# Patient Record
Sex: FEMALE | Race: WHITE | NOT HISPANIC OR LATINO | Employment: PART TIME | URBAN - METROPOLITAN AREA
[De-identification: names, ages, dates, MRNs, and addresses within clinical notes are randomized per-mention and may not be internally consistent; named-entity substitution may affect disease eponyms.]

---

## 2017-05-12 ENCOUNTER — APPOINTMENT (OUTPATIENT)
Dept: GENERAL RADIOLOGY | Facility: HOSPITAL | Age: 42
End: 2017-05-12

## 2017-05-12 ENCOUNTER — HOSPITAL ENCOUNTER (INPATIENT)
Facility: HOSPITAL | Age: 42
LOS: 2 days | Discharge: HOME | End: 2017-05-14
Attending: EMERGENCY MEDICINE | Admitting: ORTHOPAEDIC SURGERY

## 2017-05-12 ENCOUNTER — APPOINTMENT (OUTPATIENT)
Dept: CT IMAGING | Facility: HOSPITAL | Age: 42
End: 2017-05-12

## 2017-05-12 ENCOUNTER — PREP FOR SURGERY (OUTPATIENT)
Dept: ORTHOPEDIC SURGERY | Facility: CLINIC | Age: 42
End: 2017-05-12

## 2017-05-12 DIAGNOSIS — L82.0 INFLAMED SEBORRHEIC KERATOSIS: ICD-10-CM

## 2017-05-12 DIAGNOSIS — S82.841A BIMALLEOLAR ANKLE FRACTURE, RIGHT, CLOSED, INITIAL ENCOUNTER: ICD-10-CM

## 2017-05-12 DIAGNOSIS — J01.00 ACUTE NON-RECURRENT MAXILLARY SINUSITIS: ICD-10-CM

## 2017-05-12 DIAGNOSIS — R63.5 ABNORMAL WEIGHT GAIN: ICD-10-CM

## 2017-05-12 DIAGNOSIS — F41.1 ANXIETY, GENERALIZED: ICD-10-CM

## 2017-05-12 DIAGNOSIS — S82.891A ANKLE FRACTURE, RIGHT, CLOSED, INITIAL ENCOUNTER: Primary | ICD-10-CM

## 2017-05-12 DIAGNOSIS — S82.841A BIMALLEOLAR ANKLE FRACTURE, RIGHT, CLOSED, INITIAL ENCOUNTER: Primary | ICD-10-CM

## 2017-05-12 PROBLEM — S82.899A ANKLE FRACTURE: Status: ACTIVE | Noted: 2017-05-12

## 2017-05-12 LAB
ALBUMIN SERPL-MCNC: 4.3 G/DL (ref 3.5–5.2)
ALBUMIN/GLOB SERPL: 1.6 G/DL
ALP SERPL-CCNC: 103 U/L (ref 40–129)
ALT SERPL W P-5'-P-CCNC: 13 U/L (ref 5–33)
ANION GAP SERPL CALCULATED.3IONS-SCNC: 14.7 MMOL/L
AST SERPL-CCNC: 18 U/L (ref 5–32)
BASOPHILS # BLD AUTO: 0.03 10*3/MM3 (ref 0–0.2)
BASOPHILS NFR BLD AUTO: 0.4 % (ref 0–2)
BILIRUB SERPL-MCNC: 0.2 MG/DL (ref 0.2–1.2)
BUN BLD-MCNC: 9 MG/DL (ref 6–20)
BUN/CREAT SERPL: 13.2 (ref 7–25)
CALCIUM SPEC-SCNC: 8.5 MG/DL (ref 8.6–10.5)
CHLORIDE SERPL-SCNC: 102 MMOL/L (ref 98–107)
CO2 SERPL-SCNC: 26.3 MMOL/L (ref 22–29)
CREAT BLD-MCNC: 0.68 MG/DL (ref 0.57–1)
DEPRECATED RDW RBC AUTO: 40.4 FL (ref 37–54)
EOSINOPHIL # BLD AUTO: 0.08 10*3/MM3 (ref 0.1–0.3)
EOSINOPHIL NFR BLD AUTO: 1.1 % (ref 0–4)
ERYTHROCYTE [DISTWIDTH] IN BLOOD BY AUTOMATED COUNT: 13.1 % (ref 11.5–14.5)
GFR SERPL CREATININE-BSD FRML MDRD: 94 ML/MIN/1.73
GLOBULIN UR ELPH-MCNC: 2.7 GM/DL
GLUCOSE BLD-MCNC: 107 MG/DL (ref 65–99)
HCG SERPL QL: NEGATIVE
HCT VFR BLD AUTO: 39.2 % (ref 37–47)
HGB BLD-MCNC: 13.4 G/DL (ref 12–16)
IMM GRANULOCYTES # BLD: 0.02 10*3/MM3 (ref 0–0.03)
IMM GRANULOCYTES NFR BLD: 0.3 % (ref 0–0.5)
LYMPHOCYTES # BLD AUTO: 1.45 10*3/MM3 (ref 0.6–4.8)
LYMPHOCYTES NFR BLD AUTO: 19.7 % (ref 20–45)
MCH RBC QN AUTO: 29 PG (ref 27–31)
MCHC RBC AUTO-ENTMCNC: 34.2 G/DL (ref 31–37)
MCV RBC AUTO: 84.8 FL (ref 81–99)
MONOCYTES # BLD AUTO: 0.35 10*3/MM3 (ref 0–1)
MONOCYTES NFR BLD AUTO: 4.7 % (ref 3–8)
NEUTROPHILS # BLD AUTO: 5.44 10*3/MM3 (ref 1.5–8.3)
NEUTROPHILS NFR BLD AUTO: 73.8 % (ref 45–70)
NRBC BLD MANUAL-RTO: 0 /100 WBC (ref 0–0)
PLATELET # BLD AUTO: 296 10*3/MM3 (ref 140–500)
PMV BLD AUTO: 9.4 FL (ref 7.4–10.4)
POTASSIUM BLD-SCNC: 3.9 MMOL/L (ref 3.5–5.2)
PROT SERPL-MCNC: 7 G/DL (ref 6–8.5)
RBC # BLD AUTO: 4.62 10*6/MM3 (ref 4.2–5.4)
SODIUM BLD-SCNC: 143 MMOL/L (ref 136–145)
WBC NRBC COR # BLD: 7.37 10*3/MM3 (ref 4.8–10.8)

## 2017-05-12 PROCEDURE — 93010 ELECTROCARDIOGRAM REPORT: CPT | Performed by: INTERNAL MEDICINE

## 2017-05-12 PROCEDURE — 71010 HC CHEST PA OR AP: CPT

## 2017-05-12 PROCEDURE — 85025 COMPLETE CBC W/AUTO DIFF WBC: CPT | Performed by: EMERGENCY MEDICINE

## 2017-05-12 PROCEDURE — S0260 H&P FOR SURGERY: HCPCS | Performed by: ORTHOPAEDIC SURGERY

## 2017-05-12 PROCEDURE — 25010000002 HYDROMORPHONE PER 4 MG: Performed by: EMERGENCY MEDICINE

## 2017-05-12 PROCEDURE — 99284 EMERGENCY DEPT VISIT MOD MDM: CPT

## 2017-05-12 PROCEDURE — 84703 CHORIONIC GONADOTROPIN ASSAY: CPT | Performed by: EMERGENCY MEDICINE

## 2017-05-12 PROCEDURE — 93005 ELECTROCARDIOGRAM TRACING: CPT | Performed by: ORTHOPAEDIC SURGERY

## 2017-05-12 PROCEDURE — 73610 X-RAY EXAM OF ANKLE: CPT

## 2017-05-12 PROCEDURE — 73700 CT LOWER EXTREMITY W/O DYE: CPT

## 2017-05-12 PROCEDURE — 25010000002 ONDANSETRON PER 1 MG: Performed by: EMERGENCY MEDICINE

## 2017-05-12 PROCEDURE — 99284 EMERGENCY DEPT VISIT MOD MDM: CPT | Performed by: EMERGENCY MEDICINE

## 2017-05-12 PROCEDURE — 80053 COMPREHEN METABOLIC PANEL: CPT | Performed by: EMERGENCY MEDICINE

## 2017-05-12 RX ORDER — MELOXICAM 7.5 MG/1
15 TABLET ORAL ONCE
Status: CANCELLED | OUTPATIENT
Start: 2017-05-13

## 2017-05-12 RX ORDER — SODIUM CHLORIDE 0.9 % (FLUSH) 0.9 %
1-10 SYRINGE (ML) INJECTION AS NEEDED
Status: DISCONTINUED | OUTPATIENT
Start: 2017-05-12 | End: 2017-05-13

## 2017-05-12 RX ORDER — ACETAMINOPHEN 10 MG/ML
1000 INJECTION, SOLUTION INTRAVENOUS ONCE
Status: CANCELLED | OUTPATIENT
Start: 2017-05-13

## 2017-05-12 RX ORDER — ONDANSETRON 2 MG/ML
4 INJECTION INTRAMUSCULAR; INTRAVENOUS ONCE
Status: COMPLETED | OUTPATIENT
Start: 2017-05-12 | End: 2017-05-12

## 2017-05-12 RX ORDER — OXYCODONE HCL 10 MG/1
10 TABLET, FILM COATED, EXTENDED RELEASE ORAL ONCE
Status: CANCELLED | OUTPATIENT
Start: 2017-05-12 | End: 2017-05-12

## 2017-05-12 RX ORDER — SODIUM CHLORIDE 9 MG/ML
100 INJECTION, SOLUTION INTRAVENOUS CONTINUOUS
Status: DISCONTINUED | OUTPATIENT
Start: 2017-05-12 | End: 2017-05-13

## 2017-05-12 RX ORDER — HYDROCODONE BITARTRATE AND ACETAMINOPHEN 5; 325 MG/1; MG/1
1 TABLET ORAL ONCE
Status: COMPLETED | OUTPATIENT
Start: 2017-05-12 | End: 2017-05-12

## 2017-05-12 RX ORDER — NALOXONE HCL 0.4 MG/ML
0.4 VIAL (ML) INJECTION
Status: DISCONTINUED | OUTPATIENT
Start: 2017-05-12 | End: 2017-05-14 | Stop reason: HOSPADM

## 2017-05-12 RX ORDER — PREGABALIN 150 MG/1
150 CAPSULE ORAL ONCE
Status: CANCELLED | OUTPATIENT
Start: 2017-05-13

## 2017-05-12 RX ORDER — ONDANSETRON 2 MG/ML
4 INJECTION INTRAMUSCULAR; INTRAVENOUS EVERY 6 HOURS PRN
Status: DISCONTINUED | OUTPATIENT
Start: 2017-05-12 | End: 2017-05-14 | Stop reason: HOSPADM

## 2017-05-12 RX ADMIN — HYDROCODONE BITARTRATE AND ACETAMINOPHEN 1 TABLET: 5; 325 TABLET ORAL at 21:53

## 2017-05-12 RX ADMIN — HYDROMORPHONE HYDROCHLORIDE 1 MG: 1 INJECTION, SOLUTION INTRAMUSCULAR; INTRAVENOUS; SUBCUTANEOUS at 22:30

## 2017-05-12 RX ADMIN — ONDANSETRON 4 MG: 2 INJECTION, SOLUTION INTRAMUSCULAR; INTRAVENOUS at 22:28

## 2017-05-12 RX ADMIN — SODIUM CHLORIDE 1000 ML: 9 INJECTION, SOLUTION INTRAVENOUS at 22:39

## 2017-05-13 ENCOUNTER — ANESTHESIA EVENT (OUTPATIENT)
Dept: PERIOP | Facility: HOSPITAL | Age: 42
End: 2017-05-13

## 2017-05-13 ENCOUNTER — ANESTHESIA (OUTPATIENT)
Dept: PERIOP | Facility: HOSPITAL | Age: 42
End: 2017-05-13

## 2017-05-13 ENCOUNTER — APPOINTMENT (OUTPATIENT)
Dept: GENERAL RADIOLOGY | Facility: HOSPITAL | Age: 42
End: 2017-05-13

## 2017-05-13 PROBLEM — S82.843A BIMALLEOLAR ANKLE FRACTURE: Status: ACTIVE | Noted: 2017-05-13

## 2017-05-13 PROCEDURE — C1713 ANCHOR/SCREW BN/BN,TIS/BN: HCPCS | Performed by: ORTHOPAEDIC SURGERY

## 2017-05-13 PROCEDURE — 25010000002 PROMETHAZINE PER 50 MG: Performed by: ORTHOPAEDIC SURGERY

## 2017-05-13 PROCEDURE — 25010000003 CEFAZOLIN PER 500 MG: Performed by: ORTHOPAEDIC SURGERY

## 2017-05-13 PROCEDURE — 25010000002 MIDAZOLAM PER 1 MG

## 2017-05-13 PROCEDURE — 25010000002 MIDAZOLAM PER 1 MG: Performed by: NURSE ANESTHETIST, CERTIFIED REGISTERED

## 2017-05-13 PROCEDURE — 73600 X-RAY EXAM OF ANKLE: CPT

## 2017-05-13 PROCEDURE — 25010000002 HYDROMORPHONE PER 4 MG: Performed by: ORTHOPAEDIC SURGERY

## 2017-05-13 PROCEDURE — 27814 TREATMENT OF ANKLE FRACTURE: CPT | Performed by: ORTHOPAEDIC SURGERY

## 2017-05-13 PROCEDURE — 25010000002 DEXAMETHASONE PER 1 MG: Performed by: NURSE ANESTHETIST, CERTIFIED REGISTERED

## 2017-05-13 PROCEDURE — 25010000002 ONDANSETRON PER 1 MG: Performed by: NURSE ANESTHETIST, CERTIFIED REGISTERED

## 2017-05-13 PROCEDURE — 25010000002 ROPIVACAINE PER 1 MG: Performed by: NURSE ANESTHETIST, CERTIFIED REGISTERED

## 2017-05-13 PROCEDURE — 25010000002 PROMETHAZINE PER 50 MG

## 2017-05-13 PROCEDURE — 27814 TREATMENT OF ANKLE FRACTURE: CPT | Performed by: SPECIALIST/TECHNOLOGIST, OTHER

## 2017-05-13 PROCEDURE — 25010000002 ONDANSETRON PER 1 MG: Performed by: ORTHOPAEDIC SURGERY

## 2017-05-13 PROCEDURE — 94799 UNLISTED PULMONARY SVC/PX: CPT

## 2017-05-13 PROCEDURE — 0QSG04Z REPOSITION RIGHT TIBIA WITH INTERNAL FIXATION DEVICE, OPEN APPROACH: ICD-10-PCS | Performed by: ORTHOPAEDIC SURGERY

## 2017-05-13 PROCEDURE — 0QSJ04Z REPOSITION RIGHT FIBULA WITH INTERNAL FIXATION DEVICE, OPEN APPROACH: ICD-10-PCS | Performed by: ORTHOPAEDIC SURGERY

## 2017-05-13 PROCEDURE — 25010000002 PROPOFOL 10 MG/ML EMULSION: Performed by: NURSE ANESTHETIST, CERTIFIED REGISTERED

## 2017-05-13 PROCEDURE — 76000 FLUOROSCOPY <1 HR PHYS/QHP: CPT

## 2017-05-13 DEVICE — BONE SCREW, T10
Type: IMPLANTABLE DEVICE | Site: ANKLE | Status: FUNCTIONAL
Brand: VARIAX

## 2017-05-13 DEVICE — BONE SCREW, T7
Type: IMPLANTABLE DEVICE | Site: ANKLE | Status: FUNCTIONAL
Brand: VARIAX

## 2017-05-13 DEVICE — CANNULATED SCREW
Type: IMPLANTABLE DEVICE | Site: ANKLE | Status: FUNCTIONAL
Brand: ASNIS

## 2017-05-13 DEVICE — LOCKING SCREW, T10
Type: IMPLANTABLE DEVICE | Site: ANKLE | Status: FUNCTIONAL
Brand: VARIAX

## 2017-05-13 DEVICE — DISTAL LATERAL FIBULA PLATE, 4 HOLE
Type: IMPLANTABLE DEVICE | Site: ANKLE | Status: FUNCTIONAL
Brand: VARIAX

## 2017-05-13 RX ORDER — DEXAMETHASONE SODIUM PHOSPHATE 4 MG/ML
INJECTION, SOLUTION INTRA-ARTICULAR; INTRALESIONAL; INTRAMUSCULAR; INTRAVENOUS; SOFT TISSUE AS NEEDED
Status: DISCONTINUED | OUTPATIENT
Start: 2017-05-13 | End: 2017-05-13 | Stop reason: SURG

## 2017-05-13 RX ORDER — MELOXICAM 7.5 MG/1
15 TABLET ORAL ONCE
Status: COMPLETED | OUTPATIENT
Start: 2017-05-13 | End: 2017-05-13

## 2017-05-13 RX ORDER — PREGABALIN 75 MG/1
75 CAPSULE ORAL EVERY 12 HOURS SCHEDULED
Status: DISCONTINUED | OUTPATIENT
Start: 2017-05-13 | End: 2017-05-14 | Stop reason: HOSPADM

## 2017-05-13 RX ORDER — BUPIVACAINE HYDROCHLORIDE AND EPINEPHRINE 5; 5 MG/ML; UG/ML
INJECTION, SOLUTION EPIDURAL; INTRACAUDAL; PERINEURAL AS NEEDED
Status: DISCONTINUED | OUTPATIENT
Start: 2017-05-13 | End: 2017-05-13 | Stop reason: SURG

## 2017-05-13 RX ORDER — OXYCODONE HCL 10 MG/1
10 TABLET, FILM COATED, EXTENDED RELEASE ORAL ONCE
Status: COMPLETED | OUTPATIENT
Start: 2017-05-13 | End: 2017-05-13

## 2017-05-13 RX ORDER — CETIRIZINE HYDROCHLORIDE 10 MG/1
10 TABLET ORAL DAILY
Status: DISCONTINUED | OUTPATIENT
Start: 2017-05-13 | End: 2017-05-14 | Stop reason: HOSPADM

## 2017-05-13 RX ORDER — CEFAZOLIN SODIUM 1 G/3ML
INJECTION, POWDER, FOR SOLUTION INTRAMUSCULAR; INTRAVENOUS
Status: DISPENSED
Start: 2017-05-13 | End: 2017-05-13

## 2017-05-13 RX ORDER — MELOXICAM 7.5 MG/1
7.5 TABLET ORAL DAILY
Status: DISCONTINUED | OUTPATIENT
Start: 2017-05-13 | End: 2017-05-14 | Stop reason: HOSPADM

## 2017-05-13 RX ORDER — SODIUM CHLORIDE 9 MG/ML
75 INJECTION, SOLUTION INTRAVENOUS CONTINUOUS
Status: DISCONTINUED | OUTPATIENT
Start: 2017-05-13 | End: 2017-05-14 | Stop reason: HOSPADM

## 2017-05-13 RX ORDER — SODIUM CHLORIDE 0.9 % (FLUSH) 0.9 %
1-10 SYRINGE (ML) INJECTION AS NEEDED
Status: DISCONTINUED | OUTPATIENT
Start: 2017-05-13 | End: 2017-05-13 | Stop reason: HOSPADM

## 2017-05-13 RX ORDER — MIDAZOLAM HYDROCHLORIDE 1 MG/ML
2 INJECTION INTRAMUSCULAR; INTRAVENOUS
Status: DISCONTINUED | OUTPATIENT
Start: 2017-05-13 | End: 2017-05-13 | Stop reason: HOSPADM

## 2017-05-13 RX ORDER — PROMETHAZINE HYDROCHLORIDE 25 MG/ML
INJECTION, SOLUTION INTRAMUSCULAR; INTRAVENOUS
Status: COMPLETED
Start: 2017-05-13 | End: 2017-05-13

## 2017-05-13 RX ORDER — OXYCODONE HYDROCHLORIDE 5 MG/1
10 TABLET ORAL EVERY 4 HOURS PRN
Status: DISCONTINUED | OUTPATIENT
Start: 2017-05-13 | End: 2017-05-14 | Stop reason: HOSPADM

## 2017-05-13 RX ORDER — FAMOTIDINE 10 MG/ML
20 INJECTION, SOLUTION INTRAVENOUS
Status: DISCONTINUED | OUTPATIENT
Start: 2017-05-13 | End: 2017-05-13 | Stop reason: HOSPADM

## 2017-05-13 RX ORDER — MIDAZOLAM HYDROCHLORIDE 1 MG/ML
1 INJECTION INTRAMUSCULAR; INTRAVENOUS
Status: DISCONTINUED | OUTPATIENT
Start: 2017-05-13 | End: 2017-05-13 | Stop reason: HOSPADM

## 2017-05-13 RX ORDER — MIDAZOLAM HYDROCHLORIDE 1 MG/ML
INJECTION INTRAMUSCULAR; INTRAVENOUS
Status: COMPLETED
Start: 2017-05-13 | End: 2017-05-13

## 2017-05-13 RX ORDER — PREGABALIN 75 MG/1
150 CAPSULE ORAL ONCE
Status: COMPLETED | OUTPATIENT
Start: 2017-05-13 | End: 2017-05-13

## 2017-05-13 RX ORDER — BUPIVACAINE HYDROCHLORIDE 7.5 MG/ML
INJECTION, SOLUTION INTRASPINAL AS NEEDED
Status: DISCONTINUED | OUTPATIENT
Start: 2017-05-13 | End: 2017-05-13 | Stop reason: SURG

## 2017-05-13 RX ORDER — MEPERIDINE HYDROCHLORIDE 25 MG/ML
12.5 INJECTION INTRAMUSCULAR; INTRAVENOUS; SUBCUTANEOUS
Status: DISCONTINUED | OUTPATIENT
Start: 2017-05-13 | End: 2017-05-13 | Stop reason: HOSPADM

## 2017-05-13 RX ORDER — ROPIVACAINE HYDROCHLORIDE 5 MG/ML
INJECTION, SOLUTION EPIDURAL; INFILTRATION; PERINEURAL AS NEEDED
Status: DISCONTINUED | OUTPATIENT
Start: 2017-05-13 | End: 2017-05-13 | Stop reason: SURG

## 2017-05-13 RX ORDER — MAGNESIUM HYDROXIDE 1200 MG/15ML
LIQUID ORAL AS NEEDED
Status: DISCONTINUED | OUTPATIENT
Start: 2017-05-13 | End: 2017-05-13 | Stop reason: HOSPADM

## 2017-05-13 RX ORDER — FAMOTIDINE 20 MG/1
20 TABLET, FILM COATED ORAL
Status: DISCONTINUED | OUTPATIENT
Start: 2017-05-13 | End: 2017-05-13 | Stop reason: HOSPADM

## 2017-05-13 RX ORDER — PROPOFOL 10 MG/ML
VIAL (ML) INTRAVENOUS CONTINUOUS PRN
Status: DISCONTINUED | OUTPATIENT
Start: 2017-05-13 | End: 2017-05-13 | Stop reason: SURG

## 2017-05-13 RX ORDER — ONDANSETRON 2 MG/ML
4 INJECTION INTRAMUSCULAR; INTRAVENOUS ONCE AS NEEDED
Status: DISCONTINUED | OUTPATIENT
Start: 2017-05-13 | End: 2017-05-13 | Stop reason: HOSPADM

## 2017-05-13 RX ORDER — VENLAFAXINE HYDROCHLORIDE 37.5 MG/1
75 CAPSULE, EXTENDED RELEASE ORAL DAILY
Status: DISCONTINUED | OUTPATIENT
Start: 2017-05-13 | End: 2017-05-14 | Stop reason: HOSPADM

## 2017-05-13 RX ORDER — ASPIRIN 325 MG
325 TABLET ORAL EVERY 12 HOURS
Status: DISCONTINUED | OUTPATIENT
Start: 2017-05-13 | End: 2017-05-14 | Stop reason: HOSPADM

## 2017-05-13 RX ORDER — FAMOTIDINE 10 MG/ML
INJECTION, SOLUTION INTRAVENOUS
Status: COMPLETED
Start: 2017-05-13 | End: 2017-05-13

## 2017-05-13 RX ORDER — ONDANSETRON 2 MG/ML
4 INJECTION INTRAMUSCULAR; INTRAVENOUS ONCE AS NEEDED
Status: COMPLETED | OUTPATIENT
Start: 2017-05-13 | End: 2017-05-13

## 2017-05-13 RX ORDER — SODIUM CHLORIDE, SODIUM LACTATE, POTASSIUM CHLORIDE, CALCIUM CHLORIDE 600; 310; 30; 20 MG/100ML; MG/100ML; MG/100ML; MG/100ML
INJECTION, SOLUTION INTRAVENOUS CONTINUOUS PRN
Status: DISCONTINUED | OUTPATIENT
Start: 2017-05-13 | End: 2017-05-13 | Stop reason: SURG

## 2017-05-13 RX ORDER — ACETAMINOPHEN 10 MG/ML
1000 INJECTION, SOLUTION INTRAVENOUS ONCE
Status: COMPLETED | OUTPATIENT
Start: 2017-05-13 | End: 2017-05-13

## 2017-05-13 RX ORDER — SODIUM CHLORIDE 9 MG/ML
INJECTION, SOLUTION INTRAVENOUS AS NEEDED
Status: DISCONTINUED | OUTPATIENT
Start: 2017-05-13 | End: 2017-05-13 | Stop reason: HOSPADM

## 2017-05-13 RX ADMIN — ONDANSETRON 4 MG: 2 INJECTION, SOLUTION INTRAMUSCULAR; INTRAVENOUS at 09:33

## 2017-05-13 RX ADMIN — OXYCODONE HYDROCHLORIDE 10 MG: 10 TABLET, FILM COATED, EXTENDED RELEASE ORAL at 09:57

## 2017-05-13 RX ADMIN — FAMOTIDINE 20 MG: 10 INJECTION, SOLUTION INTRAVENOUS at 09:33

## 2017-05-13 RX ADMIN — BUPIVACAINE HYDROCHLORIDE AND EPINEPHRINE 30 ML: 5; 5 INJECTION, SOLUTION EPIDURAL; INTRACAUDAL; PERINEURAL at 09:30

## 2017-05-13 RX ADMIN — OXYCODONE HYDROCHLORIDE 10 MG: 5 TABLET ORAL at 21:31

## 2017-05-13 RX ADMIN — HYDROMORPHONE HYDROCHLORIDE 1 MG: 1 INJECTION, SOLUTION INTRAMUSCULAR; INTRAVENOUS; SUBCUTANEOUS at 01:30

## 2017-05-13 RX ADMIN — HYDROMORPHONE HYDROCHLORIDE 1 MG: 1 INJECTION, SOLUTION INTRAMUSCULAR; INTRAVENOUS; SUBCUTANEOUS at 08:18

## 2017-05-13 RX ADMIN — SODIUM CHLORIDE, PRESERVATIVE FREE 12.5 MG: 5 INJECTION INTRAVENOUS at 04:12

## 2017-05-13 RX ADMIN — DEXAMETHASONE SODIUM PHOSPHATE 8 MG: 4 INJECTION, SOLUTION INTRAMUSCULAR; INTRAVENOUS at 09:40

## 2017-05-13 RX ADMIN — ASPIRIN 325 MG: 325 TABLET ORAL at 13:33

## 2017-05-13 RX ADMIN — ONDANSETRON 4 MG: 2 INJECTION, SOLUTION INTRAMUSCULAR; INTRAVENOUS at 21:31

## 2017-05-13 RX ADMIN — ACETAMINOPHEN 1000 MG: 10 INJECTION, SOLUTION INTRAVENOUS at 09:59

## 2017-05-13 RX ADMIN — SODIUM CHLORIDE 75 ML/HR: 9 INJECTION, SOLUTION INTRAVENOUS at 13:15

## 2017-05-13 RX ADMIN — MELOXICAM 15 MG: 7.5 TABLET ORAL at 09:57

## 2017-05-13 RX ADMIN — PREGABALIN 150 MG: 75 CAPSULE ORAL at 09:57

## 2017-05-13 RX ADMIN — PREGABALIN 75 MG: 75 CAPSULE ORAL at 21:31

## 2017-05-13 RX ADMIN — CEFAZOLIN 2 G: 1 INJECTION, POWDER, FOR SOLUTION INTRAMUSCULAR; INTRAVENOUS at 18:16

## 2017-05-13 RX ADMIN — ONDANSETRON 4 MG: 2 INJECTION, SOLUTION INTRAMUSCULAR; INTRAVENOUS at 03:58

## 2017-05-13 RX ADMIN — MELOXICAM 7.5 MG: 7.5 TABLET ORAL at 13:33

## 2017-05-13 RX ADMIN — PROMETHAZINE HYDROCHLORIDE 12.5 MG: 25 INJECTION INTRAMUSCULAR; INTRAVENOUS at 04:15

## 2017-05-13 RX ADMIN — SODIUM CHLORIDE, POTASSIUM CHLORIDE, SODIUM LACTATE AND CALCIUM CHLORIDE: 600; 310; 30; 20 INJECTION, SOLUTION INTRAVENOUS at 09:21

## 2017-05-13 RX ADMIN — SODIUM CHLORIDE, POTASSIUM CHLORIDE, SODIUM LACTATE AND CALCIUM CHLORIDE: 600; 310; 30; 20 INJECTION, SOLUTION INTRAVENOUS at 11:36

## 2017-05-13 RX ADMIN — PROPOFOL 50 MCG/KG/MIN: 10 INJECTION, EMULSION INTRAVENOUS at 10:20

## 2017-05-13 RX ADMIN — BUPIVACAINE HYDROCHLORIDE IN DEXTROSE 12 MG: 7.5 INJECTION, SOLUTION SUBARACHNOID at 10:04

## 2017-05-13 RX ADMIN — CEFAZOLIN SODIUM 2 G: 2 SOLUTION INTRAVENOUS at 10:15

## 2017-05-13 RX ADMIN — MIDAZOLAM HYDROCHLORIDE 1 MG: 1 INJECTION, SOLUTION INTRAMUSCULAR; INTRAVENOUS at 09:34

## 2017-05-13 RX ADMIN — MIDAZOLAM HYDROCHLORIDE 1 MG: 1 INJECTION, SOLUTION INTRAMUSCULAR; INTRAVENOUS at 09:38

## 2017-05-13 RX ADMIN — MIDAZOLAM HYDROCHLORIDE 2 MG: 1 INJECTION, SOLUTION INTRAMUSCULAR; INTRAVENOUS at 10:21

## 2017-05-13 RX ADMIN — SODIUM CHLORIDE 50 ML/HR: 9 INJECTION, SOLUTION INTRAVENOUS at 00:22

## 2017-05-13 RX ADMIN — SODIUM CHLORIDE 250 ML: 9 INJECTION, SOLUTION INTRAVENOUS at 04:12

## 2017-05-13 RX ADMIN — ROPIVACAINE HYDROCHLORIDE 30 ML: 5 INJECTION, SOLUTION EPIDURAL; INFILTRATION; PERINEURAL at 09:40

## 2017-05-14 VITALS
RESPIRATION RATE: 16 BRPM | SYSTOLIC BLOOD PRESSURE: 94 MMHG | DIASTOLIC BLOOD PRESSURE: 55 MMHG | OXYGEN SATURATION: 96 % | HEIGHT: 65 IN | WEIGHT: 156.2 LBS | HEART RATE: 68 BPM | TEMPERATURE: 98.4 F | BODY MASS INDEX: 26.02 KG/M2

## 2017-05-14 PROCEDURE — 99024 POSTOP FOLLOW-UP VISIT: CPT | Performed by: ORTHOPAEDIC SURGERY

## 2017-05-14 PROCEDURE — 25010000002 ONDANSETRON PER 1 MG: Performed by: ORTHOPAEDIC SURGERY

## 2017-05-14 PROCEDURE — 97161 PT EVAL LOW COMPLEX 20 MIN: CPT

## 2017-05-14 PROCEDURE — 25010000003 CEFAZOLIN PER 500 MG: Performed by: ORTHOPAEDIC SURGERY

## 2017-05-14 RX ORDER — ONDANSETRON 4 MG/1
4 TABLET, ORALLY DISINTEGRATING ORAL EVERY 8 HOURS PRN
Qty: 50 TABLET | Refills: 1 | Status: SHIPPED | OUTPATIENT
Start: 2017-05-14 | End: 2017-06-28

## 2017-05-14 RX ORDER — ASPIRIN 325 MG
325 TABLET ORAL EVERY 12 HOURS
Qty: 60 TABLET | Refills: 0 | Status: SHIPPED | OUTPATIENT
Start: 2017-05-14 | End: 2017-07-20

## 2017-05-14 RX ORDER — GABAPENTIN 300 MG/1
300 CAPSULE ORAL 3 TIMES DAILY
Qty: 60 CAPSULE | Refills: 1 | Status: SHIPPED | OUTPATIENT
Start: 2017-05-14 | End: 2017-07-20

## 2017-05-14 RX ORDER — OXYCODONE AND ACETAMINOPHEN 7.5; 325 MG/1; MG/1
1 TABLET ORAL EVERY 6 HOURS PRN
Qty: 50 TABLET | Refills: 0 | Status: SHIPPED | OUTPATIENT
Start: 2017-05-14 | End: 2017-05-22 | Stop reason: SDUPTHER

## 2017-05-14 RX ADMIN — OXYCODONE HYDROCHLORIDE 10 MG: 5 TABLET ORAL at 08:56

## 2017-05-14 RX ADMIN — PREGABALIN 75 MG: 75 CAPSULE ORAL at 08:56

## 2017-05-14 RX ADMIN — ONDANSETRON 4 MG: 2 INJECTION, SOLUTION INTRAMUSCULAR; INTRAVENOUS at 08:56

## 2017-05-14 RX ADMIN — CEFAZOLIN 2 G: 1 INJECTION, POWDER, FOR SOLUTION INTRAMUSCULAR; INTRAVENOUS at 02:39

## 2017-05-14 RX ADMIN — CEFAZOLIN 2 G: 1 INJECTION, POWDER, FOR SOLUTION INTRAMUSCULAR; INTRAVENOUS at 10:31

## 2017-05-14 RX ADMIN — ASPIRIN 325 MG: 325 TABLET ORAL at 02:02

## 2017-05-14 RX ADMIN — OXYCODONE HYDROCHLORIDE 10 MG: 5 TABLET ORAL at 13:04

## 2017-05-14 RX ADMIN — MELOXICAM 7.5 MG: 7.5 TABLET ORAL at 09:13

## 2017-05-22 ENCOUNTER — OFFICE VISIT (OUTPATIENT)
Dept: ORTHOPEDIC SURGERY | Facility: CLINIC | Age: 42
End: 2017-05-22

## 2017-05-22 VITALS
SYSTOLIC BLOOD PRESSURE: 126 MMHG | DIASTOLIC BLOOD PRESSURE: 71 MMHG | HEIGHT: 65 IN | BODY MASS INDEX: 25.99 KG/M2 | HEART RATE: 80 BPM | WEIGHT: 156 LBS

## 2017-05-22 DIAGNOSIS — S82.841A BIMALLEOLAR ANKLE FRACTURE, RIGHT, CLOSED, INITIAL ENCOUNTER: Primary | ICD-10-CM

## 2017-05-22 DIAGNOSIS — Z09 STATUS POST ORTHOPEDIC SURGERY, FOLLOW-UP EXAM: ICD-10-CM

## 2017-05-22 PROCEDURE — 99024 POSTOP FOLLOW-UP VISIT: CPT | Performed by: ORTHOPAEDIC SURGERY

## 2017-05-22 RX ORDER — OXYCODONE AND ACETAMINOPHEN 7.5; 325 MG/1; MG/1
1 TABLET ORAL EVERY 6 HOURS PRN
Qty: 50 TABLET | Refills: 0 | Status: SHIPPED | OUTPATIENT
Start: 2017-05-22 | End: 2017-06-09 | Stop reason: SDUPTHER

## 2017-06-07 ENCOUNTER — OFFICE VISIT (OUTPATIENT)
Dept: ORTHOPEDIC SURGERY | Facility: CLINIC | Age: 42
End: 2017-06-07

## 2017-06-07 DIAGNOSIS — Z09 STATUS POST ORTHOPEDIC SURGERY, FOLLOW-UP EXAM: ICD-10-CM

## 2017-06-07 DIAGNOSIS — S82.841A BIMALLEOLAR ANKLE FRACTURE, RIGHT, CLOSED, INITIAL ENCOUNTER: Primary | ICD-10-CM

## 2017-06-07 PROCEDURE — 73610 X-RAY EXAM OF ANKLE: CPT | Performed by: ORTHOPAEDIC SURGERY

## 2017-06-07 PROCEDURE — 99024 POSTOP FOLLOW-UP VISIT: CPT | Performed by: ORTHOPAEDIC SURGERY

## 2017-06-07 NOTE — PROGRESS NOTES
Subjective: Right ankle fracture     Patient ID: Eila Payne is a 41 y.o. female.    Chief Complaint:    History of Present Illness patient is 3 Weeks status post ORIF of a right trimalleolar ankle fracture.  She is doing well.  Pain is slowly decreasing.  She still nonweightbearing as instructed.       Social History     Occupational History   • Not on file.     Social History Main Topics   • Smoking status: Never Smoker   • Smokeless tobacco: Not on file   • Alcohol use Yes      Comment: socially   • Drug use: No   • Sexual activity: Defer      Review of Systems   Constitutional: Negative for chills, diaphoresis, fever and unexpected weight change.   HENT: Negative for hearing loss, nosebleeds, sore throat and tinnitus.    Eyes: Negative for pain and visual disturbance.   Respiratory: Negative for cough, shortness of breath and wheezing.    Cardiovascular: Negative for chest pain and palpitations.   Gastrointestinal: Negative for abdominal pain, diarrhea, nausea and vomiting.   Endocrine: Negative for cold intolerance, heat intolerance and polydipsia.   Genitourinary: Negative for difficulty urinating, dysuria and hematuria.   Musculoskeletal: Negative for arthralgias, joint swelling and myalgias.   Skin: Negative for rash and wound.   Allergic/Immunologic: Negative for environmental allergies.   Neurological: Negative for dizziness, syncope and numbness.   Hematological: Does not bruise/bleed easily.   Psychiatric/Behavioral: Negative for dysphoric mood and sleep disturbance. The patient is not nervous/anxious.          Past Medical History:   Diagnosis Date   • Depression    • PONV (postoperative nausea and vomiting)      Past Surgical History:   Procedure Laterality Date   • ABDOMINAL SURGERY     • ANKLE OPEN REDUCTION INTERNAL FIXATION Right 5/13/2017    Procedure:  OPEN REDUCTION INTERNAL FIXATION right ankle fractures;  Surgeon: Mega Beck MD;  Location: [facility];  Service:    • ANKLE SURGERY       broken in 1994   • BREAST SURGERY     • CHOLECYSTECTOMY     • COSMETIC SURGERY     • DILATATION AND CURETTAGE     • FRACTURE SURGERY       No family history on file.      Objective:  There were no vitals filed for this visit.  There were no vitals filed for this visit.  There is no height or weight on file to calculate BMI.       Ortho Exam  AP lateral oblique view show anatomical reduction of the fracture.  Compared to previous x-rays no significant changes noted.  Exam shows healing fracture blisters about the ankle.  She can only dorsiflex about 5°.  This still some swelling to the ankle as expected.  His no erythema.  No motor deficit good distal pulses.  Calf is nontender.  Negative Homans sign.  No sensory loss.    Assessment:       1. Bimalleolar ankle fracture, right, closed, initial encounter    2. Status post orthopedic surgery, follow-up exam          Plan:      she is instructed she needs to wear the fracture boot although she is up and about otherwise she can leave it off for range of motion exercises.  Begin physical therapy.  Working on range of motion.  Return in 3 weeks with a repeat x-ray.  Was instructed when at home to elevate the ankle above the heart and ice      Work Status:    RIKKI query complete.    Orders:  Orders Placed This Encounter   Procedures   • XR Ankle 3+ View Right   • Ambulatory Referral to Physical Therapy Evaluate and treat       Medications:  No orders of the defined types were placed in this encounter.      Followup:  Return in about 3 weeks (around 6/28/2017).          Dragon transcription disclaimer     Much of this encounter note is an electronic transcription/translation of spoken language to printed text. The electronic translation of spoken language may permit erroneous, or at times, nonsensical words or phrases to be inadvertently transcribed. Although I have reviewed the note for such errors, some may still exist.

## 2017-06-09 ENCOUNTER — HOSPITAL ENCOUNTER (OUTPATIENT)
Dept: PHYSICAL THERAPY | Facility: HOSPITAL | Age: 42
Setting detail: THERAPIES SERIES
Discharge: HOME | End: 2017-06-09

## 2017-06-09 DIAGNOSIS — S82.841A BIMALLEOLAR ANKLE FRACTURE, RIGHT, CLOSED, INITIAL ENCOUNTER: Primary | ICD-10-CM

## 2017-06-09 PROCEDURE — 97161 PT EVAL LOW COMPLEX 20 MIN: CPT

## 2017-06-09 NOTE — THERAPY EVALUATION
Outpatient Physical Therapy Ortho Initial Evaluation   [facility] [place]     Patient Name: Elia Payne  : 1975  MRN: [id number]  Today's Date: 2017      Visit Date: 2017    Patient Active Problem List   Diagnosis   • Abnormal weight gain   • Anxiety, generalized   • Inflamed seborrheic keratosis   • Acute maxillary sinusitis   • Ankle fracture   • Bimalleolar ankle fracture        Past Medical History:   Diagnosis Date   • Depression    • PONV (postoperative nausea and vomiting)         Past Surgical History:   Procedure Laterality Date   • ABDOMINAL SURGERY     • ANKLE OPEN REDUCTION INTERNAL FIXATION Right 2017    Procedure:  OPEN REDUCTION INTERNAL FIXATION right ankle fractures;  Surgeon: Mega Beck MD;  Location: [facility];  Service:    • ANKLE SURGERY      broken in    • BREAST SURGERY     • CHOLECYSTECTOMY     • COSMETIC SURGERY     • DILATATION AND CURETTAGE     • FRACTURE SURGERY         Visit Dx:     ICD-10-CM ICD-9-CM   1. Bimalleolar ankle fracture, right, closed, initial encounter S82.841A 824.4             Patient History       17 1300          History    Chief Complaint Difficulty Walking;Difficulty with daily activities;Joint stiffness;Joint swelling;Muscle tenderness;Muscle weakness;Pain;Numbness;Tingling  -AS      Type of Pain Ankle pain;Foot pain   Right  -AS      Date Current Problem(s) Began 17  -AS      Brief Description of Current Complaint Patient states she was at a concert and tripped and fell. She states she fx her ankle and she had surgery the next day. Patient stayed in hospital 3 days and then was D/C home. Patient is wearing a walking boot and she is NWB and using bilateral axillary crutches at this time. Patient returns to her MD in 2 weeks.  -AS      Onset Date- PT 17  -AS      Patient/Caregiver Goals Relieve pain;Return to prior level of function;Improve mobility;Improve strength  -AS      Patient's Rating of General Health Very  good  -AS      Occupation/sports/leisure activities office work  -AS      How has patient tried to help current problem? rest, heat  -AS      Pain     Pain Location Ankle;Foot  -AS      Pain at Present 3  -AS      Pain at Best 2  -AS      Pain at Worst 10  -AS      Pain Frequency Constant/continuous  -AS      Pain Description Aching  -AS      What Performance Factors Make the Current Problem(s) WORSE? movement  -AS      What Performance Factors Make the Current Problem(s) BETTER? rest  -AS      Daily Activities    Primary Language English  -AS      Are you able to read Yes  -AS      Are you able to write Yes  -AS      How does patient learn best? Listening;Reading  -AS      Teaching needs identified Home Exercise Program;Management of Condition  -AS      Patient is concerned about/has problems with Climbing Stairs;Difficulty with self care (i.e. bathing, dressing, toileting:;Flexibility;Performing home management (household chores, shopping, care of dependents);Performing job responsibilities/community activities (work, school,;Performing sports, recreation, and play activities;Standing;Walking  -AS      Does patient have problems with the following? None  -AS      Barriers to learning None  -AS      Pt Participated in POC and Goals Yes  -AS      Safety    Are you being hurt, hit, or frightened by anyone at home or in your life? No  -AS      Are you being neglected by a caregiver No  -AS        User Key  (r) = Recorded By, (t) = Taken By, (c) = Cosigned By    Initials Name Provider Type    AS Alfredo Lopez, PT Physical Therapist                PT Ortho       06/09/17 1300    Precautions and Contraindications    Precautions/Limitations non-weight bearing status  -AS    Posture/Observations    Posture- WNL Posture is WNL  -AS    Observations Ecchymosis/bruising;Edema;Incision healing;Muscle atrophy  -AS    Sensation    Sensation WNL? WNL  -AS    Foot/Ankle Palpation    Distal Tib/Fib Joint Right:;Tender;Swollen   -AS    Lateral Malleolus Right:;Tender;Swollen  -AS    Medial Malleolus Right:;Tender;Swollen  -AS    Plantar Fascia Right:;Tender;Swollen  -AS    Soleus Right:;Tender;Swollen  -AS    Achilles' Tendon Right:;Tender;Swollen  -AS    Toes Right:;Tender;Swollen  -AS    Ankle Accessory Motions    Mid-tarsal dorsal/plantar glide Right:;Hypomobile  -AS    Toes Accessory Motion    MTP Hallux - flexion Right:;Hypomobile  -AS    MTP Hallux - extension Right:;Hypomobile  -AS    MTP 2nd  digit - extension Right:;Hypomobile  -AS    MTP 3rd digit - flexion Right:;Hypomobile  -AS    MTP 3rd digit - extension Right:;Hypomobile  -AS    MTP 4th digit - flexion Right:;Hypomobile  -AS    MTP 4th digit - extension Right:;Hypomobile  -AS    MTP 5th digit - flexion Right:;Hypomobile  -AS    MTP 5th digit - extension Right:;Hypomobile  -AS    IP great toe Right:;Hypomobile  -AS    PIP 2nd digit Right:;Hypomobile  -AS    PIP 3rd digit Right:;Hypomobile  -AS    PIP 4th digit Right:;Hypomobile  -AS    PIP 5th digit Right:;Hypomobile  -AS    Right Ankle    Dorsiflexion AROM Deficit -23  -AS    Plantarflexion AROM Deficit 7   starts at -30 degrees of PF  -AS    Inversion AROM Deficit 8  -AS    Eversion AROM Deficit 5  -AS    Right Hip    Hip Flexion Gross Movement (4+/5) good plus  -AS    Hip Extension Gross Movement (4/5) good  -AS    Hip ABduction Gross Movement (4/5) good  -AS    Left Knee    Knee Extension Gross Movement (4+/5) good plus  -AS    Knee Flexion Gross Movement (4+/5) good plus  -AS    Right Ankle/Foot    Ankle PF Gross Movement --   Not tested due to surgery and lack of ROM  -AS    Ankle Dorsiflexion Gross Movement --   Not tested due to surgery and lack of ROM  -AS    Subtalar Inversion Gross Movement --   Not tested due to surgery and lack of ROM  -AS    Subtalar Eversion Gross Movement --   Not tested due to surgery and lack of ROM  -AS    Lower Extremity Flexibility    Gastrocnemius Right:;Severely limited  -AS    Soleus  Right:;Severely limited  -AS    Ankle Girth    Figure 8- Right 20 inches  -AS    Gait Assessment/Treatment    Gait, Assistive Device axillary crutches  -AS    Gait, Gait Pattern Analysis swing-through gait  -AS    Gait, Maintain Weight Bearing Status able to maintain weight bearing status  -AS    Gait, Comment Patient ambulates with a walking boot on her right LE and she is NWB at this time. Patient ambulates with bilateral axillary crutches using a swing through gait pattern.  -AS      User Key  (r) = Recorded By, (t) = Taken By, (c) = Cosigned By    Initials Name Provider Type    AS Alfredo Lopez, PT Physical Therapist                            Therapy Education       06/09/17 1323          Therapy Education    Given HEP;Pain management;Edema management  -AS      Program New  -AS      How Provided Verbal;Demonstration;Written  -AS      Provided to Patient  -AS      Level of Understanding Teach back education performed;Verbalized;Demonstrated  -AS        User Key  (r) = Recorded By, (t) = Taken By, (c) = Cosigned By    Initials Name Provider Type    AS Alfredo Lopez, PT Physical Therapist                PT OP Goals       06/09/17 1300       PT Short Term Goals    STG Date to Achieve 06/30/17  -AS     STG 1 Patient to be compliant with her initial HEP for flexibility, ROM , and strengthening.  -AS     STG 2 Patient to improve her right ankle DF ROM to neutral.  -AS     STG 3 Patient to report pain on VAS of 4-5/10 when performing ADL's and work related activities.  -AS     STG 4 Patient to improve her right general ankle strength to 4-/5.  -AS     STG 5 Patient to improve her right ankle IV/EV to within 5 degrees of her contralateral ankle.  -AS     Long Term Goals    LTG Date to Achieve 07/21/17  -AS     LTG 1 Patient to be compliant with her advanced HEP for flexibility, ROM , and strengthening.  -AS     LTG 2 Patient to improve her right ankle DF ROM to 5-10 degrees.  -AS     LTG 3 Patient to  ambulate with normal heel to toe gait pattern community distances without walking boot and without crutches.  -AS     LTG 4 Patient to report pain on VAS of 1-2/10 when performing ADL's and work related activities.  -AS     LTG 5 Patient to improve her right general ankle strength to 4/5.  -AS     LTG 6 Patient to improve her right ankle IV/EV to WNL.  -AS     Time Calculation    PT Goal Re-Cert Due Date 07/07/17  -AS       User Key  (r) = Recorded By, (t) = Taken By, (c) = Cosigned By    Initials Name Provider Type    AS Alfredo Lopez, PT Physical Therapist                PT Assessment/Plan       06/09/17 1300       PT Assessment    Functional Limitations Impaired gait;Impaired locomotion;Limitation in home management;Limitations in community activities;Performance in leisure activities;Performance in self-care ADL;Performance in sport activities;Performance in work activities  -AS     Impairments Edema;Gait;Impaired flexibility;Joint mobility;Muscle strength;Pain;Range of motion  -AS     Assessment Comments Patient presents to outpatient PT s/p bimalleolar fx with ORIF x4 weeks. Patient is wearing a walking boot and is NWB at this time. Patient is ambulating with bilateral axillary crutches at this time. Patient has decreased right ankle/foot ROM, limited right ankle/foot strength, and increased right foot/ankle pain. Patient has decreased function due to the above.  -AS     Please refer to paper survey for additional self-reported information Yes  -AS     Rehab Potential Good  -AS     Patient/caregiver participated in establishment of treatment plan and goals Yes  -AS     Patient would benefit from skilled therapy intervention Yes  -AS     PT Plan    PT Frequency 2x/week  -AS     Predicted Duration of Therapy Intervention (days/wks) 8-12 weeks  -AS     Planned CPT's? PT RE-EVAL: 95386;PT THER PROC EA 15 MIN: 99002;PT THER ACT EA 15 MIN: 04668;PT MANUAL THERAPY EA 15 MIN: 62476;PT NEUROMUSC RE-EDUCATION EA  15 MIN: 92920;PT GAIT TRAINING EA 15 MIN: 76642;PT HOT OR COLD PACK TREAT MCARE;PT ELECTRICAL STIM UNATTEND: ;PT ULTRASOUND EA 15 MIN: 20605  -AS       User Key  (r) = Recorded By, (t) = Taken By, (c) = Cosigned By    Initials Name Provider Type    AS Alfredo Lopez PT Physical Therapist                Modalities       06/09/17 1300          Moist Heat    MH Applied Yes  -AS      Location right ankle/calf  -AS      Rx Minutes 10 mins  -AS      MH Prior to Rx Yes  -AS      Ice    Ice Applied Yes  -AS      Location right ankle  -AS      Rx Minutes 10 mins  -AS      Ice S/P Rx Yes  -AS        User Key  (r) = Recorded By, (t) = Taken By, (c) = Cosigned By    Initials Name Provider Type    AS Alfredo Lopez PT Physical Therapist              Exercises       06/09/17 1300          Subjective Pain    Able to rate subjective pain? yes  -AS      Pre-Treatment Pain Level 3  -AS      Post-Treatment Pain Level 2  -AS      Exercise 1    Exercise Name 1 NWB Gastroc/Soleus Stretch  -AS      Reps 1 10  -AS      Time (Seconds) 1 10 sec hold  -AS      Exercise 2    Exercise Name 2 ABC's  -AS      Reps 2 2   2x upper and 2x lower case  -AS      Exercise 3    Exercise Name 3 Pro Stretch  -AS      Time (Minutes) 3 3 min  -AS      Exercise 4    Exercise Name 4 Towel Curls  -AS      Time (Minutes) 4 3 min  -AS      Exercise 5    Exercise Name 5 Manual  -AS      Time (Minutes) 5 8 min  -AS        User Key  (r) = Recorded By, (t) = Taken By, (c) = Cosigned By    Initials Name Provider Type    AS Alfredo Lopez PT Physical Therapist                              Outcome Measures       06/09/17 1300          Functional Assessment    Outcome Measure Options Lower Extremity Functional Scale (LEFS)  -AS        User Key  (r) = Recorded By, (t) = Taken By, (c) = Cosigned By    Initials Name Provider Type    AS Alfredo Lopez PT Physical Therapist            Time Calculation:   Start Time: 1153  Stop Time: 1312  Time  Calculation (min): 79 min     Therapy Charges for Today     Code Description Service Date Service Provider Modifiers Qty    65286921318 HC PT EVAL LOW COMPLEXITY 4 6/9/2017 Alfredo Lopez, PT GP 1          PT G-Codes  Outcome Measure Options: Lower Extremity Functional Scale (LEFS)         Alfredo Lopez, PT  6/9/2017

## 2017-06-12 ENCOUNTER — HOSPITAL ENCOUNTER (OUTPATIENT)
Dept: PHYSICAL THERAPY | Facility: HOSPITAL | Age: 42
Setting detail: THERAPIES SERIES
Discharge: HOME | End: 2017-06-12

## 2017-06-12 DIAGNOSIS — S82.841A BIMALLEOLAR ANKLE FRACTURE, RIGHT, CLOSED, INITIAL ENCOUNTER: Primary | ICD-10-CM

## 2017-06-12 PROCEDURE — 97140 MANUAL THERAPY 1/> REGIONS: CPT

## 2017-06-12 PROCEDURE — 97110 THERAPEUTIC EXERCISES: CPT

## 2017-06-12 RX ORDER — OXYCODONE AND ACETAMINOPHEN 7.5; 325 MG/1; MG/1
1 TABLET ORAL EVERY 6 HOURS PRN
Qty: 50 TABLET | Refills: 0 | Status: SHIPPED | OUTPATIENT
Start: 2017-06-12 | End: 2017-06-12

## 2017-06-12 RX ORDER — OXYCODONE HYDROCHLORIDE AND ACETAMINOPHEN 5; 325 MG/1; MG/1
1 TABLET ORAL EVERY 6 HOURS PRN
Qty: 40 TABLET | Refills: 0 | Status: SHIPPED | OUTPATIENT
Start: 2017-06-12 | End: 2017-07-20

## 2017-06-12 NOTE — THERAPY TREATMENT NOTE
Outpatient Physical Therapy Ortho Treatment Note  [facility]     Patient Name: Elia Payne  : 1975  MRN: [id number]  Today's Date: 2017      Visit Date: 2017    Visit Dx:    ICD-10-CM ICD-9-CM   1. Bimalleolar ankle fracture, right, closed, initial encounter S82.841A 824.4       Patient Active Problem List   Diagnosis   • Abnormal weight gain   • Anxiety, generalized   • Inflamed seborrheic keratosis   • Acute maxillary sinusitis   • Ankle fracture   • Bimalleolar ankle fracture        Past Medical History:   Diagnosis Date   • Depression    • PONV (postoperative nausea and vomiting)         Past Surgical History:   Procedure Laterality Date   • ABDOMINAL SURGERY     • ANKLE OPEN REDUCTION INTERNAL FIXATION Right 2017    Procedure:  OPEN REDUCTION INTERNAL FIXATION right ankle fractures;  Surgeon: Mega Beck MD;  Location: [facility];  Service:    • ANKLE SURGERY      broken in    • BREAST SURGERY     • CHOLECYSTECTOMY     • COSMETIC SURGERY     • DILATATION AND CURETTAGE     • FRACTURE SURGERY               PT Ortho       17 1300    Precautions and Contraindications    Precautions/Limitations non-weight bearing status  -AS    Posture/Observations    Posture- WNL Posture is WNL  -AS    Observations Ecchymosis/bruising;Edema;Incision healing;Muscle atrophy  -AS    Sensation    Sensation WNL? WNL  -AS    Foot/Ankle Palpation    Distal Tib/Fib Joint Right:;Tender;Swollen  -AS    Lateral Malleolus Right:;Tender;Swollen  -AS    Medial Malleolus Right:;Tender;Swollen  -AS    Plantar Fascia Right:;Tender;Swollen  -AS    Soleus Right:;Tender;Swollen  -AS    Achilles' Tendon Right:;Tender;Swollen  -AS    Toes Right:;Tender;Swollen  -AS    Ankle Accessory Motions    Mid-tarsal dorsal/plantar glide Right:;Hypomobile  -AS    Toes Accessory Motion    MTP Hallux - flexion Right:;Hypomobile  -AS    MTP Hallux - extension Right:;Hypomobile  -AS    MTP 2nd  digit - extension  Right:;Hypomobile  -AS    MTP 3rd digit - flexion Right:;Hypomobile  -AS    MTP 3rd digit - extension Right:;Hypomobile  -AS    MTP 4th digit - flexion Right:;Hypomobile  -AS    MTP 4th digit - extension Right:;Hypomobile  -AS    MTP 5th digit - flexion Right:;Hypomobile  -AS    MTP 5th digit - extension Right:;Hypomobile  -AS    IP great toe Right:;Hypomobile  -AS    PIP 2nd digit Right:;Hypomobile  -AS    PIP 3rd digit Right:;Hypomobile  -AS    PIP 4th digit Right:;Hypomobile  -AS    PIP 5th digit Right:;Hypomobile  -AS    Right Ankle    Dorsiflexion AROM Deficit -23  -AS    Plantarflexion AROM Deficit 7   starts at -30 degrees of PF  -AS    Inversion AROM Deficit 8  -AS    Eversion AROM Deficit 5  -AS    Right Hip    Hip Flexion Gross Movement (4+/5) good plus  -AS    Hip Extension Gross Movement (4/5) good  -AS    Hip ABduction Gross Movement (4/5) good  -AS    Left Knee    Knee Extension Gross Movement (4+/5) good plus  -AS    Knee Flexion Gross Movement (4+/5) good plus  -AS    Right Ankle/Foot    Ankle PF Gross Movement --   Not tested due to surgery and lack of ROM  -AS    Ankle Dorsiflexion Gross Movement --   Not tested due to surgery and lack of ROM  -AS    Subtalar Inversion Gross Movement --   Not tested due to surgery and lack of ROM  -AS    Subtalar Eversion Gross Movement --   Not tested due to surgery and lack of ROM  -AS    Lower Extremity Flexibility    Gastrocnemius Right:;Severely limited  -AS    Soleus Right:;Severely limited  -AS    Ankle Girth    Figure 8- Right 20 inches  -AS    Gait Assessment/Treatment    Gait, Assistive Device axillary crutches  -AS    Gait, Gait Pattern Analysis swing-through gait  -AS    Gait, Maintain Weight Bearing Status able to maintain weight bearing status  -AS    Gait, Comment Patient ambulates with a walking boot on her right LE and she is NWB at this time. Patient ambulates with bilateral axillary crutches using a swing through gait pattern.  -AS      User Key   (r) = Recorded By, (t) = Taken By, (c) = Cosigned By    Initials Name Provider Type    AS Alfredo Lopez PT Physical Therapist                            PT Assessment/Plan       06/12/17 0700       PT Assessment    Assessment Comments Patient is doing well with PT with decreased reports of right foot/ankle pain and increased motion in her right ankle, foot, and toes.  -AS     PT Plan    PT Plan Comments Continue with current treatment plan.   -AS       User Key  (r) = Recorded By, (t) = Taken By, (c) = Cosigned By    Initials Name Provider Type    AS Alfredo Lopez PT Physical Therapist                Modalities       06/12/17 0700          Moist Heat    MH Applied Yes  -AS      Location right ankle/calf  -AS      Rx Minutes 10 mins  -AS      MH Prior to Rx Yes  -AS      Ice    Patient denies application of Ice Yes  -AS        User Key  (r) = Recorded By, (t) = Taken By, (c) = Cosigned By    Initials Name Provider Type    AS Alfredo Lopez PT Physical Therapist                Exercises       06/12/17 0700          Subjective Comments    Subjective Comments Patient states that her foot/ankle   -AS      Exercise 1    Exercise Name 1 NWB Gastroc/Soleus Stretch  -AS      Reps 1 15  -AS      Time (Seconds) 1 10 sec hold  -AS      Exercise 2    Exercise Name 2 ABC's  -AS      Reps 2 2   2x upper and 2x lower case  -AS      Exercise 3    Exercise Name 3 Pro Stretch  -AS      Time (Minutes) 3 4 min  -AS      Exercise 4    Exercise Name 4 Towel Curls  -AS      Time (Minutes) 4 3 min  -AS      Exercise 5    Exercise Name 5 Manual  -AS      Time (Minutes) 5 8 min  -AS        User Key  (r) = Recorded By, (t) = Taken By, (c) = Cosigned By    Initials Name Provider Type    AS Alfredo Lopez PT Physical Therapist                                       Outcome Measures       06/09/17 1300          Functional Assessment    Outcome Measure Options Lower Extremity Functional Scale (LEFS)  -AS        User  Key  (r) = Recorded By, (t) = Taken By, (c) = Cosigned By    Initials Name Provider Type    AS Alfredo Lopez, PT Physical Therapist            Time Calculation:   Start Time: 0656  Stop Time: 0741  Time Calculation (min): 45 min    Therapy Charges for Today     Code Description Service Date Service Provider Modifiers Qty    [id number]  PT THER PROC EA 15 MIN 6/12/2017 Alfredo Lopez, PT GP 1    [id number]  PT MANUAL THERAPY EA 15 MIN 6/12/2017 Alfredo Lopez, PT GP 1                    Alfredo Lopez, PT  6/12/2017

## 2017-06-14 ENCOUNTER — HOSPITAL ENCOUNTER (OUTPATIENT)
Dept: PHYSICAL THERAPY | Facility: HOSPITAL | Age: 42
Setting detail: THERAPIES SERIES
Discharge: HOME | End: 2017-06-14

## 2017-06-14 PROCEDURE — 97140 MANUAL THERAPY 1/> REGIONS: CPT

## 2017-06-14 NOTE — THERAPY TREATMENT NOTE
Outpatient Physical Therapy Ortho Treatment Note   [place]     Patient Name: Elia Payne  : 1975  MRN: [id number]  Today's Date: 2017      Visit Date: 2017    Visit Dx:  No diagnosis found.    Patient Active Problem List   Diagnosis   • Abnormal weight gain   • Anxiety, generalized   • Inflamed seborrheic keratosis   • Acute maxillary sinusitis   • Ankle fracture   • Bimalleolar ankle fracture        Past Medical History:   Diagnosis Date   • Depression    • PONV (postoperative nausea and vomiting)         Past Surgical History:   Procedure Laterality Date   • ABDOMINAL SURGERY     • ANKLE OPEN REDUCTION INTERNAL FIXATION Right 2017    Procedure:  OPEN REDUCTION INTERNAL FIXATION right ankle fractures;  Surgeon: Mega Beck MD;  Location: [facility];  Service:    • ANKLE SURGERY      broken in    • BREAST SURGERY     • CHOLECYSTECTOMY     • COSMETIC SURGERY     • DILATATION AND CURETTAGE     • FRACTURE SURGERY                               PT Assessment/Plan       17 07       PT Assessment    Assessment Comments Patient continues to do well with improved right foot/ankle mobility.  -AS     PT Plan    PT Plan Comments Continue with current treatment plan.   -AS       User Key  (r) = Recorded By, (t) = Taken By, (c) = Cosigned By    Initials Name Provider Type    AS Alfredo Lopez, PT Physical Therapist                Modalities       17 0700          Moist Heat    MH Applied Yes  -AS      Location right ankle/calf  -AS      Rx Minutes 10 mins  -AS      MH Prior to Rx Yes  -AS      Ice    Ice Applied Yes  -AS      Location right ankle  -AS      Rx Minutes 10 mins  -AS      Ice S/P Rx Yes  -AS        User Key  (r) = Recorded By, (t) = Taken By, (c) = Cosigned By    Initials Name Provider Type    AS Alfredo Lopez, PT Physical Therapist                Exercises       17 07          Subjective Comments    Subjective Comments Patient states  that her foot/ankle hurt really bad yesterday. She states that it does feel better today but states yesterday was one of the worst days she has had since surgery.  -AS      Exercise 1    Exercise Name 1 NWB Gastroc/Soleus Stretch  -AS      Reps 1 15  -AS      Time (Seconds) 1 10 sec hold  -AS      Exercise 2    Exercise Name 2 ABC's  -AS      Reps 2 2   2x upper and 2x lower case  -AS      Exercise 3    Exercise Name 3 Pro Stretch  -AS      Time (Minutes) 3 4 min  -AS      Exercise 4    Exercise Name 4 Towel Curls  -AS      Time (Minutes) 4 3 min  -AS      Exercise 5    Exercise Name 5 Manual  -AS      Time (Minutes) 5 8 min  -AS        User Key  (r) = Recorded By, (t) = Taken By, (c) = Cosigned By    Initials Name Provider Type    AS Alfredo Lopez, PT Physical Therapist                                       Time Calculation:   Start Time: 0702  Stop Time: 0743  Time Calculation (min): 41 min    Therapy Charges for Today     Code Description Service Date Service Provider Modifiers Qty    53951261542 HC PT MANUAL THERAPY EA 15 MIN 6/14/2017 Alfredo Lopez, PT GP 1                    Alfredo Lopez, PT  6/14/2017

## 2017-06-26 ENCOUNTER — HOSPITAL ENCOUNTER (OUTPATIENT)
Dept: PHYSICAL THERAPY | Facility: HOSPITAL | Age: 42
Setting detail: THERAPIES SERIES
Discharge: HOME | End: 2017-06-26

## 2017-06-26 DIAGNOSIS — S82.841A BIMALLEOLAR ANKLE FRACTURE, RIGHT, CLOSED, INITIAL ENCOUNTER: Primary | ICD-10-CM

## 2017-06-26 PROCEDURE — 97140 MANUAL THERAPY 1/> REGIONS: CPT

## 2017-06-26 NOTE — THERAPY TREATMENT NOTE
Outpatient Physical Therapy Ortho Treatment Note   [place]     Patient Name: Elia Payne  : 1975  MRN: [id number]  Today's Date: 2017      Visit Date: 2017    Visit Dx:    ICD-10-CM ICD-9-CM   1. Bimalleolar ankle fracture, right, closed, initial encounter S82.841A 824.4       Patient Active Problem List   Diagnosis   • Abnormal weight gain   • Anxiety, generalized   • Inflamed seborrheic keratosis   • Acute maxillary sinusitis   • Ankle fracture   • Bimalleolar ankle fracture        Past Medical History:   Diagnosis Date   • Depression    • PONV (postoperative nausea and vomiting)         Past Surgical History:   Procedure Laterality Date   • ABDOMINAL SURGERY     • ANKLE OPEN REDUCTION INTERNAL FIXATION Right 2017    Procedure:  OPEN REDUCTION INTERNAL FIXATION right ankle fractures;  Surgeon: Mega Beck MD;  Location: [facility];  Service:    • ANKLE SURGERY      broken in    • BREAST SURGERY     • CHOLECYSTECTOMY     • COSMETIC SURGERY     • DILATATION AND CURETTAGE     • FRACTURE SURGERY                               PT Assessment/Plan       17 0800       PT Assessment    Assessment Comments Patient is reporting she feels her foot/ankle and toes are moving more. Patient continues to do well with PT.  -AS     PT Plan    PT Plan Comments Continue with current treatment plan.   -AS       User Key  (r) = Recorded By, (t) = Taken By, (c) = Cosigned By    Initials Name Provider Type    AS Alfredo Lopez, PT Physical Therapist                Modalities       17 0800          Moist Heat    MH Applied --  -AS      Location --  -AS      Rx Minutes --  -AS      MH Prior to Rx --  -AS      Ice    Ice Applied --  -AS      Location --  -AS      Rx Minutes --  -AS      Ice S/P Rx --  -AS        User Key  (r) = Recorded By, (t) = Taken By, (c) = Cosigned By    Initials Name Provider Type    AS Alfredo Lopez, PT Physical Therapist                Exercises        06/26/17 0800          Subjective Comments    Subjective Comments Patient states that her ankle is feeling stiff this morning but states she was compliant with her HEP and her WB restrictions.  -AS      Exercise 1    Exercise Name 1 NWB Gastroc/Soleus Stretch  -AS      Reps 1 15  -AS      Time (Seconds) 1 10 sec hold  -AS      Exercise 2    Exercise Name 2 ABC's  -AS      Reps 2 2   2x upper and 2x lower case  -AS      Exercise 3    Exercise Name 3 Pro Stretch  -AS      Time (Minutes) 3 4 min  -AS      Exercise 4    Exercise Name 4 Towel Curls  -AS      Time (Minutes) 4 4 min  -AS      Exercise 5    Exercise Name 5 Manual  -AS      Time (Minutes) 5 8 min  -AS        User Key  (r) = Recorded By, (t) = Taken By, (c) = Cosigned By    Initials Name Provider Type    AS Alfredo Lopez, PT Physical Therapist                                       Time Calculation:   Start Time: 0830  Stop Time: 0912  Time Calculation (min): 42 min    Therapy Charges for Today     Code Description Service Date Service Provider Modifiers Qty    95038810783 HC PT MANUAL THERAPY EA 15 MIN 6/26/2017 Alfredo Lopez, PT GP 1                    Alfredo Lopez, PT  6/26/2017

## 2017-06-28 ENCOUNTER — HOSPITAL ENCOUNTER (OUTPATIENT)
Dept: PHYSICAL THERAPY | Facility: HOSPITAL | Age: 42
Setting detail: THERAPIES SERIES
Discharge: HOME | End: 2017-06-28

## 2017-06-28 ENCOUNTER — OFFICE VISIT (OUTPATIENT)
Dept: ORTHOPEDIC SURGERY | Facility: CLINIC | Age: 42
End: 2017-06-28

## 2017-06-28 DIAGNOSIS — S82.891A ANKLE FRACTURE, RIGHT, CLOSED, INITIAL ENCOUNTER: Primary | ICD-10-CM

## 2017-06-28 DIAGNOSIS — S82.841A BIMALLEOLAR ANKLE FRACTURE, RIGHT, CLOSED, INITIAL ENCOUNTER: ICD-10-CM

## 2017-06-28 DIAGNOSIS — S82.841A BIMALLEOLAR ANKLE FRACTURE, RIGHT, CLOSED, INITIAL ENCOUNTER: Primary | ICD-10-CM

## 2017-06-28 DIAGNOSIS — Z09 STATUS POST ORTHOPEDIC SURGERY, FOLLOW-UP EXAM: ICD-10-CM

## 2017-06-28 PROCEDURE — 97140 MANUAL THERAPY 1/> REGIONS: CPT

## 2017-06-28 PROCEDURE — 97110 THERAPEUTIC EXERCISES: CPT

## 2017-06-28 PROCEDURE — 99024 POSTOP FOLLOW-UP VISIT: CPT | Performed by: ORTHOPAEDIC SURGERY

## 2017-06-28 PROCEDURE — 73610 X-RAY EXAM OF ANKLE: CPT | Performed by: ORTHOPAEDIC SURGERY

## 2017-06-28 NOTE — PROGRESS NOTES
Subjective: Status post ORIF right bimalleolar ankle fracture     Patient ID: Elia Payne is a 41 y.o. female.    Chief Complaint:    History of Present Illness patient is 6 weeks out doing well with minimal to no pain.  She has been nonweightbearing in a fracture boot.       Social History     Occupational History   • Not on file.     Social History Main Topics   • Smoking status: Never Smoker   • Smokeless tobacco: Not on file   • Alcohol use Yes      Comment: socially   • Drug use: No   • Sexual activity: Defer      Review of Systems   Constitutional: Negative for chills, diaphoresis, fever and unexpected weight change.   HENT: Negative for hearing loss, nosebleeds, sore throat and tinnitus.    Eyes: Negative for pain and visual disturbance.   Respiratory: Negative for cough, shortness of breath and wheezing.    Cardiovascular: Negative for chest pain and palpitations.   Gastrointestinal: Negative for abdominal pain, diarrhea, nausea and vomiting.   Endocrine: Negative for cold intolerance, heat intolerance and polydipsia.   Genitourinary: Negative for difficulty urinating, dysuria and hematuria.   Musculoskeletal: Negative for arthralgias, joint swelling and myalgias.   Skin: Negative for rash and wound.   Allergic/Immunologic: Negative for environmental allergies.   Neurological: Negative for dizziness, syncope and numbness.   Hematological: Does not bruise/bleed easily.   Psychiatric/Behavioral: Negative for dysphoric mood and sleep disturbance. The patient is not nervous/anxious.          Past Medical History:   Diagnosis Date   • Depression    • PONV (postoperative nausea and vomiting)      Past Surgical History:   Procedure Laterality Date   • ABDOMINAL SURGERY     • ANKLE OPEN REDUCTION INTERNAL FIXATION Right 5/13/2017    Procedure:  OPEN REDUCTION INTERNAL FIXATION right ankle fractures;  Surgeon: Mega Beck MD;  Location: [facility];  Service:    • ANKLE SURGERY      broken in 1994   •  BREAST SURGERY     • CHOLECYSTECTOMY     • COSMETIC SURGERY     • DILATATION AND CURETTAGE     • FRACTURE SURGERY       No family history on file.      Objective:  There were no vitals filed for this visit.  There were no vitals filed for this visit.  There is no height or weight on file to calculate BMI.       Ortho Exam AP lateral oblique view of the ankle shows anatomical reduction of the fracture early callus formation.  Compared to previous x-rays of fracture lines or not is visible.  On exam she is alert and oriented ×3.  His no swelling effusion erythema or calf is nontender with a negative Homans.  All wounds are benign.  No sensory loss no motor loss good distal pulses.     Assessment:       1. Ankle fracture, right, closed, initial encounter    2. Bimalleolar ankle fracture, right, closed, initial encounter    3. Status post orthopedic surgery, follow-up exam          Plan:        Begin weightbearing as tolerated and that was emphasized as tolerated.  Return in 3 weeks with repeat x-ray the ankle.  When she is walking in the fracture boot with no pain we'll convert to an active ankle brace.  Discussed this with her and she is in agreement.    Work Status:    Pecabu query complete.    Orders:  Orders Placed This Encounter   Procedures   • XR Ankle 3+ View Right   • Ambulatory Referral to Physical Therapy Evaluate and treat       Medications:  No orders of the defined types were placed in this encounter.      Followup:  Return in about 3 weeks (around 7/19/2017).          Dragon transcription disclaimer     Much of this encounter note is an electronic transcription/translation of spoken language to printed text. The electronic translation of spoken language may permit erroneous, or at times, nonsensical words or phrases to be inadvertently transcribed. Although I have reviewed the note for such errors, some may still exist.

## 2017-06-28 NOTE — THERAPY TREATMENT NOTE
Outpatient Physical Therapy Ortho Treatment Note   [place]     Patient Name: Elia Payne  : 1975  MRN: [id number]  Today's Date: 2017      Visit Date: 2017    Visit Dx:    ICD-10-CM ICD-9-CM   1. Bimalleolar ankle fracture, right, closed, initial encounter S82.841A 824.4       Patient Active Problem List   Diagnosis   • Abnormal weight gain   • Anxiety, generalized   • Inflamed seborrheic keratosis   • Acute maxillary sinusitis   • Ankle fracture   • Bimalleolar ankle fracture        Past Medical History:   Diagnosis Date   • Depression    • PONV (postoperative nausea and vomiting)         Past Surgical History:   Procedure Laterality Date   • ABDOMINAL SURGERY     • ANKLE OPEN REDUCTION INTERNAL FIXATION Right 2017    Procedure:  OPEN REDUCTION INTERNAL FIXATION right ankle fractures;  Surgeon: Mega Beck MD;  Location: [facility];  Service:    • ANKLE SURGERY      broken in    • BREAST SURGERY     • CHOLECYSTECTOMY     • COSMETIC SURGERY     • DILATATION AND CURETTAGE     • FRACTURE SURGERY                               PT Assessment/Plan       17 1300       PT Assessment    Assessment Comments Initiated gait training today as patient is now WBAT. Patient continues to use crutches with ambulation and states she is placing about 25% of her body weight on right LE while ambulating. Progressed patient with ROM exercises today without complaints.  -AS     PT Plan    PT Plan Comments Continue with current treatment plan.   -AS       User Key  (r) = Recorded By, (t) = Taken By, (c) = Cosigned By    Initials Name Provider Type    AS Alfredo Lopez, PT Physical Therapist                    Exercises       17 1300          Subjective Comments    Subjective Comments Patient states that she just came from her MD appointment and she is now WBAT.  -AS      Exercise 1    Exercise Name 1 NWB Gastroc/Soleus Stretch  -AS      Reps 1 15  -AS      Time (Seconds) 1 10  sec hold  -AS      Exercise 2    Exercise Name 2 ABC's  -AS      Reps 2 3   2x upper and 2x lower case  -AS      Exercise 3    Exercise Name 3 Pro Stretch  -AS      Time (Minutes) 3 5 min  -AS      Exercise 4    Exercise Name 4 Towel Curls  -AS      Time (Minutes) 4 5 min  -AS      Exercise 5    Exercise Name 5 Manual  -AS      Time (Minutes) 5 10 min  -AS      Exercise 6    Exercise Name 6 4-Way Rocker Board  -AS      Reps 6 20  -AS      Exercise 7    Exercise Name 7 Big Toe Lifts  -AS      Reps 7 Other   40  -AS        User Key  (r) = Recorded By, (t) = Taken By, (c) = Cosigned By    Initials Name Provider Type    AS Alfredo Lopez, PT Physical Therapist                                       Time Calculation:   Start Time: 1122  Stop Time: 1230  Time Calculation (min): 68 min    Therapy Charges for Today     Code Description Service Date Service Provider Modifiers Qty    20597140044  PT THER PROC EA 15 MIN 6/28/2017 Alfredo Lopez, PT GP 1    [id number]  PT MANUAL THERAPY EA 15 MIN 6/28/2017 Alfredo Lopez, PT GP 1                    Alfredo Lopez, PT  6/28/2017

## 2017-07-03 ENCOUNTER — HOSPITAL ENCOUNTER (OUTPATIENT)
Dept: PHYSICAL THERAPY | Facility: HOSPITAL | Age: 42
Setting detail: THERAPIES SERIES
Discharge: HOME | End: 2017-07-03

## 2017-07-03 DIAGNOSIS — S82.841A BIMALLEOLAR ANKLE FRACTURE, RIGHT, CLOSED, INITIAL ENCOUNTER: Primary | ICD-10-CM

## 2017-07-03 PROCEDURE — 97140 MANUAL THERAPY 1/> REGIONS: CPT

## 2017-07-03 PROCEDURE — 97110 THERAPEUTIC EXERCISES: CPT

## 2017-07-03 NOTE — THERAPY TREATMENT NOTE
Outpatient Physical Therapy Ortho Treatment Note   [place]     Patient Name: Elia Payne  : 1975  MRN: [id number]  Today's Date: 7/3/2017      Visit Date: 2017    Visit Dx:    ICD-10-CM ICD-9-CM   1. Bimalleolar ankle fracture, right, closed, initial encounter S82.841A 824.4       Patient Active Problem List   Diagnosis   • Abnormal weight gain   • Anxiety, generalized   • Inflamed seborrheic keratosis   • Acute maxillary sinusitis   • Ankle fracture   • Bimalleolar ankle fracture        Past Medical History:   Diagnosis Date   • Depression    • PONV (postoperative nausea and vomiting)         Past Surgical History:   Procedure Laterality Date   • ABDOMINAL SURGERY     • ANKLE OPEN REDUCTION INTERNAL FIXATION Right 2017    Procedure:  OPEN REDUCTION INTERNAL FIXATION right ankle fractures;  Surgeon: Mega Beck MD;  Location: [facility];  Service:    • ANKLE SURGERY      broken in    • BREAST SURGERY     • CHOLECYSTECTOMY     • COSMETIC SURGERY     • DILATATION AND CURETTAGE     • FRACTURE SURGERY                               PT Assessment/Plan       17 1000       PT Assessment    Assessment Comments Patient ambulated into the clinic today with partial WB through her right LE. Patient ambulates with a very forward flexed posture. Explained to patient that this is most likely causing her right hip and low back pain that just started over the weekend. Educated patient again on proper gait techniques with her crutches and gradually increasing her weight she places through her right LE during ambulation.  -AS     PT Plan    PT Plan Comments Continue with current treatment plan.   -AS       User Key  (r) = Recorded By, (t) = Taken By, (c) = Cosigned By    Initials Name Provider Type    AS Alfredo Lopez, PT Physical Therapist                    Exercises       17 1000          Subjective Comments    Subjective Comments Patient states her right hip and low back  are starting to hurt.  -AS      Exercise 1    Exercise Name 1 NWB Gastroc/Soleus Stretch  -AS      Reps 1 15  -AS      Time (Seconds) 1 10 sec hold  -AS      Exercise 2    Exercise Name 2 ABC's  -AS      Reps 2 3   2x upper and 2x lower case  -AS      Exercise 3    Exercise Name 3 Pro Stretch  -AS      Time (Minutes) 3 5 min  -AS      Exercise 4    Exercise Name 4 Towel Curls  -AS      Time (Minutes) 4 5 min  -AS      Exercise 5    Exercise Name 5 Manual  -AS      Time (Minutes) 5 10 min  -AS      Exercise 6    Exercise Name 6 4-Way Rocker Board  -AS      Reps 6 25  -AS      Exercise 7    Exercise Name 7 Big Toe Lifts  -AS      Reps 7 Other   40  -AS      Exercise 8    Exercise Name 8 DF/PF vs Band  -AS      Equipment 8 Theraband  -AS      Resistance 8 Blue  -AS      Reps 8 15  -AS      Exercise 9    Exercise Name 9 IV/EV vs band  -AS      Equipment 9 Theraband  -AS      Resistance 9 Yellow  -AS      Reps 9 15  -AS        User Key  (r) = Recorded By, (t) = Taken By, (c) = Cosigned By    Initials Name Provider Type    AS Alfredo Lopez, PT Physical Therapist                                       Time Calculation:   Start Time: 0959  Stop Time: 1104  Time Calculation (min): 65 min    Therapy Charges for Today     Code Description Service Date Service Provider Modifiers Qty    22695045353  PT THER PROC EA 15 MIN 7/3/2017 Alfredo Lopez, PT GP 1    [id number]  PT MANUAL THERAPY EA 15 MIN 7/3/2017 Alfredo Lopez, PT GP 1                    Alfredo Lopez, PT  7/3/2017

## 2017-07-05 ENCOUNTER — HOSPITAL ENCOUNTER (OUTPATIENT)
Dept: PHYSICAL THERAPY | Facility: HOSPITAL | Age: 42
Setting detail: THERAPIES SERIES
Discharge: HOME | End: 2017-07-05

## 2017-07-05 DIAGNOSIS — S82.841A BIMALLEOLAR ANKLE FRACTURE, RIGHT, CLOSED, INITIAL ENCOUNTER: Primary | ICD-10-CM

## 2017-07-05 PROCEDURE — 97110 THERAPEUTIC EXERCISES: CPT

## 2017-07-05 PROCEDURE — 97140 MANUAL THERAPY 1/> REGIONS: CPT

## 2017-07-05 NOTE — THERAPY TREATMENT NOTE
Outpatient Physical Therapy Ortho Treatment Note   [place]     Patient Name: Elia Payne  : 1975  MRN: [id number]  Today's Date: 2017      Visit Date: 2017    Visit Dx:    ICD-10-CM ICD-9-CM   1. Bimalleolar ankle fracture, right, closed, initial encounter S82.841A 824.4       Patient Active Problem List   Diagnosis   • Abnormal weight gain   • Anxiety, generalized   • Inflamed seborrheic keratosis   • Acute maxillary sinusitis   • Ankle fracture   • Bimalleolar ankle fracture        Past Medical History:   Diagnosis Date   • Depression    • PONV (postoperative nausea and vomiting)         Past Surgical History:   Procedure Laterality Date   • ABDOMINAL SURGERY     • ANKLE OPEN REDUCTION INTERNAL FIXATION Right 2017    Procedure:  OPEN REDUCTION INTERNAL FIXATION right ankle fractures;  Surgeon: Mega Beck MD;  Location: [facility];  Service:    • ANKLE SURGERY      broken in    • BREAST SURGERY     • CHOLECYSTECTOMY     • COSMETIC SURGERY     • DILATATION AND CURETTAGE     • FRACTURE SURGERY                               PT Assessment/Plan       17 1100       PT Assessment    Assessment Comments Patient continues to do well with PT with improved right foot/ankle and toe mobility.  -AS     PT Plan    PT Plan Comments Continue with current treatment plan.   -AS       User Key  (r) = Recorded By, (t) = Taken By, (c) = Cosigned By    Initials Name Provider Type    AS Alfredo Lopez, PT Physical Therapist                    Exercises       17 1100          Subjective Comments    Subjective Comments Patient reports her foot is improving. She states she is noticing improved right foot/ ankle and toe movement. Patient states she also is increasing her WB while ambulating.  -AS      Exercise 1    Exercise Name 1 NWB Gastroc/Soleus Stretch  -AS      Reps 1 15  -AS      Time (Seconds) 1 10 sec hold  -AS      Exercise 2    Exercise Name 2 ABC's  -AS      Reps 2 3    2x upper and 2x lower case  -AS      Exercise 3    Exercise Name 3 Pro Stretch  -AS      Time (Minutes) 3 5 min  -AS      Exercise 4    Exercise Name 4 Towel Curls  -AS      Time (Minutes) 4 5 min  -AS      Exercise 5    Exercise Name 5 Manual  -AS      Time (Minutes) 5 10 min  -AS      Exercise 6    Exercise Name 6 4-Way Rocker Board  -AS      Reps 6 30  -AS      Exercise 7    Exercise Name 7 Big Toe Lifts  -AS      Reps 7 Other   40  -AS      Exercise 8    Exercise Name 8 DF/PF vs Band  -AS      Equipment 8 Theraband  -AS      Resistance 8 Blue  -AS      Reps 8 20  -AS      Exercise 9    Exercise Name 9 IV/EV vs band  -AS      Equipment 9 Theraband  -AS      Resistance 9 Yellow  -AS      Reps 9 20  -AS        User Key  (r) = Recorded By, (t) = Taken By, (c) = Cosigned By    Initials Name Provider Type    AS Alfredo Lopez, PT Physical Therapist                                       Time Calculation:   Start Time: 1002  Stop Time: 1053  Time Calculation (min): 51 min    Therapy Charges for Today     Code Description Service Date Service Provider Modifiers Qty    25214906920 HC PT THER PROC EA 15 MIN 7/5/2017 Alfredo Lopez, PT GP 1    [id number] HC PT MANUAL THERAPY EA 15 MIN 7/5/2017 Alfredo Lopez, PT GP 1                    Alfredo Lopez, PT  7/5/2017

## 2017-07-10 ENCOUNTER — HOSPITAL ENCOUNTER (OUTPATIENT)
Dept: PHYSICAL THERAPY | Facility: HOSPITAL | Age: 42
Setting detail: THERAPIES SERIES
Discharge: HOME | End: 2017-07-10

## 2017-07-10 DIAGNOSIS — S82.841A BIMALLEOLAR ANKLE FRACTURE, RIGHT, CLOSED, INITIAL ENCOUNTER: Primary | ICD-10-CM

## 2017-07-10 PROCEDURE — 97110 THERAPEUTIC EXERCISES: CPT

## 2017-07-10 PROCEDURE — 97140 MANUAL THERAPY 1/> REGIONS: CPT

## 2017-07-10 NOTE — THERAPY RE-EVALUATION
Outpatient Physical Therapy Ortho Re-Evaluation   [place]     Patient Name: Elia Payne  : 1975  MRN: [id number]  Today's Date: 7/10/2017      Visit Date: 07/10/2017    Patient Active Problem List   Diagnosis   • Abnormal weight gain   • Anxiety, generalized   • Inflamed seborrheic keratosis   • Acute maxillary sinusitis   • Ankle fracture   • Bimalleolar ankle fracture        Past Medical History:   Diagnosis Date   • Depression    • PONV (postoperative nausea and vomiting)         Past Surgical History:   Procedure Laterality Date   • ABDOMINAL SURGERY     • ANKLE OPEN REDUCTION INTERNAL FIXATION Right 2017    Procedure:  OPEN REDUCTION INTERNAL FIXATION right ankle fractures;  Surgeon: Mega Beck MD;  Location: [facility];  Service:    • ANKLE SURGERY      broken in    • BREAST SURGERY     • CHOLECYSTECTOMY     • COSMETIC SURGERY     • DILATATION AND CURETTAGE     • FRACTURE SURGERY         Visit Dx:     ICD-10-CM ICD-9-CM   1. Bimalleolar ankle fracture, right, closed, initial encounter S82.841A 824.4                 PT Ortho       07/10/17 0900    Right Ankle    Dorsiflexion AROM Deficit -10  -AS    Plantarflexion AROM Deficit 25  -AS    Inversion AROM Deficit 8  -AS    Eversion AROM Deficit 6  -AS    Right Hip    Hip Flexion Gross Movement (5/5) normal  -AS    Hip Extension Gross Movement (5/5) normal  -AS    Hip ABduction Gross Movement (5/5) normal  -AS    Left Knee    Knee Extension Gross Movement (5/5) normal  -AS    Knee Flexion Gross Movement (5/5) normal  -AS      User Key  (r) = Recorded By, (t) = Taken By, (c) = Cosigned By    Initials Name Provider Type    AS Alfredo Lopez, PT Physical Therapist                                PT OP Goals       07/10/17 0900       PT Short Term Goals    STG Date to Achieve 17  -AS     STG 1 Patient to be compliant with her initial HEP for flexibility, ROM , and strengthening.  -AS     STG 1 Progress Met  -AS     STG 2  Patient to improve her right ankle DF ROM to neutral.  -AS     STG 2 Progress Ongoing;Progressing  -AS     STG 3 Patient to report pain on VAS of 4-5/10 when performing ADL's and work related activities.  -AS     STG 3 Progress Met  -AS     STG 4 Patient to improve her right general ankle strength to 4-/5.  -AS     STG 4 Progress Ongoing;Progressing  -AS     STG 5 Patient to improve her right ankle IV/EV to within 5 degrees of her contralateral ankle.  -AS     STG 5 Progress Ongoing;Progressing  -AS     Long Term Goals    LTG Date to Achieve 08/21/17  -AS     LTG 1 Patient to be compliant with her advanced HEP for flexibility, ROM , and strengthening.  -AS     LTG 1 Progress Ongoing;Progressing  -AS     LTG 2 Patient to improve her right ankle DF ROM to 5-10 degrees.  -AS     LTG 2 Progress Ongoing;Progressing  -AS     LTG 3 Patient to ambulate with normal heel to toe gait pattern community distances without walking boot and without crutches.  -AS     LTG 3 Progress Ongoing;Progressing  -AS     LTG 4 Patient to report pain on VAS of 1-2/10 when performing ADL's and work related activities.  -AS     LTG 4 Progress Met  -AS     LTG 5 Patient to improve her right general ankle strength to 4/5.  -AS     LTG 6 Patient to improve her right ankle IV/EV to WNL.  -AS     LTG 6 Progress Ongoing;Progressing  -AS     Time Calculation    PT Goal Re-Cert Due Date 08/07/17  -AS       User Key  (r) = Recorded By, (t) = Taken By, (c) = Cosigned By    Initials Name Provider Type    AS Alfredo Lopez, PT Physical Therapist                PT Assessment/Plan       07/10/17 0900       PT Assessment    Functional Limitations Impaired gait;Impaired locomotion;Limitation in home management;Limitations in community activities;Performance in leisure activities;Performance in self-care ADL;Performance in sport activities;Performance in work activities  -AS     Impairments Edema;Gait;Impaired flexibility;Joint integrity;Muscle  strength;Pain;Range of motion  -AS     Assessment Comments Patient continues to do well with PT with improved right foot/ankle and toe mobility. Patient continues to report improved pain with improved function. Patient continues to wean herself from her crutches at this time with increased right LE weight bearing during ambulation..  -AS     Please refer to paper survey for additional self-reported information Yes  -AS     Rehab Potential Good  -AS     Patient/caregiver participated in establishment of treatment plan and goals Yes  -AS     Patient would benefit from skilled therapy intervention Yes  -AS     PT Plan    PT Frequency 2x/week  -AS     Predicted Duration of Therapy Intervention (days/wks) 6-8 weeks  -AS     Planned CPT's? PT RE-EVAL: 92951;PT THER PROC EA 15 MIN: 36111;PT THER ACT EA 15 MIN: 55563;PT MANUAL THERAPY EA 15 MIN: 01111;PT NEUROMUSC RE-EDUCATION EA 15 MIN: 96396;PT GAIT TRAINING EA 15 MIN: 96103;PT HOT OR COLD PACK TREAT MCARE;PT ELECTRICAL STIM UNATTEND: ;PT ULTRASOUND EA 15 MIN: 14214  -AS       User Key  (r) = Recorded By, (t) = Taken By, (c) = Cosigned By    Initials Name Provider Type    AS Alfredo Lopez, PT Physical Therapist                  Exercises       07/10/17 0900          Subjective Comments    Subjective Comments Patient states her pain has decreased greatly over past few days. Patient states she has been placing more weight through her right LE.  -AS      Exercise 1    Exercise Name 1 NWB Gastroc/Soleus Stretch  -AS      Reps 1 15  -AS      Time (Seconds) 1 10 sec hold  -AS      Exercise 2    Exercise Name 2 ABC's  -AS      Reps 2 3   2x upper and 2x lower case  -AS      Exercise 3    Exercise Name 3 Pro Stretch  -AS      Time (Minutes) 3 5 min  -AS      Exercise 4    Exercise Name 4 Towel Curls  -AS      Time (Minutes) 4 5 min  -AS      Exercise 5    Exercise Name 5 Manual  -AS      Time (Minutes) 5 10 min  -AS      Exercise 6    Exercise Name 6 4-Way Rocker  Board  -AS      Reps 6 30  -AS      Exercise 7    Exercise Name 7 Big Toe Lifts  -AS      Reps 7 Other   40  -AS      Exercise 8    Exercise Name 8 DF/PF vs Band  -AS      Equipment 8 Theraband  -AS      Resistance 8 Blue  -AS      Reps 8 20  -AS      Exercise 9    Exercise Name 9 IV/EV vs band  -AS      Equipment 9 Theraband  -AS      Resistance 9 Yellow  -AS      Reps 9 20  -AS        User Key  (r) = Recorded By, (t) = Taken By, (c) = Cosigned By    Initials Name Provider Type    AS Alfredo Lopez, PT Physical Therapist                              Time Calculation:   Start Time: 0931  Stop Time: 1029  Time Calculation (min): 58 min     Therapy Charges for Today     Code Description Service Date Service Provider Modifiers Qty    96364118948  PT THER PROC EA 15 MIN 7/10/2017 Alfredo Lopez, PT GP 1    [id number]  PT MANUAL THERAPY EA 15 MIN 7/10/2017 Alfredo Lopez, PT GP 1                    Alfredo Lopez, PT  7/10/2017

## 2017-07-12 ENCOUNTER — HOSPITAL ENCOUNTER (OUTPATIENT)
Dept: PHYSICAL THERAPY | Facility: HOSPITAL | Age: 42
Setting detail: THERAPIES SERIES
Discharge: HOME | End: 2017-07-12

## 2017-07-12 DIAGNOSIS — S82.841A BIMALLEOLAR ANKLE FRACTURE, RIGHT, CLOSED, INITIAL ENCOUNTER: Primary | ICD-10-CM

## 2017-07-12 PROCEDURE — 97110 THERAPEUTIC EXERCISES: CPT

## 2017-07-12 PROCEDURE — 97140 MANUAL THERAPY 1/> REGIONS: CPT

## 2017-07-12 NOTE — THERAPY TREATMENT NOTE
Outpatient Physical Therapy Ortho Treatment Note   [place]     Patient Name: Elia Payne  : 1975  MRN: [id number]  Today's Date: 2017      Visit Date: 2017    Visit Dx:    ICD-10-CM ICD-9-CM   1. Bimalleolar ankle fracture, right, closed, initial encounter S82.841A 824.4       Patient Active Problem List   Diagnosis   • Abnormal weight gain   • Anxiety, generalized   • Inflamed seborrheic keratosis   • Acute maxillary sinusitis   • Ankle fracture   • Bimalleolar ankle fracture        Past Medical History:   Diagnosis Date   • Depression    • PONV (postoperative nausea and vomiting)         Past Surgical History:   Procedure Laterality Date   • ABDOMINAL SURGERY     • ANKLE OPEN REDUCTION INTERNAL FIXATION Right 2017    Procedure:  OPEN REDUCTION INTERNAL FIXATION right ankle fractures;  Surgeon: Mega Beck MD;  Location: [facility];  Service:    • ANKLE SURGERY      broken in    • BREAST SURGERY     • CHOLECYSTECTOMY     • COSMETIC SURGERY     • DILATATION AND CURETTAGE     • FRACTURE SURGERY               PT Ortho       07/10/17 0900    Right Ankle    Dorsiflexion AROM Deficit -10  -AS    Plantarflexion AROM Deficit 25  -AS    Inversion AROM Deficit 8  -AS    Eversion AROM Deficit 6  -AS    Right Hip    Hip Flexion Gross Movement (5/5) normal  -AS    Hip Extension Gross Movement (5/5) normal  -AS    Hip ABduction Gross Movement (5/5) normal  -AS    Left Knee    Knee Extension Gross Movement (5/5) normal  -AS    Knee Flexion Gross Movement (5/5) normal  -AS      User Key  (r) = Recorded By, (t) = Taken By, (c) = Cosigned By    Initials Name Provider Type    AS Alfredo Lopez, PT Physical Therapist                            PT Assessment/Plan       17 1000       PT Assessment    Assessment Comments Progressed patient with WB activities today without complaints. Patient continues to do well with PT at this time.  -AS     PT Plan    PT Plan Comments Continue  with current treatment plan.  -AS       User Key  (r) = Recorded By, (t) = Taken By, (c) = Cosigned By    Initials Name Provider Type    AS Alfredo Lopez, PT Physical Therapist                    Exercises       07/12/17 1000          Subjective Comments    Subjective Comments Patient states that her foot and ankle is a little sore today.  -AS      Exercise 1    Exercise Name 1 NWB Gastroc/Soleus Stretch  -AS      Reps 1 15  -AS      Time (Seconds) 1 10 sec hold  -AS      Exercise 2    Exercise Name 2 ABC's  -AS      Reps 2 3   2x upper and 2x lower case  -AS      Exercise 3    Exercise Name 3 Pro Stretch  -AS      Time (Minutes) 3 5 min  -AS      Exercise 4    Exercise Name 4 Towel Curls  -AS      Time (Minutes) 4 5 min  -AS      Exercise 5    Exercise Name 5 Manual  -AS      Time (Minutes) 5 10 min  -AS      Exercise 6    Exercise Name 6 4-Way Rocker Board  -AS      Reps 6 30  -AS      Exercise 7    Exercise Name 7 Big Toe Lifts  -AS      Reps 7 Other   40  -AS      Exercise 8    Exercise Name 8 DF/PF vs Band  -AS      Equipment 8 Theraband  -AS      Resistance 8 Blue  -AS      Reps 8 20  -AS      Exercise 9    Exercise Name 9 IV/EV vs band  -AS      Equipment 9 Theraband  -AS      Resistance 9 Yellow  -AS      Reps 9 20  -AS        User Key  (r) = Recorded By, (t) = Taken By, (c) = Cosigned By    Initials Name Provider Type    AS Alfredo Lopez, PT Physical Therapist                                       Time Calculation:   Start Time: 0907  Stop Time: 1017  Time Calculation (min): 70 min    Therapy Charges for Today     Code Description Service Date Service Provider Modifiers Qty    16596254638  PT THER PROC EA 15 MIN 7/12/2017 Alfredo Lopez, PT GP 1    [id number]  PT MANUAL THERAPY EA 15 MIN 7/12/2017 Alfredo Lopez, PT GP 1                    Alfredo Lopez, PT  7/12/2017

## 2017-07-14 ENCOUNTER — OFFICE VISIT (OUTPATIENT)
Dept: ORTHOPEDIC SURGERY | Facility: CLINIC | Age: 42
End: 2017-07-14

## 2017-07-14 ENCOUNTER — TELEPHONE (OUTPATIENT)
Dept: ORTHOPEDIC SURGERY | Facility: CLINIC | Age: 42
End: 2017-07-14

## 2017-07-14 DIAGNOSIS — Z87.81 STATUS POST OPEN REDUCTION WITH INTERNAL FIXATION (ORIF) OF FRACTURE OF ANKLE: Primary | ICD-10-CM

## 2017-07-14 DIAGNOSIS — Z98.890 STATUS POST OPEN REDUCTION WITH INTERNAL FIXATION (ORIF) OF FRACTURE OF ANKLE: Primary | ICD-10-CM

## 2017-07-14 PROCEDURE — 99024 POSTOP FOLLOW-UP VISIT: CPT | Performed by: NURSE PRACTITIONER

## 2017-07-14 NOTE — TELEPHONE ENCOUNTER
Has developed a nodule in her arch due to wearing the boot without an arch in it.  Do you have any recommendations for her as to what will help with this?  She just doesn't want it to get any worse.  She has an appointment with Dr. Beck on Thursday.

## 2017-07-14 NOTE — PROGRESS NOTES
CC: Status post Open reduction internal fixation right ankle, DOS 05/13/2017    HPI: Ms. Bob presents today with concerns regarding increased pain at plantar aspect right foot as well as presence of a knot at mid foot and numbness and tingling at first and second toe. Has noticed the symptoms increasing within the last one week and is unable to comfortably ambulate while in walking boot due to symptoms. She has continued with use of cane and believes that she should not require assistive device such as cane at this time and wants to assure there is not something else going on. Denies all other concerns at this time.    Exam:  Right foot/ankle: Lateral and medial incision sites well healed, negative for erythema, edema and drainage.  + sensation all aspects with exception of great toe  + pain with light palpation at all aspects plantar surface but greatest amount of pain at first digit and mid foot aspect  4+/5 strength   Palpable knot present at arch of foot  Brisk cap refill all digits, 2+ dorsalis pulse right foot    Impression: Status post open reduction internal fixation right ankle    Plan:  1. Will continue with use of walking boot until follow-up with Dr. Beck which is scheduled on 07/20/2017. Heel lift placed in walking boot to decrease pressure at plantar aspect right foot. Encouraged to apply heat and massage knot. Will start on low dose prednisone pack today. Provided with topical Pennsaid to apply twice daily with massage to right foot. Prescription sent to specialty pharmacy. After heel lifts placed, patient was able to ambulate out of clinic without use of assistive device. Encouraged to call with any other concerns.

## 2017-07-17 ENCOUNTER — HOSPITAL ENCOUNTER (OUTPATIENT)
Dept: PHYSICAL THERAPY | Facility: HOSPITAL | Age: 42
Setting detail: THERAPIES SERIES
Discharge: HOME | End: 2017-07-17

## 2017-07-17 DIAGNOSIS — S82.841A BIMALLEOLAR ANKLE FRACTURE, RIGHT, CLOSED, INITIAL ENCOUNTER: Primary | ICD-10-CM

## 2017-07-17 PROCEDURE — 97140 MANUAL THERAPY 1/> REGIONS: CPT

## 2017-07-17 PROCEDURE — 97110 THERAPEUTIC EXERCISES: CPT

## 2017-07-17 NOTE — THERAPY TREATMENT NOTE
Outpatient Physical Therapy Ortho Treatment Note   [place]     Patient Name: Elia Payne  : 1975  MRN: [id number]  Today's Date: 2017      Visit Date: 2017    Visit Dx:    ICD-10-CM ICD-9-CM   1. Bimalleolar ankle fracture, right, closed, initial encounter S82.841A 824.4       Patient Active Problem List   Diagnosis   • Abnormal weight gain   • Anxiety, generalized   • Inflamed seborrheic keratosis   • Acute maxillary sinusitis   • Ankle fracture   • Bimalleolar ankle fracture   • Status post open reduction with internal fixation (ORIF) of fracture of ankle, right 2017        Past Medical History:   Diagnosis Date   • Depression    • PONV (postoperative nausea and vomiting)         Past Surgical History:   Procedure Laterality Date   • ABDOMINAL SURGERY     • ANKLE OPEN REDUCTION INTERNAL FIXATION Right 2017    Procedure:  OPEN REDUCTION INTERNAL FIXATION right ankle fractures;  Surgeon: Mega Beck MD;  Location: [facility];  Service:    • ANKLE SURGERY      broken in    • BREAST SURGERY     • CHOLECYSTECTOMY     • COSMETIC SURGERY     • DILATATION AND CURETTAGE     • FRACTURE SURGERY                               PT Assessment/Plan       17 1000       PT Assessment    Assessment Comments Patient is ambulating without the use of her crutches and is ambulating using a straight cane.   -AS     PT Plan    PT Plan Comments Continue with current treatment plan.  -AS       User Key  (r) = Recorded By, (t) = Taken By, (c) = Cosigned By    Initials Name Provider Type    AS Alfredo Lopez, PT Physical Therapist                    Exercises       17 1000          Subjective Comments    Subjective Comments Patient states she has been diagnosed with plantar fasciitis. She states she has D/C use of her crutches and now ambulates with a cane the past few days.  -AS      Exercise 1    Exercise Name 1 NWB Gastroc/Soleus Stretch  -AS      Reps 1 15  -AS       Time (Seconds) 1 10 sec hold  -AS      Exercise 2    Exercise Name 2 ABC's  -AS      Reps 2 3   2x upper and 2x lower case  -AS      Exercise 3    Exercise Name 3 Pro Stretch  -AS      Time (Minutes) 3 5 min  -AS      Exercise 4    Exercise Name 4 Towel Curls  -AS      Time (Minutes) 4 5 min  -AS      Exercise 5    Exercise Name 5 Manual  -AS      Time (Minutes) 5 10 min  -AS      Exercise 6    Exercise Name 6 4-Way Rocker Board  -AS      Reps 6 Other   40  -AS      Exercise 7    Exercise Name 7 Big Toe Lifts  -AS      Reps 7 Other   50  -AS      Exercise 8    Exercise Name 8 DF/PF vs Band  -AS      Equipment 8 Theraband  -AS      Resistance 8 Blue  -AS      Reps 8 25  -AS      Exercise 9    Exercise Name 9 IV/EV vs band  -AS      Equipment 9 Theraband  -AS      Resistance 9 Yellow  -AS      Reps 9 25  -AS      Exercise 10    Exercise Name 10 DL Heel Raises  -AS      Reps 10 30  -AS      Exercise 11    Exercise Name 11 SL Stance  -AS      Sets 11 3  -AS      Time (Seconds) 11 30 sec each  -AS        User Key  (r) = Recorded By, (t) = Taken By, (c) = Cosigned By    Initials Name Provider Type    AS Alfredo Lopez, PT Physical Therapist                                       Time Calculation:   Start Time: 1004  Stop Time: 1106  Time Calculation (min): 62 min    Therapy Charges for Today     Code Description Service Date Service Provider Modifiers Qty    94186068658  PT THER PROC EA 15 MIN 7/17/2017 Alfredo Lopez, PT GP 1    [id number] HC PT MANUAL THERAPY EA 15 MIN 7/17/2017 Alfredo Lopez, PT GP 1                    Alfredo Lopez, PT  7/17/2017

## 2017-07-19 ENCOUNTER — HOSPITAL ENCOUNTER (OUTPATIENT)
Dept: PHYSICAL THERAPY | Facility: HOSPITAL | Age: 42
Setting detail: THERAPIES SERIES
Discharge: HOME | End: 2017-07-19

## 2017-07-19 DIAGNOSIS — S82.841A BIMALLEOLAR ANKLE FRACTURE, RIGHT, CLOSED, INITIAL ENCOUNTER: Primary | ICD-10-CM

## 2017-07-19 PROCEDURE — 97110 THERAPEUTIC EXERCISES: CPT

## 2017-07-19 PROCEDURE — 97140 MANUAL THERAPY 1/> REGIONS: CPT

## 2017-07-19 NOTE — THERAPY TREATMENT NOTE
Outpatient Physical Therapy Ortho Treatment Note   [place]     Patient Name: Elia Payne  : 1975  MRN: [id number]  Today's Date: 2017      Visit Date: 2017    Visit Dx:    ICD-10-CM ICD-9-CM   1. Bimalleolar ankle fracture, right, closed, initial encounter S82.841A 824.4       Patient Active Problem List   Diagnosis   • Abnormal weight gain   • Anxiety, generalized   • Inflamed seborrheic keratosis   • Acute maxillary sinusitis   • Ankle fracture   • Bimalleolar ankle fracture   • Status post open reduction with internal fixation (ORIF) of fracture of ankle, right 2017        Past Medical History:   Diagnosis Date   • Depression    • PONV (postoperative nausea and vomiting)         Past Surgical History:   Procedure Laterality Date   • ABDOMINAL SURGERY     • ANKLE OPEN REDUCTION INTERNAL FIXATION Right 2017    Procedure:  OPEN REDUCTION INTERNAL FIXATION right ankle fractures;  Surgeon: Mega Beck MD;  Location: [facility];  Service:    • ANKLE SURGERY      broken in    • BREAST SURGERY     • CHOLECYSTECTOMY     • COSMETIC SURGERY     • DILATATION AND CURETTAGE     • FRACTURE SURGERY                               PT Assessment/Plan       17 1000       PT Assessment    Assessment Comments Patient continues to ambulate with an improved gait pattern and is able to ambulate longer distances without the use of her AD.  -AS     PT Plan    PT Plan Comments Patient to see her MD next week. Will continue with PT as advised.  -AS       User Key  (r) = Recorded By, (t) = Taken By, (c) = Cosigned By    Initials Name Provider Type    AS Alfredo Lopez, PT Physical Therapist                    Exercises       17 1000          Subjective Comments    Subjective Comments Patient reports her foot and ankle is feeling better.  -AS      Exercise 1    Exercise Name 1 NWB Gastroc/Soleus Stretch  -AS      Reps 1 15  -AS      Time (Seconds) 1 10 sec hold  -AS       Exercise 2    Exercise Name 2 ABC's  -AS      Reps 2 3   2x upper and 2x lower case  -AS      Exercise 3    Exercise Name 3 Pro Stretch  -AS      Time (Minutes) 3 5 min  -AS      Exercise 4    Exercise Name 4 Towel Curls  -AS      Time (Minutes) 4 5 min  -AS      Exercise 5    Exercise Name 5 Manual  -AS      Time (Minutes) 5 10 min  -AS      Exercise 6    Exercise Name 6 4-Way Rocker Board  -AS      Reps 6 Other   40  -AS      Exercise 7    Exercise Name 7 Big Toe Lifts  -AS      Reps 7 Other   50  -AS      Exercise 8    Exercise Name 8 DF/PF vs Band  -AS      Equipment 8 Theraband  -AS      Resistance 8 Blue  -AS      Reps 8 25  -AS      Exercise 9    Exercise Name 9 IV/EV vs band  -AS      Equipment 9 Theraband  -AS      Resistance 9 Yellow  -AS      Reps 9 25  -AS      Exercise 10    Exercise Name 10 DL Heel Raises  -AS      Reps 10 30  -AS      Exercise 11    Exercise Name 11 SL Stance  -AS      Sets 11 3  -AS      Time (Seconds) 11 30 sec each  -AS        User Key  (r) = Recorded By, (t) = Taken By, (c) = Cosigned By    Initials Name Provider Type    AS Alfredo Lopez, PT Physical Therapist                                       Time Calculation:   Start Time: 0959  Stop Time: 1100  Time Calculation (min): 61 min    Therapy Charges for Today     Code Description Service Date Service Provider Modifiers Qty    [id number]  PT THER PROC EA 15 MIN 7/19/2017 Alfredo Lopez, PT GP 1    [id number]  PT MANUAL THERAPY EA 15 MIN 7/19/2017 Alfredo Lopez, PT GP 1                    Alfredo Lopez, PT  7/19/2017

## 2017-07-20 ENCOUNTER — OFFICE VISIT (OUTPATIENT)
Dept: ORTHOPEDIC SURGERY | Facility: CLINIC | Age: 42
End: 2017-07-20

## 2017-07-20 VITALS — HEIGHT: 65 IN | BODY MASS INDEX: 24.16 KG/M2 | WEIGHT: 145 LBS

## 2017-07-20 DIAGNOSIS — M72.2 PLANTAR FASCIITIS OF RIGHT FOOT: ICD-10-CM

## 2017-07-20 DIAGNOSIS — Z98.890 STATUS POST OPEN REDUCTION WITH INTERNAL FIXATION (ORIF) OF FRACTURE OF ANKLE: ICD-10-CM

## 2017-07-20 DIAGNOSIS — R52 PAIN: Primary | ICD-10-CM

## 2017-07-20 DIAGNOSIS — Z87.81 STATUS POST OPEN REDUCTION WITH INTERNAL FIXATION (ORIF) OF FRACTURE OF ANKLE: ICD-10-CM

## 2017-07-20 DIAGNOSIS — S82.891A ANKLE FRACTURE, RIGHT, CLOSED, INITIAL ENCOUNTER: ICD-10-CM

## 2017-07-20 PROCEDURE — 73600 X-RAY EXAM OF ANKLE: CPT | Performed by: ORTHOPAEDIC SURGERY

## 2017-07-20 PROCEDURE — 99212 OFFICE O/P EST SF 10 MIN: CPT | Performed by: ORTHOPAEDIC SURGERY

## 2017-07-20 NOTE — PROGRESS NOTES
Subjective: Status post ORIF right bimalleolar ankle fracture, foot plantar fasciitis     Patient ID: Elia Payne is a 41 y.o. female.    Chief Complaint:    History of Present Illness patient is to the injury to the ankle the standpoint of the ankle she is doing quite well the plantar fasciitis is developed is causing her significant pain and discomfort.  When seen by Amie Peña last week was given an x-ray for her fracture boot which is helped still having moderate pain and discomfort.  The ankle itself is doing quite well.  She has been on naproxen 500 twice a day.       Social History     Occupational History   • Not on file.     Social History Main Topics   • Smoking status: Never Smoker   • Smokeless tobacco: Never Used   • Alcohol use Yes      Comment: socially   • Drug use: No   • Sexual activity: Defer      Review of Systems   Constitutional: Negative for chills, diaphoresis, fever and unexpected weight change.   HENT: Negative for hearing loss, nosebleeds, sore throat and tinnitus.    Eyes: Negative for pain and visual disturbance.   Respiratory: Negative for cough, shortness of breath and wheezing.    Cardiovascular: Negative for chest pain and palpitations.   Gastrointestinal: Negative for abdominal pain, diarrhea, nausea and vomiting.   Endocrine: Negative for cold intolerance, heat intolerance and polydipsia.   Genitourinary: Negative for difficulty urinating, dysuria and hematuria.   Musculoskeletal: Positive for myalgias. Negative for arthralgias and joint swelling.   Skin: Negative for rash and wound.   Allergic/Immunologic: Negative for environmental allergies.   Neurological: Negative for dizziness, syncope and numbness.   Hematological: Does not bruise/bleed easily.   Psychiatric/Behavioral: Negative for dysphoric mood and sleep disturbance. The patient is not nervous/anxious.          Past Medical History:   Diagnosis Date   • Depression    • PONV (postoperative nausea and vomiting)       Past Surgical History:   Procedure Laterality Date   • ABDOMINAL SURGERY     • ANKLE OPEN REDUCTION INTERNAL FIXATION Right 5/13/2017    Procedure:  OPEN REDUCTION INTERNAL FIXATION right ankle fractures;  Surgeon: Mega Beck MD;  Location: [facility];  Service:    • ANKLE SURGERY      broken in 1994   • BREAST SURGERY     • CHOLECYSTECTOMY     • COSMETIC SURGERY     • DILATATION AND CURETTAGE     • FRACTURE SURGERY       History reviewed. No pertinent family history.      Objective:  There were no vitals filed for this visit.  Last 3 weights    07/20/17  1101   Weight: 145 lb (65.8 kg)     Body mass index is 24.13 kg/(m^2).       Ortho Exam  AP lateral oblique view of the right ankle shows consolidation at the fracture site compared to previous x-rays with anatomical alignment.  On exam she is alert and oriented ×3.  Minimal swelling noted to the foot and ankle.  His no erythema the wound completely benign.  There is minimal tenderness over the fracture sites.  Her calf is nontender negative Homans.  The biggest problem is her Achilles tendinitis she may be developing a mild metatarsalgia she has pain with compression of her metatarsal heads.  His tenderness over the plantar fascial Achilles to.  The skin remains cool to touch.    Assessment:       1. Pain    2. Status post open reduction with internal fixation (ORIF) of fracture of ankle, right 05/13/2017    3. Ankle fracture, right, closed, initial encounter    4. Plantar fasciitis of right foot          Plan:      undertaker out of the fracture boot and try to put her in an active ankle brace with the protuberance pressure at the incision sites are totally get a soft ankle sleeve for a soft compression time sleeve over the ankle.  She get that a [employer] or at [facility] orthopedics apply store.  I'm also going to make important for her to see Dr. Yeung regarding the plantar fasciitis of metatarsalgia.  Continue the Naprosyn 500 twice a day and she can  supplement pain with Tylenol.  Return to see me in 3 weeks no x-rays necessary.      Work Status:    RIKKI query complete.    Orders:  Orders Placed This Encounter   Procedures   • XR Ankle 2 View Right       Medications:  No orders of the defined types were placed in this encounter.      Followup:  Return in about 3 weeks (around 8/10/2017).          Dragon transcription disclaimer     Much of this encounter note is an electronic transcription/translation of spoken language to printed text. The electronic translation of spoken language may permit erroneous, or at times, nonsensical words or phrases to be inadvertently transcribed. Although I have reviewed the note for such errors, some may still exist.

## 2017-07-27 ENCOUNTER — TELEPHONE (OUTPATIENT)
Dept: ORTHOPEDIC SURGERY | Facility: CLINIC | Age: 42
End: 2017-07-27

## 2017-07-27 NOTE — TELEPHONE ENCOUNTER
Having a lot of problem with her circulation with her foot.  Feels like she has rubber bands on all of her toes.  Her foot is swollen. The color of her foot is reddish and the foot is hotter than the other foot.   Is this normal?  What should she do for it?

## 2017-07-27 NOTE — TELEPHONE ENCOUNTER
She needs to keep the foot and ankle elevated above the level of her heart.  She's having pain when she puts weight on it she needs to back out the amount of weight she is putting on the ankle.  if no better by Monday she needs to be seen

## 2017-07-28 ENCOUNTER — APPOINTMENT (OUTPATIENT)
Dept: PHYSICAL THERAPY | Facility: HOSPITAL | Age: 42
End: 2017-07-28

## 2017-07-31 DIAGNOSIS — M77.41 METATARSALGIA OF RIGHT FOOT: Primary | ICD-10-CM

## 2017-08-07 ENCOUNTER — HOSPITAL ENCOUNTER (OUTPATIENT)
Dept: PHYSICAL THERAPY | Facility: HOSPITAL | Age: 42
Setting detail: THERAPIES SERIES
Discharge: HOME | End: 2017-08-07

## 2017-08-07 DIAGNOSIS — S82.841A BIMALLEOLAR ANKLE FRACTURE, RIGHT, CLOSED, INITIAL ENCOUNTER: Primary | ICD-10-CM

## 2017-08-07 PROCEDURE — 97110 THERAPEUTIC EXERCISES: CPT

## 2017-08-07 PROCEDURE — 97140 MANUAL THERAPY 1/> REGIONS: CPT

## 2017-08-07 NOTE — THERAPY RE-EVALUATION
Outpatient Physical Therapy Ortho Re-Evaluation   [place]     Patient Name: Elia Payne  : 1975  MRN: [id number]  Today's Date: 2017      Visit Date: 2017    Patient Active Problem List   Diagnosis   • Abnormal weight gain   • Anxiety, generalized   • Inflamed seborrheic keratosis   • Acute maxillary sinusitis   • Ankle fracture   • Bimalleolar ankle fracture   • Status post open reduction with internal fixation (ORIF) of fracture of ankle, right 2017        Past Medical History:   Diagnosis Date   • Depression    • PONV (postoperative nausea and vomiting)         Past Surgical History:   Procedure Laterality Date   • ABDOMINAL SURGERY     • ANKLE OPEN REDUCTION INTERNAL FIXATION Right 2017    Procedure:  OPEN REDUCTION INTERNAL FIXATION right ankle fractures;  Surgeon: Mega Beck MD;  Location: [facility];  Service:    • ANKLE SURGERY      broken in    • BREAST SURGERY     • CHOLECYSTECTOMY     • COSMETIC SURGERY     • DILATATION AND CURETTAGE     • FRACTURE SURGERY         Visit Dx:     ICD-10-CM ICD-9-CM   1. Bimalleolar ankle fracture, right, closed, initial encounter S82.841A 824.4                 PT Ortho       17 1100    Right Ankle    Dorsiflexion AROM Deficit 1  -AS    Plantarflexion AROM Deficit WNL  -AS    Inversion AROM Deficit 11  -AS    Eversion AROM Deficit 6  -AS    Right Hip    Hip Flexion Gross Movement (5/5) normal  -AS    Hip Extension Gross Movement (5/5) normal  -AS    Hip ABduction Gross Movement (5/5) normal  -AS    Left Knee    Knee Extension Gross Movement (5/5) normal  -AS    Knee Flexion Gross Movement (5/5) normal  -AS    Right Ankle/Foot    Ankle PF Gross Movement (4/5) good  -AS    Ankle Dorsiflexion Gross Movement (4-/5) good minus  -AS    Subtalar Inversion Gross Movement (4-/5) good minus  -AS    Subtalar Eversion Gross Movement (4-/5) good minus  -AS      User Key  (r) = Recorded By, (t) = Taken By, (c) = Cosigned By     Initials Name Provider Type    AS Alfredo Lopez, PT Physical Therapist                                    PT Assessment/Plan       08/07/17 1100       PT Assessment    Functional Limitations Impaired gait;Impaired locomotion;Limitation in home management;Limitations in community activities;Performance in leisure activities;Performance in self-care ADL;Performance in sport activities;Performance in work activities  -AS     Impairments Gait;Impaired flexibility;Joint mobility;Muscle strength;Pain;Range of motion  -AS     Assessment Comments Patient continues to do well with PT with improved right foot/ankle and toe mobility. Patient continues to report improved pain with improved function. Patient continues to wean herself from crutches at this time. She ambulates with an antalgic gait pattern with decreased heel strike and toe off on right side. Patient is wearing an ankle brace on right ankle with a regular tennis shoe.  -AS     Please refer to paper survey for additional self-reported information Yes  -AS     Rehab Potential Good  -AS     Patient/caregiver participated in establishment of treatment plan and goals Yes  -AS     Patient would benefit from skilled therapy intervention Yes  -AS     PT Plan    PT Frequency 2x/week  -AS     Predicted Duration of Therapy Intervention (days/wks) 6-8 weeks  -AS     Planned CPT's? PT RE-EVAL: 75893;PT THER PROC EA 15 MIN: 37714;PT THER ACT EA 15 MIN: 34493;PT MANUAL THERAPY EA 15 MIN: 67832;PT NEUROMUSC RE-EDUCATION EA 15 MIN: 27121;PT GAIT TRAINING EA 15 MIN: 73577;PT HOT OR COLD PACK TREAT MCARE;PT ELECTRICAL STIM UNATTEND: ;PT ULTRASOUND EA 15 MIN: 33558  -AS     PT Plan Comments Continue with current treatment plan.  -AS       User Key  (r) = Recorded By, (t) = Taken By, (c) = Cosigned By    Initials Name Provider Type    AS Alfredo Lopez, PT Physical Therapist                  Exercises       08/07/17 1100          Subjective Comments    Subjective  Comments Patient states she has been doing very well lately.  -AS      Exercise 1    Exercise Name 1 NWB Gastroc/Soleus Stretch  -AS      Reps 1 15  -AS      Time (Seconds) 1 10 sec hold  -AS      Exercise 2    Exercise Name 2 ABC's  -AS      Reps 2 3   2x upper and 2x lower case  -AS      Exercise 3    Exercise Name 3 Pro Stretch  -AS      Time (Minutes) 3 5 min  -AS      Exercise 4    Exercise Name 4 Towel Curls  -AS      Time (Minutes) 4 5 min  -AS      Exercise 5    Exercise Name 5 Manual  -AS      Time (Minutes) 5 10 min  -AS      Exercise 6    Exercise Name 6 4-Way Rocker Board  -AS      Reps 6 Other   50  -AS      Exercise 7    Exercise Name 7 Big Toe Lifts  -AS      Reps 7 Other   50  -AS      Exercise 8    Exercise Name 8 DF/PF vs Band  -AS      Reps 8 25  -AS      Equipment 8 Theraband  -AS      Resistance 8 Blue  -AS      Exercise 9    Exercise Name 9 IV/EV vs band  -AS      Reps 9 25  -AS      Equipment 9 Theraband  -AS      Resistance 9 Yellow  -AS      Exercise 10    Exercise Name 10 DL Heel Raises  -AS      Reps 10 Other   50  -AS      Exercise 11    Exercise Name 11 SL Stance  -AS      Sets 11 3  -AS      Time (Seconds) 11 30 sec each  -AS      Exercise 12    Exercise Name 12 SL Heel Raises  -AS      Reps 12 30  -AS        User Key  (r) = Recorded By, (t) = Taken By, (c) = Cosigned By    Initials Name Provider Type    AS Alfredo Lopez, PT Physical Therapist                              Time Calculation:   Start Time: 0906  Stop Time: 0955  Time Calculation (min): 49 min     Therapy Charges for Today     Code Description Service Date Service Provider Modifiers Qty    20548877272  PT THER PROC EA 15 MIN 8/7/2017 Alfredo Lopez, PT GP 1    [id number]  PT MANUAL THERAPY EA 15 MIN 8/7/2017 Alfredo Lopez, PT GP 1                    Alfredo Lopez, PT  8/7/2017

## 2017-08-08 ENCOUNTER — HOSPITAL ENCOUNTER (OUTPATIENT)
Dept: GENERAL RADIOLOGY | Facility: HOSPITAL | Age: 42
Discharge: HOME | End: 2017-08-08
Attending: PODIATRIST | Admitting: PODIATRIST

## 2017-08-08 PROCEDURE — 73630 X-RAY EXAM OF FOOT: CPT

## 2017-08-09 ENCOUNTER — HOSPITAL ENCOUNTER (OUTPATIENT)
Dept: PHYSICAL THERAPY | Facility: HOSPITAL | Age: 42
Setting detail: THERAPIES SERIES
Discharge: HOME | End: 2017-08-09

## 2017-08-09 ENCOUNTER — OFFICE VISIT (OUTPATIENT)
Dept: INTERNAL MEDICINE | Facility: CLINIC | Age: 42
End: 2017-08-09

## 2017-08-09 VITALS
HEART RATE: 90 BPM | OXYGEN SATURATION: 97 % | DIASTOLIC BLOOD PRESSURE: 60 MMHG | BODY MASS INDEX: 24.49 KG/M2 | WEIGHT: 147 LBS | SYSTOLIC BLOOD PRESSURE: 104 MMHG | HEIGHT: 65 IN

## 2017-08-09 DIAGNOSIS — F41.9 ANXIETY: ICD-10-CM

## 2017-08-09 DIAGNOSIS — Z30.011 ENCOUNTER FOR INITIAL PRESCRIPTION OF CONTRACEPTIVE PILLS: ICD-10-CM

## 2017-08-09 DIAGNOSIS — Z00.00 HEALTHCARE MAINTENANCE: Primary | ICD-10-CM

## 2017-08-09 DIAGNOSIS — S82.841A BIMALLEOLAR ANKLE FRACTURE, RIGHT, CLOSED, INITIAL ENCOUNTER: Primary | ICD-10-CM

## 2017-08-09 PROCEDURE — 99396 PREV VISIT EST AGE 40-64: CPT | Performed by: NURSE PRACTITIONER

## 2017-08-09 PROCEDURE — 97110 THERAPEUTIC EXERCISES: CPT

## 2017-08-09 PROCEDURE — 97140 MANUAL THERAPY 1/> REGIONS: CPT

## 2017-08-09 RX ORDER — ALPRAZOLAM 0.5 MG/1
0.5 TABLET ORAL 2 TIMES DAILY PRN
Qty: 4 TABLET | Refills: 0 | Status: SHIPPED | OUTPATIENT
Start: 2017-08-09 | End: 2017-10-23

## 2017-08-09 NOTE — PROGRESS NOTES
check up    Subjective     Elia Payne is a 41 y.o. female being seen for a follow up appointment today regarding right ankle pain and health maintenance exam. She broke her ankle 6 months ago stepping off the curve. She had surgery 5-2017 and she is in an ankle brace. She has her last pap 1.5 years ago. She has a Mammogram schedule and pap. She does have a Mirena. She is otherwise feeling well. She has never had a c-scope. Medical history reviewed.       History of Present Illness     No Known Allergies      Current Outpatient Prescriptions:   •  PredniSONE 5 MG tablet therapy pack dosepak, Take 1 tablet by mouth Take As Directed. Take as directed on package instructions., Disp: 21 tablet, Rfl: 0  •  Diclofenac Sodium (PENNSAID) 2 % solution, Place 1 application on the skin 2 (Two) Times a Day As Needed (pain)., Disp: 112 g, Rfl: 3    The following portions of the patient's history were reviewed and updated as appropriate: allergies, current medications, past family history, past medical history, past social history, past surgical history and problem list.    Review of Systems   Constitutional: Negative.    HENT: Negative.    Eyes: Negative.    Respiratory: Negative.    Cardiovascular: Positive for leg swelling. Negative for chest pain.   Gastrointestinal: Negative.  Negative for abdominal distention and abdominal pain.   Endocrine: Negative.    Musculoskeletal: Positive for arthralgias.   Allergic/Immunologic: Negative.    Neurological: Negative.    Hematological: Negative.    Psychiatric/Behavioral: Negative.        Assessment     Physical Exam   Constitutional: She is oriented to person, place, and time. She appears well-developed and well-nourished.   HENT:   Head: Normocephalic.   Right Ear: External ear normal.   Left Ear: External ear normal.   Mouth/Throat: Oropharynx is clear and moist.   Eyes: Pupils are equal, round, and reactive to light.   Neck: Neck supple. No thyromegaly present.    Cardiovascular: Normal rate, regular rhythm, normal heart sounds and intact distal pulses.    No murmur heard.  Pulmonary/Chest: Effort normal and breath sounds normal. No respiratory distress. She has no wheezes.   Abdominal: Soft. Bowel sounds are normal. She exhibits no distension. There is no tenderness.   Musculoskeletal: She exhibits edema (right ankle).   Neurological: She is alert and oriented to person, place, and time. No cranial nerve deficit.   Skin: Skin is warm and dry.   Psychiatric: She has a normal mood and affect. Her behavior is normal. Judgment and thought content normal.   Vitals reviewed.      Plan     Her fasting labs were reviewed with the patient from last week.     Diagnoses and all orders for this visit:    Healthcare maintenance  -     Comprehensive metabolic panel; Future  -     Conv Lipid Panel w/ Chol/HDL Ratio; Future  -     T4 & TSH ([facility]); Future  -     CBC & Differential; Future    Encounter for initial prescription of contraceptive pills  -     levonorgestrel (MIRENA, 52 MG,) 20 MCG/24HR IUD; 1 each by Intrauterine route 1 (One) Time for 1 dose.    Anxiety  -     ALPRAZolam (XANAX) 0.5 MG tablet; Take 1 tablet by mouth 2 (Two) Times a Day As Needed (flight anxiety).

## 2017-08-09 NOTE — THERAPY TREATMENT NOTE
Outpatient Physical Therapy Ortho Treatment Note  [facility]     Patient Name: Elia Payne  : 1975  MRN: [id number]  Today's Date: 2017      Visit Date: 2017    Visit Dx:    ICD-10-CM ICD-9-CM   1. Bimalleolar ankle fracture, right, closed, initial encounter S82.841A 824.4       Patient Active Problem List   Diagnosis   • Abnormal weight gain   • Anxiety, generalized   • Inflamed seborrheic keratosis   • Acute maxillary sinusitis   • Ankle fracture   • Bimalleolar ankle fracture   • Status post open reduction with internal fixation (ORIF) of fracture of ankle, right 2017        Past Medical History:   Diagnosis Date   • Depression    • PONV (postoperative nausea and vomiting)         Past Surgical History:   Procedure Laterality Date   • ABDOMINAL SURGERY     • ANKLE OPEN REDUCTION INTERNAL FIXATION Right 2017    Procedure:  OPEN REDUCTION INTERNAL FIXATION right ankle fractures;  Surgeon: Mega Beck MD;  Location: [facility];  Service:    • ANKLE SURGERY      broken in    • BREAST SURGERY     • CHOLECYSTECTOMY     • COSMETIC SURGERY     • DILATATION AND CURETTAGE     • FRACTURE SURGERY               PT Ortho       17 1100    Right Ankle    Dorsiflexion AROM Deficit 1  -AS    Plantarflexion AROM Deficit WNL  -AS    Inversion AROM Deficit 11  -AS    Eversion AROM Deficit 6  -AS    Right Hip    Hip Flexion Gross Movement (5/5) normal  -AS    Hip Extension Gross Movement (5/5) normal  -AS    Hip ABduction Gross Movement (5/5) normal  -AS    Left Knee    Knee Extension Gross Movement (5/5) normal  -AS    Knee Flexion Gross Movement (5/5) normal  -AS    Right Ankle/Foot    Ankle PF Gross Movement (4/5) good  -AS    Ankle Dorsiflexion Gross Movement (4-/5) good minus  -AS    Subtalar Inversion Gross Movement (4-/5) good minus  -AS    Subtalar Eversion Gross Movement (4-/5) good minus  -AS      User Key  (r) = Recorded By, (t) = Taken By, (c) = Cosigned By     Initials Name Provider Type    AS Alfredo Lopez, PT Physical Therapist                            PT Assessment/Plan       08/09/17 0800       PT Assessment    Assessment Comments Patient continues to do well with PT with improved right ankle ROM and mobility.  -AS     PT Plan    PT Plan Comments Continue with current treatment plan.  -AS       User Key  (r) = Recorded By, (t) = Taken By, (c) = Cosigned By    Initials Name Provider Type    AS Alfredo Lopez, PT Physical Therapist                    Exercises       08/09/17 0800          Subjective Comments    Subjective Comments Patient reports she was able to walk around [employer] and [employer] yesterday. She states she walks slow but she is able to do it.  -AS      Exercise 1    Exercise Name 1 NWB Gastroc/Soleus Stretch  -AS      Reps 1 15  -AS      Time (Seconds) 1 10 sec hold  -AS      Exercise 2    Exercise Name 2 ABC's  -AS      Reps 2 3   2x upper and 2x lower case  -AS      Exercise 3    Exercise Name 3 Pro Stretch  -AS      Time (Minutes) 3 5 min  -AS      Exercise 4    Exercise Name 4 Towel Curls  -AS      Time (Minutes) 4 5 min  -AS      Exercise 5    Exercise Name 5 Manual  -AS      Time (Minutes) 5 10 min  -AS      Exercise 6    Exercise Name 6 4-Way Rocker Board  -AS      Reps 6 Other   50  -AS      Exercise 7    Exercise Name 7 Big Toe Lifts  -AS      Reps 7 Other   50  -AS      Exercise 8    Exercise Name 8 DF/PF vs Band  -AS      Reps 8 25  -AS      Equipment 8 Theraband  -AS      Resistance 8 Blue  -AS      Exercise 9    Exercise Name 9 IV/EV vs band  -AS      Reps 9 25  -AS      Equipment 9 Theraband  -AS      Resistance 9 Yellow  -AS      Exercise 10    Exercise Name 10 DL Heel Raises  -AS      Reps 10 Other   50  -AS      Exercise 11    Exercise Name 11 SL Stance  -AS      Sets 11 3  -AS      Time (Minutes) 11 1 min each  -AS      Time (Seconds) 11 --  -AS      Exercise 12    Exercise Name 12 SL Heel Raises  -AS      Reps 12 30  -AS         User Key  (r) = Recorded By, (t) = Taken By, (c) = Cosigned By    Initials Name Provider Type    AS Alfredo Lopez, PT Physical Therapist                                       Time Calculation:   Start Time: 0755  Stop Time: 0844  Time Calculation (min): 49 min    Therapy Charges for Today     Code Description Service Date Service Provider Modifiers Qty    [id number]  PT THER PROC EA 15 MIN 8/9/2017 Alfredo Lopez, PT GP 1    [id number]  PT MANUAL THERAPY EA 15 MIN 8/9/2017 Alfredo Lopez, PT GP 1                    Alfredo Lopez, PT  8/9/2017

## 2017-08-10 ENCOUNTER — OFFICE VISIT (OUTPATIENT)
Dept: ORTHOPEDIC SURGERY | Facility: CLINIC | Age: 42
End: 2017-08-10

## 2017-08-10 DIAGNOSIS — Z87.81 STATUS POST OPEN REDUCTION WITH INTERNAL FIXATION (ORIF) OF FRACTURE OF ANKLE: ICD-10-CM

## 2017-08-10 DIAGNOSIS — R52 PAIN: Primary | ICD-10-CM

## 2017-08-10 DIAGNOSIS — Z98.890 STATUS POST OPEN REDUCTION WITH INTERNAL FIXATION (ORIF) OF FRACTURE OF ANKLE: ICD-10-CM

## 2017-08-10 PROCEDURE — 99024 POSTOP FOLLOW-UP VISIT: CPT | Performed by: ORTHOPAEDIC SURGERY

## 2017-08-10 NOTE — PROGRESS NOTES
Subjective: Status post ORIF right bimalleolar ankle fracture     Patient ID: Elia Payne is a 41 y.o. female.    Chief Complaint:    History of Present Illness patient is almost 1 was under extremely well ambulating independently with minimal pain and/or discomfort.  Has seen Dr. Yeung and is under treatment for the plantar fasciitis and that is improving.       Social History     Occupational History   • Not on file.     Social History Main Topics   • Smoking status: Never Smoker   • Smokeless tobacco: Never Used   • Alcohol use Yes      Comment: socially   • Drug use: No   • Sexual activity: Defer      Review of Systems   Constitutional: Negative for chills, diaphoresis, fever and unexpected weight change.   HENT: Negative for hearing loss, nosebleeds, sore throat and tinnitus.    Eyes: Negative for pain and visual disturbance.   Respiratory: Negative for cough, shortness of breath and wheezing.    Cardiovascular: Negative for chest pain and palpitations.   Gastrointestinal: Negative for abdominal pain, diarrhea, nausea and vomiting.   Endocrine: Negative for cold intolerance, heat intolerance and polydipsia.   Genitourinary: Negative for difficulty urinating, dysuria and hematuria.   Musculoskeletal: Positive for joint swelling and myalgias. Negative for arthralgias.   Skin: Negative for rash and wound.   Allergic/Immunologic: Negative for environmental allergies.   Neurological: Negative for dizziness, syncope and numbness.   Hematological: Does not bruise/bleed easily.   Psychiatric/Behavioral: Negative for dysphoric mood and sleep disturbance. The patient is not nervous/anxious.    All other systems reviewed and are negative.        Past Medical History:   Diagnosis Date   • Depression    • PONV (postoperative nausea and vomiting)      Past Surgical History:   Procedure Laterality Date   • ABDOMINAL SURGERY     • ANKLE OPEN REDUCTION INTERNAL FIXATION Right 5/13/2017    Procedure:  OPEN REDUCTION  INTERNAL FIXATION right ankle fractures;  Surgeon: Mega Beck MD;  Location: [facility];  Service:    • ANKLE SURGERY      broken in 1994   • BREAST SURGERY     • CHOLECYSTECTOMY     • COSMETIC SURGERY     • DILATATION AND CURETTAGE     • FRACTURE SURGERY  05/2017    right ankle     No family history on file.      Objective:  There were no vitals filed for this visit.  There were no vitals filed for this visit.  There is no height or weight on file to calculate BMI.       Ortho Exam  she is alert and oriented ×3.  All wounds are benign.  There is no swelling no erythema no motor deficit good distal pulses.  Skin is cool to touch.  She can dorsiflex to 90° plantarflexed about 120°.  It is slowly improving.    Assessment:       1. Pain    2. Status post open reduction with internal fixation (ORIF) of fracture of ankle, right 05/13/2017          Plan:      this point activities as tolerated.  She can discontinue the Aircast to the ankle.  Continue weightbearing as tolerated.  Return to see me on an as-needed basis      Work Status:    RIKKI query complete.    Orders:  No orders of the defined types were placed in this encounter.      Medications:  No orders of the defined types were placed in this encounter.      Followup:  Return if symptoms worsen or fail to improve.          Dragon transcription disclaimer     Much of this encounter note is an electronic transcription/translation of spoken language to printed text. The electronic translation of spoken language may permit erroneous, or at times, nonsensical words or phrases to be inadvertently transcribed. Although I have reviewed the note for such errors, some may still exist.

## 2017-08-11 ENCOUNTER — LAB (OUTPATIENT)
Dept: INTERNAL MEDICINE | Facility: CLINIC | Age: 42
End: 2017-08-11

## 2017-08-11 DIAGNOSIS — Z00.00 HEALTHCARE MAINTENANCE: ICD-10-CM

## 2017-08-11 LAB
ALBUMIN SERPL-MCNC: 4 G/DL (ref 3.5–5.2)
ALBUMIN/GLOB SERPL: 2.1 G/DL
ALP SERPL-CCNC: 79 U/L (ref 40–129)
ALT SERPL-CCNC: 35 U/L (ref 5–33)
AST SERPL-CCNC: 14 U/L (ref 5–32)
BASOPHILS # BLD AUTO: 0.02 10*3/MM3 (ref 0–0.2)
BASOPHILS NFR BLD AUTO: 0.3 % (ref 0–2)
BILIRUB SERPL-MCNC: 0.5 MG/DL (ref 0.2–1.2)
BUN SERPL-MCNC: 12 MG/DL (ref 6–20)
BUN/CREAT SERPL: 17.6 (ref 7–25)
CALCIUM SERPL-MCNC: 8.7 MG/DL (ref 8.6–10.5)
CHLORIDE SERPL-SCNC: 103 MMOL/L (ref 98–107)
CHOLEST SERPL-MCNC: 130 MG/DL (ref 0–200)
CHOLEST/HDLC SERPL: 1.81 {RATIO}
CO2 SERPL-SCNC: 29 MMOL/L (ref 22–29)
CREAT SERPL-MCNC: 0.68 MG/DL (ref 0.57–1)
EOSINOPHIL # BLD AUTO: 0.14 10*3/MM3 (ref 0.1–0.3)
EOSINOPHIL NFR BLD AUTO: 2.2 % (ref 0–4)
ERYTHROCYTE [DISTWIDTH] IN BLOOD BY AUTOMATED COUNT: 13.2 % (ref 11.5–14.5)
GLOBULIN SER CALC-MCNC: 1.9 GM/DL
GLUCOSE SERPL-MCNC: 84 MG/DL (ref 65–99)
HCT VFR BLD AUTO: 41.5 % (ref 37–47)
HDLC SERPL-MCNC: 72 MG/DL (ref 40–60)
HGB BLD-MCNC: 13.7 G/DL (ref 12–16)
IMM GRANULOCYTES # BLD: 0.02 10*3/MM3 (ref 0–0.03)
IMM GRANULOCYTES NFR BLD: 0.3 % (ref 0–0.5)
LDLC SERPL CALC-MCNC: 43 MG/DL (ref 0–100)
LYMPHOCYTES # BLD AUTO: 2.8 10*3/MM3 (ref 0.6–4.8)
LYMPHOCYTES NFR BLD AUTO: 43.1 % (ref 20–45)
MCH RBC QN AUTO: 29.1 PG (ref 27–31)
MCHC RBC AUTO-ENTMCNC: 33 G/DL (ref 31–37)
MCV RBC AUTO: 88.3 FL (ref 81–99)
MONOCYTES # BLD AUTO: 0.59 10*3/MM3 (ref 0–1)
MONOCYTES NFR BLD AUTO: 9.1 % (ref 3–8)
NEUTROPHILS # BLD AUTO: 2.92 10*3/MM3 (ref 1.5–8.3)
NEUTROPHILS NFR BLD AUTO: 45 % (ref 45–70)
NRBC BLD AUTO-RTO: 0 /100 WBC (ref 0–0)
PLATELET # BLD AUTO: 343 10*3/MM3 (ref 140–500)
POTASSIUM SERPL-SCNC: 4.2 MMOL/L (ref 3.5–5.2)
PROT SERPL-MCNC: 5.9 G/DL (ref 6–8.5)
RBC # BLD AUTO: 4.7 10*6/MM3 (ref 4.2–5.4)
SODIUM SERPL-SCNC: 140 MMOL/L (ref 136–145)
T4 SERPL-MCNC: 6.13 MCG/DL (ref 4.5–11.7)
TRIGL SERPL-MCNC: 74 MG/DL (ref 0–150)
TSH SERPL DL<=0.005 MIU/L-ACNC: 4.34 MIU/ML (ref 0.27–4.2)
VLDLC SERPL CALC-MCNC: 14.8 MG/DL (ref 7–27)
WBC # BLD AUTO: 6.49 10*3/MM3 (ref 4.8–10.8)

## 2017-08-14 ENCOUNTER — RESULTS ENCOUNTER (OUTPATIENT)
Dept: INTERNAL MEDICINE | Facility: CLINIC | Age: 42
End: 2017-08-14

## 2017-08-14 ENCOUNTER — TELEPHONE (OUTPATIENT)
Dept: INTERNAL MEDICINE | Facility: CLINIC | Age: 42
End: 2017-08-14

## 2017-08-14 DIAGNOSIS — Z00.00 HEALTHCARE MAINTENANCE: ICD-10-CM

## 2017-08-14 NOTE — TELEPHONE ENCOUNTER
Pt. Notified     ----- Message from TED Thompson sent at 8/14/2017  8:06 AM EDT -----  Her thyroid hormones are slightly low. She may be developing hypothyroidism. I want to repeat tsh and free t4 in 4 weeks.   ----- Message -----     From: Guy Reflab Results In     Sent: 8/11/2017   4:16 PM       To: TED Thompson

## 2017-08-15 ENCOUNTER — HOSPITAL ENCOUNTER (OUTPATIENT)
Dept: PHYSICAL THERAPY | Facility: HOSPITAL | Age: 42
Setting detail: THERAPIES SERIES
Discharge: HOME | End: 2017-08-15

## 2017-08-15 DIAGNOSIS — S82.841A BIMALLEOLAR ANKLE FRACTURE, RIGHT, CLOSED, INITIAL ENCOUNTER: Primary | ICD-10-CM

## 2017-08-15 PROCEDURE — 97110 THERAPEUTIC EXERCISES: CPT

## 2017-08-15 NOTE — THERAPY TREATMENT NOTE
Outpatient Physical Therapy Ortho Treatment Note   [place]     Patient Name: Elia Payne  : 1975  MRN: [id number]  Today's Date: 8/15/2017      Visit Date: 08/15/2017    Visit Dx:    ICD-10-CM ICD-9-CM   1. Bimalleolar ankle fracture, right, closed, initial encounter S82.841A 824.4       Patient Active Problem List   Diagnosis   • Abnormal weight gain   • Anxiety, generalized   • Inflamed seborrheic keratosis   • Acute maxillary sinusitis   • Ankle fracture   • Bimalleolar ankle fracture   • Status post open reduction with internal fixation (ORIF) of fracture of ankle, right 2017   • Healthcare maintenance        Past Medical History:   Diagnosis Date   • Depression    • PONV (postoperative nausea and vomiting)         Past Surgical History:   Procedure Laterality Date   • ABDOMINAL SURGERY     • ANKLE OPEN REDUCTION INTERNAL FIXATION Right 2017    Procedure:  OPEN REDUCTION INTERNAL FIXATION right ankle fractures;  Surgeon: Mega Beck MD;  Location: [facility];  Service:    • ANKLE SURGERY      broken in    • BREAST SURGERY     • CHOLECYSTECTOMY     • COSMETIC SURGERY     • DILATATION AND CURETTAGE     • FRACTURE SURGERY  2017    right ankle                             PT Assessment/Plan       08/15/17 0800       PT Assessment    Assessment Comments Patient continues to ambulate with an improved gait pattern as she has increased her heel strike on her left.  -AS     PT Plan    PT Plan Comments Continue with current treatment plan.  -AS       User Key  (r) = Recorded By, (t) = Taken By, (c) = Cosigned By    Initials Name Provider Type    AS Alfredo Lopez, PT Physical Therapist                    Exercises       08/15/17 0800          Subjective Comments    Subjective Comments Patient reports that her foot and ankle is feeling better. She states she feels she is able to move her ankle better when she is walking.  -AS      Exercise 1    Exercise Name 1 NWB  Gastroc/Soleus Stretch  -AS      Reps 1 15  -AS      Time (Seconds) 1 10 sec hold  -AS      Exercise 2    Exercise Name 2 ABC's  -AS      Reps 2 3   2x upper and 2x lower case  -AS      Exercise 3    Exercise Name 3 Pro Stretch  -AS      Time (Minutes) 3 3 min  -AS      Exercise 4    Exercise Name 4 Towel Curls  -AS      Time (Minutes) 4 3 min  -AS      Exercise 5    Exercise Name 5 Manual  -AS      Time (Minutes) 5 10 min  -AS      Exercise 6    Exercise Name 6 4-Way Rocker Board  -AS      Reps 6 Other   50  -AS      Exercise 7    Exercise Name 7 Big Toe Lifts  -AS      Reps 7 Other   50  -AS      Exercise 8    Exercise Name 8 DF/PF vs Band  -AS      Reps 8 25  -AS      Equipment 8 Theraband  -AS      Resistance 8 Black  -AS      Exercise 9    Exercise Name 9 IV/EV vs band  -AS      Reps 9 25  -AS      Equipment 9 Theraband  -AS      Resistance 9 Red  -AS      Exercise 10    Exercise Name 10 DL Heel Raises  -AS      Reps 10 Other   50  -AS      Exercise 11    Exercise Name 11 SL Stance  -AS      Sets 11 3  -AS      Time (Minutes) 11 1 min each  -AS      Exercise 12    Exercise Name 12 SL Heel Raises  -AS      Reps 12 30  -AS      Exercise 13    Exercise Name 13 FWD Step Ups  -AS      Reps 13 25  -AS      Additional Comments 6 inch step  -AS      Exercise 14    Exercise Name 14 Lateral Step Overs  -AS      Reps 14 25  -AS      Additional Comments 6 inch step  -AS        User Key  (r) = Recorded By, (t) = Taken By, (c) = Cosigned By    Initials Name Provider Type    AS Alfredo Lopez, PT Physical Therapist                                       Time Calculation:   Start Time: 0801  Stop Time: 0855  Time Calculation (min): 54 min    Therapy Charges for Today     Code Description Service Date Service Provider Modifiers Qty    86595643178  PT THER PROC EA 15 MIN 8/15/2017 Alfredo Lopez, PT GP 2                    Alfredo Lopez, PT  8/15/2017

## 2017-08-17 ENCOUNTER — HOSPITAL ENCOUNTER (OUTPATIENT)
Dept: PHYSICAL THERAPY | Facility: HOSPITAL | Age: 42
Setting detail: THERAPIES SERIES
Discharge: HOME | End: 2017-08-17

## 2017-08-17 DIAGNOSIS — S82.841A BIMALLEOLAR ANKLE FRACTURE, RIGHT, CLOSED, INITIAL ENCOUNTER: Primary | ICD-10-CM

## 2017-08-17 PROCEDURE — 97110 THERAPEUTIC EXERCISES: CPT

## 2017-08-17 NOTE — THERAPY RE-EVALUATION
Outpatient Physical Therapy Ortho Re-Evaluation   [place]     Patient Name: Elia Payne  : 1975  MRN: [id number]  Today's Date: 2017      Visit Date: 2017    Patient Active Problem List   Diagnosis   • Abnormal weight gain   • Anxiety, generalized   • Inflamed seborrheic keratosis   • Acute maxillary sinusitis   • Ankle fracture   • Bimalleolar ankle fracture   • Status post open reduction with internal fixation (ORIF) of fracture of ankle, right 2017   • Healthcare maintenance        Past Medical History:   Diagnosis Date   • Depression    • PONV (postoperative nausea and vomiting)         Past Surgical History:   Procedure Laterality Date   • ABDOMINAL SURGERY     • ANKLE OPEN REDUCTION INTERNAL FIXATION Right 2017    Procedure:  OPEN REDUCTION INTERNAL FIXATION right ankle fractures;  Surgeon: Mega Beck MD;  Location: [facility];  Service:    • ANKLE SURGERY      broken in    • BREAST SURGERY     • CHOLECYSTECTOMY     • COSMETIC SURGERY     • DILATATION AND CURETTAGE     • FRACTURE SURGERY  2017    right ankle       Visit Dx:     ICD-10-CM ICD-9-CM   1. Bimalleolar ankle fracture, right, closed, initial encounter S82.841A 824.4                 PT Ortho       17 1000    Right Ankle    Dorsiflexion AROM Deficit 2  -AS    Plantarflexion AROM Deficit WNL  -AS    Inversion AROM Deficit 11  -AS    Eversion AROM Deficit 6  -AS    Right Hip    Hip Flexion Gross Movement (5/5) normal  -AS    Hip Extension Gross Movement (5/5) normal  -AS    Hip ABduction Gross Movement (5/5) normal  -AS    Left Knee    Knee Extension Gross Movement (5/5) normal  -AS    Knee Flexion Gross Movement (5/5) normal  -AS    Right Ankle/Foot    Ankle PF Gross Movement (4/5) good  -AS    Ankle Dorsiflexion Gross Movement (4-/5) good minus  -AS    Subtalar Inversion Gross Movement (4-/5) good minus  -AS    Subtalar Eversion Gross Movement (4-/5) good minus  -AS      User Key  (r) =  Recorded By, (t) = Taken By, (c) = Cosigned By    Initials Name Provider Type    AS Alfredo Lopez, PT Physical Therapist                                PT OP Goals       08/17/17 1000       PT Short Term Goals    STG Date to Achieve 08/31/17  -AS     STG 1 Patient to be compliant with her initial HEP for flexibility, ROM , and strengthening.  -AS     STG 1 Progress Met  -AS     STG 2 Patient to improve her right ankle DF ROM to neutral.  -AS     STG 2 Progress Ongoing;Progressing  -AS     STG 3 Patient to report pain on VAS of 4-5/10 when performing ADL's and work related activities.  -AS     STG 3 Progress Met  -AS     STG 4 Patient to improve her right general ankle strength to 4-/5.  -AS     STG 4 Progress Ongoing;Progressing  -AS     STG 5 Patient to improve her right ankle IV/EV to within 5 degrees of her contralateral ankle.  -AS     STG 5 Progress Ongoing;Progressing  -AS     Long Term Goals    LTG Date to Achieve 09/14/17  -AS     LTG 1 Patient to be compliant with her advanced HEP for flexibility, ROM , and strengthening.  -AS     LTG 1 Progress Ongoing;Progressing  -AS     LTG 2 Patient to improve her right ankle DF ROM to 5-10 degrees.  -AS     LTG 2 Progress Ongoing;Progressing  -AS     LTG 3 Patient to ambulate with normal heel to toe gait pattern community distances without walking boot and without crutches.  -AS     LTG 3 Progress Ongoing;Progressing  -AS     LTG 4 Patient to report pain on VAS of 1-2/10 when performing ADL's and work related activities.  -AS     LTG 4 Progress Met  -AS     LTG 5 Patient to improve her right general ankle strength to 4/5.  -AS     LTG 6 Patient to improve her right ankle IV/EV to WNL.  -AS     LTG 6 Progress Ongoing;Progressing  -AS     Time Calculation    PT Goal Re-Cert Due Date 09/14/17  -AS       User Key  (r) = Recorded By, (t) = Taken By, (c) = Cosigned By    Initials Name Provider Type    AS Alfredo Lopez, PT Physical Therapist                PT  Assessment/Plan       08/17/17 1000       PT Assessment    Functional Limitations Impaired gait;Impaired locomotion;Limitation in home management;Limitations in community activities;Performance in self-care ADL;Performance in leisure activities;Performance in sport activities;Performance in work activities  -AS     Impairments Gait;Impaired flexibility;Joint mobility;Muscle strength;Pain;Range of motion  -AS     Assessment Comments Patient continues to ambulate with an improved gait pattern as she has increased her heel strike on her left.  -AS     Please refer to paper survey for additional self-reported information Yes  -AS     Rehab Potential Good  -AS     Patient/caregiver participated in establishment of treatment plan and goals Yes  -AS     Patient would benefit from skilled therapy intervention Yes  -AS     PT Plan    PT Frequency 2x/week  -AS     Predicted Duration of Therapy Intervention (days/wks) 4-6 weeks  -AS     Planned CPT's? PT RE-EVAL: 56645;PT THER PROC EA 15 MIN: 56908;PT THER ACT EA 15 MIN: 20912;PT MANUAL THERAPY EA 15 MIN: 56814;PT NEUROMUSC RE-EDUCATION EA 15 MIN: 21337;PT HOT OR COLD PACK TREAT MC[facility];PT ELECTRICAL STIM UNATTEND: ;PT ULTRASOUND EA 15 MIN: 81441  -AS       User Key  (r) = Recorded By, (t) = Taken By, (c) = Cosigned By    Initials Name Provider Type    AS Alfredo Lopez, PT Physical Therapist                  Exercises       08/17/17 1000          Subjective Comments    Subjective Comments Patient states she feels she is getting better a little more each day.  -AS      Exercise 1    Exercise Name 1 NWB Gastroc/Soleus Stretch  -AS      Reps 1 15  -AS      Time (Seconds) 1 10 sec hold  -AS      Exercise 2    Exercise Name 2 ABC's  -AS      Reps 2 3   2x upper and 2x lower case  -AS      Exercise 3    Exercise Name 3 Pro Stretch  -AS      Time (Minutes) 3 3 min  -AS      Exercise 4    Exercise Name 4 Towel Curls  -AS      Time (Minutes) 4 3 min  -AS      Exercise 5     Exercise Name 5 Manual  -AS      Time (Minutes) 5 10 min  -AS      Exercise 6    Exercise Name 6 4-Way Rocker Board  -AS      Reps 6 Other   50  -AS      Exercise 7    Exercise Name 7 Big Toe Lifts  -AS      Reps 7 Other   50  -AS      Exercise 8    Exercise Name 8 DF/PF vs Band  -AS      Reps 8 25  -AS      Equipment 8 Theraband  -AS      Resistance 8 Black  -AS      Exercise 9    Exercise Name 9 IV/EV vs band  -AS      Reps 9 25  -AS      Equipment 9 Theraband  -AS      Resistance 9 Red  -AS      Exercise 10    Exercise Name 10 DL Heel Raises  -AS      Reps 10 Other   50  -AS      Exercise 11    Exercise Name 11 SL Stance  -AS      Sets 11 3  -AS      Time (Minutes) 11 1 min each  -AS      Exercise 12    Exercise Name 12 SL Heel Raises  -AS      Reps 12 30  -AS      Exercise 13    Exercise Name 13 FWD Step Ups  -AS      Reps 13 25  -AS      Exercise 14    Exercise Name 14 Lateral Step Overs  -AS      Reps 14 25  -AS        User Key  (r) = Recorded By, (t) = Taken By, (c) = Cosigned By    Initials Name Provider Type    AS Alfredo Lopez, PT Physical Therapist                              Time Calculation:   Start Time: 0800  Stop Time: 0848  Time Calculation (min): 48 min     Therapy Charges for Today     Code Description Service Date Service Provider Modifiers Qty    82166352643 HC PT THER PROC EA 15 MIN 8/17/2017 Alfredo Lopez, PT GP 2                    Alfredo Lopez, PT  8/17/2017

## 2017-08-23 ENCOUNTER — HOSPITAL ENCOUNTER (OUTPATIENT)
Dept: PHYSICAL THERAPY | Facility: HOSPITAL | Age: 42
Setting detail: THERAPIES SERIES
Discharge: HOME | End: 2017-08-23

## 2017-08-23 DIAGNOSIS — S82.841A BIMALLEOLAR ANKLE FRACTURE, RIGHT, CLOSED, INITIAL ENCOUNTER: Primary | ICD-10-CM

## 2017-08-23 PROCEDURE — 97110 THERAPEUTIC EXERCISES: CPT

## 2017-08-23 PROCEDURE — 97140 MANUAL THERAPY 1/> REGIONS: CPT

## 2017-08-23 NOTE — THERAPY TREATMENT NOTE
Outpatient Physical Therapy Ortho Treatment Note   [place]     Patient Name: Elia Payne  : 1975  MRN: [id number]  Today's Date: 2017      Visit Date: 2017    Visit Dx:    ICD-10-CM ICD-9-CM   1. Bimalleolar ankle fracture, right, closed, initial encounter S82.841A 824.4       Patient Active Problem List   Diagnosis   • Abnormal weight gain   • Anxiety, generalized   • Inflamed seborrheic keratosis   • Acute maxillary sinusitis   • Ankle fracture   • Bimalleolar ankle fracture   • Status post open reduction with internal fixation (ORIF) of fracture of ankle, right 2017   • Healthcare maintenance        Past Medical History:   Diagnosis Date   • Depression    • PONV (postoperative nausea and vomiting)         Past Surgical History:   Procedure Laterality Date   • ABDOMINAL SURGERY     • ANKLE OPEN REDUCTION INTERNAL FIXATION Right 2017    Procedure:  OPEN REDUCTION INTERNAL FIXATION right ankle fractures;  Surgeon: Mega Beck MD;  Location: [facility];  Service:    • ANKLE SURGERY      broken in    • BREAST SURGERY     • CHOLECYSTECTOMY     • COSMETIC SURGERY     • DILATATION AND CURETTAGE     • FRACTURE SURGERY  2017    right ankle                             PT Assessment/Plan       17 1100       PT Assessment    Assessment Comments Patient continues to ambulate with an improved gait pattern as she has increased her heel strike on her left.  -AS     PT Plan    PT Plan Comments Continue with current treatment plan.  -AS       User Key  (r) = Recorded By, (t) = Taken By, (c) = Cosigned By    Initials Name Provider Type    AS Alfredo Lopez, PT Physical Therapist                    Exercises       17 1100          Subjective Comments    Subjective Comments Patient reports her ankle continues to feel better.  -AS      Exercise 1    Exercise Name 1 NWB Gastroc/Soleus Stretch  -AS      Reps 1 15  -AS      Time (Seconds) 1 10 sec hold  -AS       Exercise 2    Exercise Name 2 ABC's  -AS      Reps 2 3   2x upper and 2x lower case  -AS      Exercise 3    Exercise Name 3 Pro Stretch  -AS      Time (Minutes) 3 3 min  -AS      Exercise 4    Exercise Name 4 Towel Curls  -AS      Time (Minutes) 4 3 min  -AS      Exercise 5    Exercise Name 5 Manual  -AS      Time (Minutes) 5 10 min  -AS      Exercise 6    Exercise Name 6 4-Way Rocker Board  -AS      Reps 6 Other   50  -AS      Exercise 7    Exercise Name 7 Big Toe Lifts  -AS      Reps 7 Other   50  -AS      Exercise 8    Exercise Name 8 DF/PF vs Band  -AS      Reps 8 15  -AS      Equipment 8 Theraband  -AS      Resistance 8 Other (comment)   Silver  -AS      Exercise 9    Exercise Name 9 IV/EV vs band  -AS      Reps 9 15  -AS      Equipment 9 Theraband  -AS      Resistance 9 Green  -AS      Exercise 10    Exercise Name 10 DL Heel Raises  -AS      Reps 10 Other   50  -AS      Exercise 11    Exercise Name 11 SL Stance  -AS      Sets 11 3  -AS      Time (Minutes) 11 1 min each  -AS      Exercise 12    Exercise Name 12 SL Heel Raises  -AS      Reps 12 30  -AS      Exercise 13    Exercise Name 13 FWD Step Ups  -AS      Reps 13 Other   40  -AS      Exercise 14    Exercise Name 14 Lateral Step Overs  -AS      Reps 14 Other   40  -AS        User Key  (r) = Recorded By, (t) = Taken By, (c) = Cosigned By    Initials Name Provider Type    AS Alfredo Lopez, PT Physical Therapist                                       Time Calculation:   Start Time: 0859  Stop Time: 0949  Time Calculation (min): 50 min    Therapy Charges for Today     Code Description Service Date Service Provider Modifiers Qty    41289265048 HC PT THER PROC EA 15 MIN 8/23/2017 Alfredo Lopez, PT GP 2    30301958341 HC PT MANUAL THERAPY EA 15 MIN 8/23/2017 Alfredo Lopez, PT GP 1                    Alfredo Lopez, PT  8/23/2017

## 2017-08-25 ENCOUNTER — HOSPITAL ENCOUNTER (OUTPATIENT)
Dept: PHYSICAL THERAPY | Facility: HOSPITAL | Age: 42
Setting detail: THERAPIES SERIES
Discharge: HOME | End: 2017-08-25

## 2017-08-25 DIAGNOSIS — S82.841A BIMALLEOLAR ANKLE FRACTURE, RIGHT, CLOSED, INITIAL ENCOUNTER: Primary | ICD-10-CM

## 2017-08-25 PROCEDURE — 97140 MANUAL THERAPY 1/> REGIONS: CPT

## 2017-08-25 PROCEDURE — 97110 THERAPEUTIC EXERCISES: CPT

## 2017-08-25 NOTE — THERAPY TREATMENT NOTE
Outpatient Physical Therapy Ortho Treatment Note   [place]     Patient Name: Elia Payne  : 1975  MRN: [id number]  Today's Date: 2017      Visit Date: 2017    Visit Dx:    ICD-10-CM ICD-9-CM   1. Bimalleolar ankle fracture, right, closed, initial encounter S82.841A 824.4       Patient Active Problem List   Diagnosis   • Abnormal weight gain   • Anxiety, generalized   • Inflamed seborrheic keratosis   • Acute maxillary sinusitis   • Ankle fracture   • Bimalleolar ankle fracture   • Status post open reduction with internal fixation (ORIF) of fracture of ankle, right 2017   • Healthcare maintenance        Past Medical History:   Diagnosis Date   • Depression    • PONV (postoperative nausea and vomiting)         Past Surgical History:   Procedure Laterality Date   • ABDOMINAL SURGERY     • ANKLE OPEN REDUCTION INTERNAL FIXATION Right 2017    Procedure:  OPEN REDUCTION INTERNAL FIXATION right ankle fractures;  Surgeon: Mega Beck MD;  Location: [facility];  Service:    • ANKLE SURGERY      broken in    • BREAST SURGERY     • CHOLECYSTECTOMY     • COSMETIC SURGERY     • DILATATION AND CURETTAGE     • FRACTURE SURGERY  2017    right ankle                             PT Assessment/Plan       17 1000       PT Assessment    Assessment Comments Patient continues to have increased tolerance to exercises and improved foot/ankle mobility. Patient continues to report decreased pain and improved function.  -AS     PT Plan    PT Plan Comments Continue with current treatment plan.  -AS       User Key  (r) = Recorded By, (t) = Taken By, (c) = Cosigned By    Initials Name Provider Type    AS Alfredo Lopez, PT Physical Therapist                    Exercises       17 1000          Subjective Comments    Subjective Comments Patient states she continues to improve.  -AS      Exercise 1    Exercise Name 1 NWB Gastroc/Soleus Stretch  -AS      Reps 1 15  -AS      Time  (Seconds) 1 10 sec hold  -AS      Exercise 2    Exercise Name 2 ABC's  -AS      Reps 2 3   2x upper and 2x lower case  -AS      Exercise 3    Exercise Name 3 Pro Stretch  -AS      Time (Minutes) 3 3 min  -AS      Exercise 4    Exercise Name 4 Towel Curls  -AS      Time (Minutes) 4 3 min  -AS      Exercise 5    Exercise Name 5 Manual  -AS      Time (Minutes) 5 10 min  -AS      Exercise 6    Exercise Name 6 4-Way Rocker Board  -AS      Reps 6 Other   50  -AS      Exercise 7    Exercise Name 7 Big Toe Lifts  -AS      Reps 7 Other   50  -AS      Exercise 8    Exercise Name 8 DF/PF vs Band  -AS      Reps 8 15  -AS      Equipment 8 Theraband  -AS      Resistance 8 Other (comment)   Silver  -AS      Exercise 9    Exercise Name 9 IV/EV vs band  -AS      Reps 9 15  -AS      Equipment 9 Theraband  -AS      Resistance 9 Green  -AS      Exercise 10    Exercise Name 10 DL Heel Raises  -AS      Reps 10 Other   50  -AS      Exercise 11    Exercise Name 11 SL Stance  -AS      Sets 11 3  -AS      Time (Minutes) 11 1 min each  -AS      Exercise 12    Exercise Name 12 SL Heel Raises  -AS      Reps 12 30  -AS      Exercise 13    Exercise Name 13 FWD Step Ups  -AS      Reps 13 Other   40  -AS      Exercise 14    Exercise Name 14 Lateral Step Overs  -AS      Reps 14 Other   40  -AS        User Key  (r) = Recorded By, (t) = Taken By, (c) = Cosigned By    Initials Name Provider Type    AS Alfredo Lopez, PT Physical Therapist                                       Time Calculation:   Start Time: 0900  Stop Time: 0951  Time Calculation (min): 51 min    Therapy Charges for Today     Code Description Service Date Service Provider Modifiers Qty    01252731903 HC PT THER PROC EA 15 MIN 8/25/2017 Alfredo Lopez, PT GP 1    01580214457 HC PT MANUAL THERAPY EA 15 MIN 8/25/2017 Alfredo Lopez, PT GP 1                    Alfredo Lopez, PT  8/25/2017

## 2017-08-30 ENCOUNTER — HOSPITAL ENCOUNTER (OUTPATIENT)
Dept: PHYSICAL THERAPY | Facility: HOSPITAL | Age: 42
Setting detail: THERAPIES SERIES
Discharge: HOME | End: 2017-08-30

## 2017-08-30 DIAGNOSIS — S82.841A BIMALLEOLAR ANKLE FRACTURE, RIGHT, CLOSED, INITIAL ENCOUNTER: Primary | ICD-10-CM

## 2017-08-30 PROCEDURE — 97110 THERAPEUTIC EXERCISES: CPT

## 2017-08-30 PROCEDURE — 97140 MANUAL THERAPY 1/> REGIONS: CPT

## 2017-09-01 ENCOUNTER — HOSPITAL ENCOUNTER (OUTPATIENT)
Dept: PHYSICAL THERAPY | Facility: HOSPITAL | Age: 42
Setting detail: THERAPIES SERIES
Discharge: HOME | End: 2017-09-01

## 2017-09-01 DIAGNOSIS — S82.841A BIMALLEOLAR ANKLE FRACTURE, RIGHT, CLOSED, INITIAL ENCOUNTER: Primary | ICD-10-CM

## 2017-09-01 PROCEDURE — 97110 THERAPEUTIC EXERCISES: CPT

## 2017-09-01 PROCEDURE — 97140 MANUAL THERAPY 1/> REGIONS: CPT

## 2017-09-01 NOTE — THERAPY TREATMENT NOTE
Outpatient Physical Therapy Ortho Treatment Note   [place]     Patient Name: Elia Payne  : 1975  MRN: [id number]  Today's Date: 2017      Visit Date: 2017    Visit Dx:    ICD-10-CM ICD-9-CM   1. Bimalleolar ankle fracture, right, closed, initial encounter S82.841A 824.4       Patient Active Problem List   Diagnosis   • Abnormal weight gain   • Anxiety, generalized   • Inflamed seborrheic keratosis   • Acute maxillary sinusitis   • Ankle fracture   • Bimalleolar ankle fracture   • Status post open reduction with internal fixation (ORIF) of fracture of ankle, right 2017   • Healthcare maintenance        Past Medical History:   Diagnosis Date   • Depression    • PONV (postoperative nausea and vomiting)         Past Surgical History:   Procedure Laterality Date   • ABDOMINAL SURGERY     • ANKLE OPEN REDUCTION INTERNAL FIXATION Right 2017    Procedure:  OPEN REDUCTION INTERNAL FIXATION right ankle fractures;  Surgeon: Mega Beck MD;  Location: [facility];  Service:    • ANKLE SURGERY      broken in    • BREAST SURGERY     • CHOLECYSTECTOMY     • COSMETIC SURGERY     • DILATATION AND CURETTAGE     • FRACTURE SURGERY  2017    right ankle                             PT Assessment/Plan       17 1000       PT Assessment    Assessment Comments Patient's ankle ROM and strength continue to improve with PT.  -AS     PT Plan    PT Plan Comments Continue with current treatment plan.  -AS       User Key  (r) = Recorded By, (t) = Taken By, (c) = Cosigned By    Initials Name Provider Type    AS Alfredo Lopez, PT Physical Therapist                    Exercises       17 1000          Subjective Comments    Subjective Comments Patient states she continues to get better.  -AS      Exercise 1    Exercise Name 1 NWB Gastroc/Soleus Stretch  -AS      Reps 1 15  -AS      Time (Seconds) 1 10 sec hold  -AS      Exercise 2    Exercise Name 2 ABC's  -AS      Reps 2 3   2x  upper and 2x lower case  -AS      Exercise 3    Exercise Name 3 Pro Stretch  -AS      Time (Minutes) 3 3 min  -AS      Exercise 4    Exercise Name 4 Towel Curls  -AS      Time (Minutes) 4 3 min  -AS      Exercise 5    Exercise Name 5 Manual  -AS      Time (Minutes) 5 10 min  -AS      Exercise 6    Exercise Name 6 4-Way Rocker Board  -AS      Reps 6 Other   50  -AS      Exercise 7    Exercise Name 7 Big Toe Lifts  -AS      Reps 7 Other   50  -AS      Exercise 8    Exercise Name 8 DF/PF vs Band  -AS      Reps 8 15  -AS      Equipment 8 Theraband  -AS      Resistance 8 Other (comment)   Silver  -AS      Exercise 9    Exercise Name 9 IV/EV vs band  -AS      Reps 9 15  -AS      Equipment 9 Theraband  -AS      Resistance 9 Green  -AS      Exercise 10    Exercise Name 10 DL Heel Raises  -AS      Reps 10 Other   50  -AS      Exercise 11    Exercise Name 11 SL Stance  -AS      Sets 11 3  -AS      Time (Minutes) 11 1 min each  -AS      Exercise 12    Exercise Name 12 SL Heel Raises  -AS      Reps 12 30  -AS      Exercise 13    Exercise Name 13 FWD Step Ups  -AS      Reps 13 Other   40  -AS      Exercise 14    Exercise Name 14 Lateral Step Overs  -AS      Reps 14 Other   40  -AS        User Key  (r) = Recorded By, (t) = Taken By, (c) = Cosigned By    Initials Name Provider Type    AS Alfredo Lopez, PT Physical Therapist                                       Time Calculation:   Start Time: 0900  Stop Time: 0944  Time Calculation (min): 44 min    Therapy Charges for Today     Code Description Service Date Service Provider Modifiers Qty    07413074181 HC PT THER PROC EA 15 MIN 9/1/2017 Alfredo Lopez, PT GP 1    80824271570 HC PT MANUAL THERAPY EA 15 MIN 9/1/2017 Alfredo Lopez, PT GP 1                    Alfredo Lopez, PT  9/1/2017

## 2017-09-06 ENCOUNTER — HOSPITAL ENCOUNTER (OUTPATIENT)
Dept: PHYSICAL THERAPY | Facility: HOSPITAL | Age: 42
Setting detail: THERAPIES SERIES
Discharge: HOME | End: 2017-09-06

## 2017-09-06 DIAGNOSIS — S82.841A BIMALLEOLAR ANKLE FRACTURE, RIGHT, CLOSED, INITIAL ENCOUNTER: Primary | ICD-10-CM

## 2017-09-06 PROCEDURE — 97140 MANUAL THERAPY 1/> REGIONS: CPT

## 2017-09-06 PROCEDURE — 97110 THERAPEUTIC EXERCISES: CPT

## 2017-09-06 NOTE — THERAPY TREATMENT NOTE
Outpatient Physical Therapy Ortho Treatment Note   [place] [place]     Patient Name: Elia Payne  : 1975  MRN: [id number]  Today's Date: 2017      Visit Date: 2017    Visit Dx:    ICD-10-CM ICD-9-CM   1. Bimalleolar ankle fracture, right, closed, initial encounter S82.841A 824.4       Patient Active Problem List   Diagnosis   • Abnormal weight gain   • Anxiety, generalized   • Inflamed seborrheic keratosis   • Acute maxillary sinusitis   • Ankle fracture   • Bimalleolar ankle fracture   • Status post open reduction with internal fixation (ORIF) of fracture of ankle, right 2017   • Healthcare maintenance        Past Medical History:   Diagnosis Date   • Depression    • PONV (postoperative nausea and vomiting)         Past Surgical History:   Procedure Laterality Date   • ABDOMINAL SURGERY     • ANKLE OPEN REDUCTION INTERNAL FIXATION Right 2017    Procedure:  OPEN REDUCTION INTERNAL FIXATION right ankle fractures;  Surgeon: Mega Beck MD;  Location: [facility];  Service:    • ANKLE SURGERY      broken in    • BREAST SURGERY     • CHOLECYSTECTOMY     • COSMETIC SURGERY     • DILATATION AND CURETTAGE     • FRACTURE SURGERY  2017    right ankle             PT Ortho       17 1100    Right Ankle    Dorsiflexion AROM Deficit 2  -AS      User Key  (r) = Recorded By, (t) = Taken By, (c) = Cosigned By    Initials Name Provider Type    AS Alfredo Lopez, PT Physical Therapist                            PT Assessment/Plan       17 1100       PT Assessment    Assessment Comments Continue with current treatment plan. Patient continues to ambulate with improved heel strike and toe off during gait.  -AS     PT Plan    PT Plan Comments Continue with current treatment plan.  -AS       User Key  (r) = Recorded By, (t) = Taken By, (c) = Cosigned By    Initials Name Provider Type    AS Alfredo Lopez, PT Physical Therapist                    Exercises       17  1100          Subjective Comments    Subjective Comments Patient reports she is pain free and she feels she is walking better.  -AS      Exercise 1    Exercise Name 1 NWB Gastroc/Soleus Stretch  -AS      Reps 1 15  -AS      Time (Seconds) 1 10 sec hold  -AS      Exercise 2    Exercise Name 2 ABC's  -AS      Reps 2 3   2x upper and 2x lower case  -AS      Exercise 3    Exercise Name 3 Pro Stretch  -AS      Time (Minutes) 3 3 min  -AS      Exercise 4    Exercise Name 4 Towel Curls  -AS      Time (Minutes) 4 3 min  -AS      Exercise 5    Exercise Name 5 Manual  -AS      Time (Minutes) 5 10 min  -AS      Exercise 6    Exercise Name 6 4-Way Rocker Board  -AS      Reps 6 Other   50  -AS      Exercise 7    Exercise Name 7 Big Toe Lifts  -AS      Reps 7 Other   50  -AS      Exercise 8    Exercise Name 8 DF/PF vs Band  -AS      Reps 8 20  -AS      Equipment 8 Theraband  -AS      Resistance 8 Other (comment)   Gold  -AS      Exercise 9    Exercise Name 9 IV/EV vs band  -AS      Reps 9 20  -AS      Equipment 9 Theraband  -AS      Resistance 9 Blue  -AS      Exercise 10    Exercise Name 10 DL Heel Raises  -AS      Reps 10 Other   50  -AS      Exercise 11    Exercise Name 11 SL Stance  -AS      Sets 11 3  -AS      Time (Minutes) 11 1 min each  -AS      Exercise 12    Exercise Name 12 SL Heel Raises  -AS      Reps 12 Other   50  -AS      Exercise 13    Exercise Name 13 FWD Step Ups  -AS      Reps 13 Other   50  -AS      Exercise 14    Exercise Name 14 Lateral Step Overs  -AS      Reps 14 Other   50  -AS        User Key  (r) = Recorded By, (t) = Taken By, (c) = Cosigned By    Initials Name Provider Type    AS Alfredo Lopez, RONIT Physical Therapist                                       Time Calculation:   Start Time: 0904  Stop Time: 0955  Time Calculation (min): 51 min    Therapy Charges for Today     Code Description Service Date Service Provider Modifiers Qty    61439657335  PT THER PROC EA 15 MIN 9/6/2017 Alfredo Honeycutt  John, PT GP 1    [id number]  PT MANUAL THERAPY EA 15 MIN 9/6/2017 Alfredo Lopez, PT GP 1                    Alfredo Lopez, PT  9/6/2017

## 2017-09-13 ENCOUNTER — HOSPITAL ENCOUNTER (OUTPATIENT)
Dept: PHYSICAL THERAPY | Facility: HOSPITAL | Age: 42
Setting detail: THERAPIES SERIES
Discharge: HOME | End: 2017-09-13

## 2017-09-13 DIAGNOSIS — S82.841A BIMALLEOLAR ANKLE FRACTURE, RIGHT, CLOSED, INITIAL ENCOUNTER: Primary | ICD-10-CM

## 2017-09-13 PROCEDURE — 97140 MANUAL THERAPY 1/> REGIONS: CPT

## 2017-09-13 PROCEDURE — 97110 THERAPEUTIC EXERCISES: CPT

## 2017-09-13 NOTE — THERAPY TREATMENT NOTE
Outpatient Physical Therapy Ortho Treatment Note   [facility] [place]     Patient Name: Elia Payne  : 1975  MRN: [id number]  Today's Date: 2017      Visit Date: 2017    Visit Dx:    ICD-10-CM ICD-9-CM   1. Bimalleolar ankle fracture, right, closed, initial encounter S82.841A 824.4       Patient Active Problem List   Diagnosis   • Abnormal weight gain   • Anxiety, generalized   • Inflamed seborrheic keratosis   • Acute maxillary sinusitis   • Ankle fracture   • Bimalleolar ankle fracture   • Status post open reduction with internal fixation (ORIF) of fracture of ankle, right 2017   • Healthcare maintenance        Past Medical History:   Diagnosis Date   • Depression    • PONV (postoperative nausea and vomiting)         Past Surgical History:   Procedure Laterality Date   • ABDOMINAL SURGERY     • ANKLE OPEN REDUCTION INTERNAL FIXATION Right 2017    Procedure:  OPEN REDUCTION INTERNAL FIXATION right ankle fractures;  Surgeon: Mega Beck MD;  Location: [facility];  Service:    • ANKLE SURGERY      broken in    • BREAST SURGERY     • CHOLECYSTECTOMY     • COSMETIC SURGERY     • DILATATION AND CURETTAGE     • FRACTURE SURGERY  2017    right ankle                             PT Assessment/Plan       17 1100       PT Assessment    Assessment Comments Patient continues to ambulate with improved heel strike and toe off during gait, however, it is improving as well as her reports of improved pain and function at this time.  -AS     PT Plan    PT Plan Comments Continue with current treatment plan.  -AS       User Key  (r) = Recorded By, (t) = Taken By, (c) = Cosigned By    Initials Name Provider Type    AS Alfredo Lopez, PT Physical Therapist                Modalities       17 1100          Subjective Comments    Subjective Comments Patient states that she continues to feel better.  -AS        User Key  (r) = Recorded By, (t) = Taken By, (c) = Cosigned By     Initials Name Provider Type    AS Alfredo Lopez, PT Physical Therapist                Exercises       09/13/17 1100          Subjective Comments    Subjective Comments Patient states that she continues to feel better.  -AS      Exercise 1    Exercise Name 1 NWB Gastroc/Soleus Stretch  -AS      Reps 1 15  -AS      Time (Seconds) 1 10 sec hold  -AS      Exercise 2    Exercise Name 2 ABC's  -AS      Reps 2 3   2x upper and 2x lower case  -AS      Exercise 3    Exercise Name 3 Pro Stretch  -AS      Time (Minutes) 3 3 min  -AS      Exercise 4    Exercise Name 4 Towel Curls  -AS      Time (Minutes) 4 3 min  -AS      Exercise 5    Exercise Name 5 Manual  -AS      Time (Minutes) 5 10 min  -AS      Exercise 6    Exercise Name 6 4-Way Rocker Board  -AS      Reps 6 Other   50  -AS      Exercise 7    Exercise Name 7 Big Toe Lifts  -AS      Reps 7 Other   50  -AS      Exercise 8    Exercise Name 8 DF/PF vs Band  -AS      Reps 8 20  -AS      Equipment 8 Theraband  -AS      Resistance 8 Other (comment)   Gold  -AS      Exercise 9    Exercise Name 9 IV/EV vs band  -AS      Reps 9 20  -AS      Equipment 9 Theraband  -AS      Resistance 9 Blue  -AS      Exercise 10    Exercise Name 10 DL Heel Raises  -AS      Reps 10 Other   50  -AS      Exercise 11    Exercise Name 11 SL Stance  -AS      Sets 11 3  -AS      Time (Minutes) 11 1 min each  -AS      Exercise 12    Exercise Name 12 SL Heel Raises  -AS      Reps 12 Other   50  -AS      Exercise 13    Exercise Name 13 FWD Step Ups  -AS      Reps 13 Other   50  -AS      Exercise 14    Exercise Name 14 Lateral Step Overs  -AS      Reps 14 Other   50  -AS        User Key  (r) = Recorded By, (t) = Taken By, (c) = Cosigned By    Initials Name Provider Type    AS Alfredo Lopez, PT Physical Therapist                                       Time Calculation:   Start Time: 0832  Stop Time: 0924  Time Calculation (min): 52 min    Therapy Charges for Today     Code Description Service  Date Service Provider Modifiers Qty    24187700888  PT THER PROC EA 15 MIN 9/13/2017 Alfredo Lopez, PT GP 1    [id number]  PT MANUAL THERAPY EA 15 MIN 9/13/2017 Alfredo Lopez, PT GP 1                    Alfredo Lopez, PT  9/13/2017

## 2017-09-18 DIAGNOSIS — E03.9 HYPOTHYROIDISM, UNSPECIFIED TYPE: Primary | ICD-10-CM

## 2017-09-20 ENCOUNTER — HOSPITAL ENCOUNTER (OUTPATIENT)
Dept: PHYSICAL THERAPY | Facility: HOSPITAL | Age: 42
Setting detail: THERAPIES SERIES
Discharge: HOME | End: 2017-09-20

## 2017-09-20 DIAGNOSIS — S82.841A BIMALLEOLAR ANKLE FRACTURE, RIGHT, CLOSED, INITIAL ENCOUNTER: Primary | ICD-10-CM

## 2017-09-20 PROCEDURE — 97110 THERAPEUTIC EXERCISES: CPT

## 2017-09-20 PROCEDURE — 97140 MANUAL THERAPY 1/> REGIONS: CPT

## 2017-09-20 NOTE — THERAPY RE-EVALUATION
Outpatient Physical Therapy Ortho Re-Evaluation   [facility] [place]     Patient Name: Elia Payne  : 1975  MRN: [id number]  Today's Date: 2017      Visit Date: 2017    Patient Active Problem List   Diagnosis   • Abnormal weight gain   • Anxiety, generalized   • Inflamed seborrheic keratosis   • Acute maxillary sinusitis   • Ankle fracture   • Bimalleolar ankle fracture   • Status post open reduction with internal fixation (ORIF) of fracture of ankle, right 2017   • Healthcare maintenance        Past Medical History:   Diagnosis Date   • Depression    • PONV (postoperative nausea and vomiting)         Past Surgical History:   Procedure Laterality Date   • ABDOMINAL SURGERY     • ANKLE OPEN REDUCTION INTERNAL FIXATION Right 2017    Procedure:  OPEN REDUCTION INTERNAL FIXATION right ankle fractures;  Surgeon: Mega Beck MD;  Location: [facility];  Service:    • ANKLE SURGERY      broken in    • BREAST SURGERY     • CHOLECYSTECTOMY     • COSMETIC SURGERY     • DILATATION AND CURETTAGE     • FRACTURE SURGERY  2017    right ankle       Visit Dx:     ICD-10-CM ICD-9-CM   1. Bimalleolar ankle fracture, right, closed, initial encounter S82.841A 824.4                 PT Ortho       17 1000    Right Ankle    Dorsiflexion AROM Deficit 4  -AS    Plantarflexion AROM Deficit WNL  -AS    Inversion AROM Deficit 13  -AS    Eversion AROM Deficit 6  -AS    Right Hip    Hip Flexion Gross Movement (5/5) normal  -AS    Hip Extension Gross Movement (5/5) normal  -AS    Left Knee    Knee Extension Gross Movement (5/5) normal  -AS    Knee Flexion Gross Movement (5/5) normal  -AS    Right Ankle/Foot    Ankle PF Gross Movement (5/5) normal  -AS    Ankle Dorsiflexion Gross Movement (4/5) good  -AS    Subtalar Inversion Gross Movement (4/5) good  -AS    Subtalar Eversion Gross Movement (4/5) good  -AS      User Key  (r) = Recorded By, (t) = Taken By, (c) = Cosigned By    Initials Name Provider  Type    AS Alfredo Lopez, PT Physical Therapist                                PT OP Goals       09/20/17 1000       PT Short Term Goals    STG Date to Achieve 10/04/17  -AS     STG 1 Patient to be compliant with her initial HEP for flexibility, ROM , and strengthening.  -AS     STG 1 Progress Met  -AS     STG 2 Patient to improve her right ankle DF ROM to neutral.  -AS     STG 2 Progress Met  -AS     STG 3 Patient to report pain on VAS of 4-5/10 when performing ADL's and work related activities.  -AS     STG 3 Progress Met  -AS     STG 4 Patient to improve her right general ankle strength to 4-/5.  -AS     STG 4 Progress Met  -AS     STG 5 Patient to improve her right ankle IV/EV to within 5 degrees of her contralateral ankle.  -AS     STG 5 Progress Partially Met;Ongoing;Progressing  -AS     Long Term Goals    LTG Date to Achieve 10/18/17  -AS     LTG 1 Patient to be compliant with her advanced HEP for flexibility, ROM , and strengthening.  -AS     LTG 1 Progress Met  -AS     LTG 2 Patient to improve her right ankle DF ROM to 5-10 degrees.  -AS     LTG 2 Progress Ongoing;Progressing  -AS     LTG 3 Patient to ambulate with normal heel to toe gait pattern community distances without walking boot and without crutches.  -AS     LTG 3 Progress Ongoing;Progressing  -AS     LTG 4 Patient to report pain on VAS of 1-2/10 when performing ADL's and work related activities.  -AS     LTG 4 Progress Met  -AS     LTG 5 Patient to improve her right general ankle strength to 4/5.  -AS     LTG 5 Progress Met  -AS     LTG 6 Patient to improve her right ankle IV/EV to WNL.  -AS     LTG 6 Progress Partially Met;Ongoing;Progressing  -AS     Time Calculation    PT Goal Re-Cert Due Date 10/18/17  -AS       User Key  (r) = Recorded By, (t) = Taken By, (c) = Cosigned By    Initials Name Provider Type    AS Alfredo Lopez, PT Physical Therapist                PT Assessment/Plan       09/20/17 1000       PT Assessment     Assessment Comments Patient continues to do very well with PT as she demonstrates improved anklr ROM and strength in all planes of motion. Patient continues to ambulate with an improved gait pattern with increased heel strike and toe off.  -AS     PT Plan    PT Plan Comments Continue with current treatment plan.  -AS       User Key  (r) = Recorded By, (t) = Taken By, (c) = Cosigned By    Initials Name Provider Type    AS Alfredo Lopez, PT Physical Therapist                  Exercises       09/20/17 1000          Subjective Comments    Subjective Comments Patient reports she continues to feel better.  -AS      Exercise 1    Exercise Name 1 NWB Gastroc/Soleus Stretch  -AS      Reps 1 15  -AS      Time (Seconds) 1 10 sec hold  -AS      Exercise 2    Exercise Name 2 ABC's  -AS      Reps 2 3   2x upper and 2x lower case  -AS      Exercise 3    Exercise Name 3 Pro Stretch  -AS      Time (Minutes) 3 3 min  -AS      Exercise 4    Exercise Name 4 Towel Curls  -AS      Time (Minutes) 4 3 min  -AS      Exercise 5    Exercise Name 5 Manual  -AS      Time (Minutes) 5 10 min  -AS      Exercise 6    Exercise Name 6 4-Way Rocker Board  -AS      Reps 6 Other   50  -AS      Exercise 7    Exercise Name 7 Big Toe Lifts  -AS      Reps 7 Other   50  -AS      Exercise 8    Exercise Name 8 DF/PF vs Band  -AS      Reps 8 25  -AS      Equipment 8 Theraband  -AS      Resistance 8 Other (comment)   Gold  -AS      Exercise 9    Exercise Name 9 IV/EV vs band  -AS      Reps 9 25  -AS      Equipment 9 Theraband  -AS      Resistance 9 Blue  -AS      Exercise 10    Exercise Name 10 DL Heel Raises  -AS      Reps 10 Other   50  -AS      Exercise 11    Exercise Name 11 SL Stance  -AS      Sets 11 3  -AS      Time (Minutes) 11 1 min each  -AS      Exercise 12    Exercise Name 12 SL Heel Raises  -AS      Reps 12 Other   50  -AS      Exercise 13    Exercise Name 13 FWD Step Ups  -AS      Reps 13 Other   50  -AS      Exercise 14    Exercise Name 14  Lateral Step Overs  -AS      Reps 14 Other   50  -AS        User Key  (r) = Recorded By, (t) = Taken By, (c) = Cosigned By    Initials Name Provider Type    AS Alfredo Lopez, PT Physical Therapist                              Time Calculation:   Start Time: 0901  Stop Time: 0949  Time Calculation (min): 48 min     Therapy Charges for Today     Code Description Service Date Service Provider Modifiers Qty    07319103602  PT THER PROC EA 15 MIN 9/20/2017 Alfredo Lopez, PT GP 1    [id number]  PT MANUAL THERAPY EA 15 MIN 9/20/2017 Alfredo Lopez, PT GP 1                    Alfredo Lopez, PT  9/20/2017

## 2017-09-22 LAB
T4 FREE SERPL-MCNC: 0.89 NG/DL (ref 0.93–1.7)
TSH SERPL DL<=0.005 MIU/L-ACNC: 2.93 MIU/ML (ref 0.27–4.2)

## 2017-09-23 ENCOUNTER — RESULTS ENCOUNTER (OUTPATIENT)
Dept: INTERNAL MEDICINE | Facility: CLINIC | Age: 42
End: 2017-09-23

## 2017-09-23 DIAGNOSIS — E03.9 HYPOTHYROIDISM, UNSPECIFIED TYPE: ICD-10-CM

## 2017-09-25 ENCOUNTER — TELEPHONE (OUTPATIENT)
Dept: INTERNAL MEDICINE | Facility: CLINIC | Age: 42
End: 2017-09-25

## 2017-09-25 RX ORDER — LEVOTHYROXINE SODIUM 0.03 MG/1
25 TABLET ORAL DAILY
Qty: 30 TABLET | Refills: 3 | Status: SHIPPED | OUTPATIENT
Start: 2017-09-25 | End: 2017-10-16 | Stop reason: SDUPTHER

## 2017-09-25 NOTE — TELEPHONE ENCOUNTER
Patient advised, medication sent in.    ---- Message from TED Thompson sent at 9/25/2017  8:12 AM EDT -----  Her thyroid function is running low. Start Levothyroxine 25 mcgs daily and repeat a TSH and free T4 in 1 month with FU appt to discuss.   ----- Message -----     From: Guy, Reflab Results In     Sent: 9/22/2017   4:18 PM       To: TED Thompson

## 2017-09-27 ENCOUNTER — HOSPITAL ENCOUNTER (OUTPATIENT)
Dept: PHYSICAL THERAPY | Facility: HOSPITAL | Age: 42
Setting detail: THERAPIES SERIES
Discharge: HOME | End: 2017-09-27

## 2017-09-27 DIAGNOSIS — S82.841A BIMALLEOLAR ANKLE FRACTURE, RIGHT, CLOSED, INITIAL ENCOUNTER: Primary | ICD-10-CM

## 2017-09-27 PROCEDURE — 97110 THERAPEUTIC EXERCISES: CPT

## 2017-09-27 PROCEDURE — 97140 MANUAL THERAPY 1/> REGIONS: CPT

## 2017-09-27 NOTE — THERAPY TREATMENT NOTE
Outpatient Physical Therapy Ortho Treatment Note   [place] [place]     Patient Name: Elia Payne  : 1975  MRN: [id number]  Today's Date: 2017      Visit Date: 2017    Visit Dx:    ICD-10-CM ICD-9-CM   1. Bimalleolar ankle fracture, right, closed, initial encounter S82.841A 824.4       Patient Active Problem List   Diagnosis   • Abnormal weight gain   • Anxiety, generalized   • Inflamed seborrheic keratosis   • Acute maxillary sinusitis   • Ankle fracture   • Bimalleolar ankle fracture   • Status post open reduction with internal fixation (ORIF) of fracture of ankle, right 2017   • Healthcare maintenance        Past Medical History:   Diagnosis Date   • Depression    • PONV (postoperative nausea and vomiting)         Past Surgical History:   Procedure Laterality Date   • ABDOMINAL SURGERY     • ANKLE OPEN REDUCTION INTERNAL FIXATION Right 2017    Procedure:  OPEN REDUCTION INTERNAL FIXATION right ankle fractures;  Surgeon: Mega Beck MD;  Location: [facility];  Service:    • ANKLE SURGERY      broken in    • BREAST SURGERY     • CHOLECYSTECTOMY     • COSMETIC SURGERY     • DILATATION AND CURETTAGE     • FRACTURE SURGERY  2017    right ankle                             PT Assessment/Plan       17 0900       PT Assessment    Assessment Comments Patient continues to do very well with PT as she demonstrates improved anklr ROM and strength in all planes of motion. Patient continues to ambulate with an improved gait pattern with increased heel strike and toe off.  -AS     PT Plan    PT Plan Comments Continue with current treatment plan.  -AS       User Key  (r) = Recorded By, (t) = Taken By, (c) = Cosigned By    Initials Name Provider Type    AS Alfredo Lopez, PT Physical Therapist                    Exercises       17 0900          Subjective Comments    Subjective Comments Patient states she is doing so much better. She states that she is to see PT for  1x per week for 3-4 weeks.  -AS      Exercise 1    Exercise Name 1 NWB Gastroc/Soleus Stretch  -AS      Reps 1 15  -AS      Time (Seconds) 1 10 sec hold  -AS      Exercise 2    Exercise Name 2 ABC's  -AS      Reps 2 3   2x upper and 2x lower case  -AS      Exercise 3    Exercise Name 3 Pro Stretch  -AS      Time (Minutes) 3 3 min  -AS      Exercise 4    Exercise Name 4 Towel Curls  -AS      Time (Minutes) 4 3 min  -AS      Exercise 5    Exercise Name 5 Manual  -AS      Time (Minutes) 5 10 min  -AS      Exercise 6    Exercise Name 6 4-Way Rocker Board  -AS      Reps 6 Other   50  -AS      Exercise 7    Exercise Name 7 Big Toe Lifts  -AS      Reps 7 Other   50  -AS      Exercise 8    Exercise Name 8 DF/PF vs Band  -AS      Reps 8 25  -AS      Equipment 8 Theraband  -AS      Resistance 8 Other (comment)   Gold  -AS      Exercise 9    Exercise Name 9 IV/EV vs band  -AS      Reps 9 25  -AS      Equipment 9 Theraband  -AS      Resistance 9 Blue  -AS      Exercise 10    Exercise Name 10 DL Heel Raises  -AS      Reps 10 Other   50  -AS      Exercise 11    Exercise Name 11 SL Stance  -AS      Sets 11 3  -AS      Time (Minutes) 11 1 min each  -AS      Exercise 12    Exercise Name 12 SL Heel Raises  -AS      Reps 12 Other   50  -AS      Exercise 13    Exercise Name 13 FWD Step Ups  -AS      Reps 13 Other   50  -AS      Exercise 14    Exercise Name 14 Lateral Step Overs  -AS      Reps 14 Other   50  -AS        User Key  (r) = Recorded By, (t) = Taken By, (c) = Cosigned By    Initials Name Provider Type    AS Alfredo Lopez, PT Physical Therapist                                       Time Calculation:   Start Time: 0911  Stop Time: 1000  Time Calculation (min): 49 min    Therapy Charges for Today     Code Description Service Date Service Provider Modifiers Qty    39310468309 HC PT THER PROC EA 15 MIN 9/27/2017 Alfredo Lopez, PT GP 1    [id number] HC PT MANUAL THERAPY EA 15 MIN 9/27/2017 Alfreod Lopez, PT GP  1                    Alfredo Lopez, PT  9/27/2017

## 2017-09-29 ENCOUNTER — HOSPITAL ENCOUNTER (OUTPATIENT)
Dept: PHYSICAL THERAPY | Facility: HOSPITAL | Age: 42
Setting detail: THERAPIES SERIES
Discharge: HOME | End: 2017-09-29

## 2017-09-29 DIAGNOSIS — S82.841A BIMALLEOLAR ANKLE FRACTURE, RIGHT, CLOSED, INITIAL ENCOUNTER: Primary | ICD-10-CM

## 2017-09-29 PROCEDURE — 97110 THERAPEUTIC EXERCISES: CPT

## 2017-09-29 PROCEDURE — 97140 MANUAL THERAPY 1/> REGIONS: CPT

## 2017-09-29 NOTE — THERAPY TREATMENT NOTE
Outpatient Physical Therapy Ortho Treatment Note   [place]     Patient Name: Elia Payne  : 1975  MRN: [id number]  Today's Date: 2017      Visit Date: 2017    Visit Dx:    ICD-10-CM ICD-9-CM   1. Bimalleolar ankle fracture, right, closed, initial encounter S82.841A 824.4       Patient Active Problem List   Diagnosis   • Abnormal weight gain   • Anxiety, generalized   • Inflamed seborrheic keratosis   • Acute maxillary sinusitis   • Ankle fracture   • Bimalleolar ankle fracture   • Status post open reduction with internal fixation (ORIF) of fracture of ankle, right 2017   • Healthcare maintenance        Past Medical History:   Diagnosis Date   • Depression    • PONV (postoperative nausea and vomiting)         Past Surgical History:   Procedure Laterality Date   • ABDOMINAL SURGERY     • ANKLE OPEN REDUCTION INTERNAL FIXATION Right 2017    Procedure:  OPEN REDUCTION INTERNAL FIXATION right ankle fractures;  Surgeon: Mega Beck MD;  Location: [facility];  Service:    • ANKLE SURGERY      broken in    • BREAST SURGERY     • CHOLECYSTECTOMY     • COSMETIC SURGERY     • DILATATION AND CURETTAGE     • FRACTURE SURGERY  2017    right ankle                             PT Assessment/Plan       17 1000       PT Assessment    Assessment Comments Patient is doing very well with PT as she continues to improve with strength, ROM, and pain. Patient is walking with a near normal heel to toe gait pattern.  -AS     PT Plan    PT Plan Comments Continue with current treatment plan.  -AS       User Key  (r) = Recorded By, (t) = Taken By, (c) = Cosigned By    Initials Name Provider Type    AS Alfredo Lopez, PT Physical Therapist                    Exercises       17 1000          Subjective Comments    Subjective Comments Patient states that she is doing very well and she is very pleased with where she is in her recovery at this point.   -AS      Exercise 1     Exercise Name 1 NWB Gastroc/Soleus Stretch  -AS      Reps 1 15  -AS      Time (Seconds) 1 10 sec hold  -AS      Exercise 2    Exercise Name 2 ABC's  -AS      Reps 2 3   2x upper and 2x lower case  -AS      Exercise 3    Exercise Name 3 Pro Stretch  -AS      Time (Minutes) 3 3 min  -AS      Exercise 4    Exercise Name 4 Towel Curls  -AS      Time (Minutes) 4 3 min  -AS      Exercise 5    Exercise Name 5 Manual  -AS      Time (Minutes) 5 10 min  -AS      Exercise 6    Exercise Name 6 4-Way Rocker Board  -AS      Reps 6 Other   50  -AS      Exercise 7    Exercise Name 7 Big Toe Lifts  -AS      Reps 7 Other   50  -AS      Exercise 8    Exercise Name 8 DF/PF vs Band  -AS      Reps 8 25  -AS      Equipment 8 Theraband  -AS      Resistance 8 Other (comment)   Gold  -AS      Exercise 9    Exercise Name 9 IV/EV vs band  -AS      Reps 9 25  -AS      Equipment 9 Theraband  -AS      Resistance 9 Blue  -AS      Exercise 10    Exercise Name 10 DL Heel Raises  -AS      Reps 10 Other   50  -AS      Exercise 11    Exercise Name 11 SL Stance  -AS      Sets 11 3  -AS      Time (Minutes) 11 1 min each  -AS      Exercise 12    Exercise Name 12 SL Heel Raises  -AS      Reps 12 Other   50  -AS      Exercise 13    Exercise Name 13 FWD Step Ups  -AS      Reps 13 Other   50  -AS      Exercise 14    Exercise Name 14 Lateral Step Overs  -AS      Reps 14 Other   50  -AS        User Key  (r) = Recorded By, (t) = Taken By, (c) = Cosigned By    Initials Name Provider Type    AS Alfredo Lopez, PT Physical Therapist                                       Time Calculation:   Start Time: 0900  Stop Time: 0954  Time Calculation (min): 54 min    Therapy Charges for Today     Code Description Service Date Service Provider Modifiers Qty    94642794110 HC PT THER PROC EA 15 MIN 9/29/2017 Alfredo Lopez, PT GP 1    [id number] HC PT MANUAL THERAPY EA 15 MIN 9/29/2017 Alfredo Lopez, PT GP 1                    Alfredo Lopez,  PT  9/29/2017

## 2017-10-04 ENCOUNTER — HOSPITAL ENCOUNTER (OUTPATIENT)
Dept: PHYSICAL THERAPY | Facility: HOSPITAL | Age: 42
Setting detail: THERAPIES SERIES
Discharge: HOME | End: 2017-10-04

## 2017-10-04 DIAGNOSIS — S82.841A BIMALLEOLAR ANKLE FRACTURE, RIGHT, CLOSED, INITIAL ENCOUNTER: Primary | ICD-10-CM

## 2017-10-04 PROCEDURE — 97140 MANUAL THERAPY 1/> REGIONS: CPT

## 2017-10-04 PROCEDURE — 97110 THERAPEUTIC EXERCISES: CPT

## 2017-10-04 NOTE — THERAPY TREATMENT NOTE
Outpatient Physical Therapy Ortho Treatment Note   [place]     Patient Name: Elia Payne  : 1975  MRN: [id number]  Today's Date: 10/4/2017      Visit Date: 10/04/2017    Visit Dx:    ICD-10-CM ICD-9-CM   1. Bimalleolar ankle fracture, right, closed, initial encounter S82.841A 824.4       Patient Active Problem List   Diagnosis   • Abnormal weight gain   • Anxiety, generalized   • Inflamed seborrheic keratosis   • Acute maxillary sinusitis   • Ankle fracture   • Bimalleolar ankle fracture   • Status post open reduction with internal fixation (ORIF) of fracture of ankle, right 2017   • Healthcare maintenance        Past Medical History:   Diagnosis Date   • Depression    • PONV (postoperative nausea and vomiting)         Past Surgical History:   Procedure Laterality Date   • ABDOMINAL SURGERY     • ANKLE OPEN REDUCTION INTERNAL FIXATION Right 2017    Procedure:  OPEN REDUCTION INTERNAL FIXATION right ankle fractures;  Surgeon: Mega Beck MD;  Location: [facility];  Service:    • ANKLE SURGERY      broken in    • BREAST SURGERY     • CHOLECYSTECTOMY     • COSMETIC SURGERY     • DILATATION AND CURETTAGE     • FRACTURE SURGERY  2017    right ankle                             PT Assessment/Plan       10/04/17 1000       PT Assessment    Assessment Comments Patient continues to ambulate with improved heel strike and toe off during gait, however, it is improving as well as her reports of improved pain and function at this time.  -AS     PT Plan    PT Plan Comments Continue with current treatment plan.  -AS       User Key  (r) = Recorded By, (t) = Taken By, (c) = Cosigned By    Initials Name Provider Type    AS Alfredo Lopez, PT Physical Therapist                    Exercises       10/04/17 1000          Subjective Comments    Subjective Comments Patient states she continues to do well at this time.  -AS      Exercise 1    Exercise Name 1 NWB Gastroc/Soleus Stretch  -AS       Reps 1 15  -AS      Time (Seconds) 1 10 sec hold  -AS      Exercise 2    Exercise Name 2 ABC's  -AS      Reps 2 3   2x upper and 2x lower case  -AS      Exercise 3    Exercise Name 3 Pro Stretch  -AS      Time (Minutes) 3 3 min  -AS      Exercise 4    Exercise Name 4 Towel Curls  -AS      Time (Minutes) 4 3 min  -AS      Exercise 5    Exercise Name 5 Manual  -AS      Time (Minutes) 5 10 min  -AS      Exercise 6    Exercise Name 6 4-Way Rocker Board  -AS      Reps 6 Other   50  -AS      Exercise 7    Exercise Name 7 Big Toe Lifts  -AS      Reps 7 Other   50  -AS      Exercise 8    Exercise Name 8 DF/PF vs Band  -AS      Reps 8 25  -AS      Equipment 8 Theraband  -AS      Resistance 8 Other (comment)   Gold  -AS      Exercise 9    Exercise Name 9 IV/EV vs band  -AS      Reps 9 25  -AS      Equipment 9 Theraband  -AS      Resistance 9 Blue  -AS      Exercise 10    Exercise Name 10 DL Heel Raises  -AS      Reps 10 Other   50  -AS      Exercise 11    Exercise Name 11 SL Stance  -AS      Sets 11 3  -AS      Time (Minutes) 11 1 min each  -AS      Exercise 12    Exercise Name 12 SL Heel Raises  -AS      Reps 12 Other   50  -AS      Exercise 13    Exercise Name 13 FWD Step Ups  -AS      Reps 13 Other   50  -AS      Exercise 14    Exercise Name 14 Lateral Step Overs  -AS      Reps 14 Other   50  -AS        User Key  (r) = Recorded By, (t) = Taken By, (c) = Cosigned By    Initials Name Provider Type    AS Alfredo Lopez, PT Physical Therapist                                       Time Calculation:   Start Time: 0915  Stop Time: 0958  Time Calculation (min): 43 min    Therapy Charges for Today     Code Description Service Date Service Provider Modifiers Qty    25070141111 HC PT THER PROC EA 15 MIN 10/4/2017 Alfredo Lopez, PT GP 1    95506172219 HC PT MANUAL THERAPY EA 15 MIN 10/4/2017 Alfredo Lopez, PT GP 1                    Alfredo Lopez, PT  10/4/2017

## 2017-10-11 ENCOUNTER — HOSPITAL ENCOUNTER (OUTPATIENT)
Dept: PHYSICAL THERAPY | Facility: HOSPITAL | Age: 42
Setting detail: THERAPIES SERIES
Discharge: HOME | End: 2017-10-11

## 2017-10-11 DIAGNOSIS — S82.841A BIMALLEOLAR ANKLE FRACTURE, RIGHT, CLOSED, INITIAL ENCOUNTER: Primary | ICD-10-CM

## 2017-10-11 PROCEDURE — 97110 THERAPEUTIC EXERCISES: CPT

## 2017-10-11 PROCEDURE — 97140 MANUAL THERAPY 1/> REGIONS: CPT

## 2017-10-11 NOTE — THERAPY DISCHARGE NOTE
Outpatient Physical Therapy Ortho Treatment Note/Discharge Summary  CHEKO [facility]ilera     Patient Name: Elia Payne  : 1975  MRN: [id number]  Today's Date: 10/11/2017      Visit Date: 10/11/2017    Visit Dx:    ICD-10-CM ICD-9-CM   1. Bimalleolar ankle fracture, right, closed, initial encounter S82.841A 824.4       Patient Active Problem List   Diagnosis   • Abnormal weight gain   • Anxiety, generalized   • Inflamed seborrheic keratosis   • Acute maxillary sinusitis   • Ankle fracture   • Bimalleolar ankle fracture   • Status post open reduction with internal fixation (ORIF) of fracture of ankle, right 2017   • Healthcare maintenance        Past Medical History:   Diagnosis Date   • Depression    • PONV (postoperative nausea and vomiting)         Past Surgical History:   Procedure Laterality Date   • ABDOMINAL SURGERY     • ANKLE OPEN REDUCTION INTERNAL FIXATION Right 2017    Procedure:  OPEN REDUCTION INTERNAL FIXATION right ankle fractures;  Surgeon: Mega Beck MD;  Location: [facility];  Service:    • ANKLE SURGERY      broken in    • BREAST SURGERY     • CHOLECYSTECTOMY     • COSMETIC SURGERY     • DILATATION AND CURETTAGE     • FRACTURE SURGERY  2017    right ankle             PT Ortho       10/11/17 1000    Right Ankle    Dorsiflexion AROM Deficit 4  -AS    Plantarflexion AROM Deficit WNL  -AS    Inversion AROM Deficit 14  -AS    Eversion AROM Deficit 7  -AS    Right Hip    Hip Flexion Gross Movement (5/5) normal  -AS    Hip Extension Gross Movement (5/5) normal  -AS    Left Knee    Knee Extension Gross Movement (5/5) normal  -AS    Knee Flexion Gross Movement (5/5) normal  -AS    Right Ankle/Foot    Ankle PF Gross Movement (5/5) normal  -AS    Ankle Dorsiflexion Gross Movement (4/5) good  -AS    Subtalar Inversion Gross Movement (4/5) good  -AS    Subtalar Eversion Gross Movement (4/5) good  -AS      User Key  (r) = Recorded By, (t) = Taken By, (c) = Cosigned By     Initials Name Provider Type    AS Alfredo Lopez, PT Physical Therapist                            PT Assessment/Plan       10/11/17 1000       PT Assessment    Assessment Comments Patient has progressed very well with PT at this time and she and PT feel she is ready to be D/C at this time. Patient continues to ambulate community distances with improving heel to toe gait pattern. She has good strength and her ROM is WFL. Patient reports she is pain free with all ambulation and when performing ADL's, recreational, and work related activities.  -AS     PT Plan    PT Plan Comments D/C patient from outpatient PT at this time as she has met most of her goals and she has met her maximal function at this time.  -AS       User Key  (r) = Recorded By, (t) = Taken By, (c) = Cosigned By    Initials Name Provider Type    AS Alfredo Lopez, PT Physical Therapist                    Exercises       10/11/17 1000          Subjective Comments    Subjective Comments Patient states that she is feeling very well and states she is ready to be D/C from PT at this time.  -AS      Exercise 1    Exercise Name 1 NWB Gastroc/Soleus Stretch  -AS      Reps 1 15  -AS      Time (Seconds) 1 10 sec hold  -AS      Exercise 2    Exercise Name 2 ABC's  -AS      Reps 2 3   2x upper and 2x lower case  -AS      Exercise 3    Exercise Name 3 Pro Stretch  -AS      Time (Minutes) 3 3 min  -AS      Exercise 4    Exercise Name 4 Towel Curls  -AS      Time (Minutes) 4 3 min  -AS      Exercise 5    Exercise Name 5 Manual  -AS      Time (Minutes) 5 10 min  -AS      Exercise 6    Exercise Name 6 4-Way Rocker Board  -AS      Reps 6 Other   50  -AS      Exercise 7    Exercise Name 7 Big Toe Lifts  -AS      Reps 7 Other   50  -AS      Exercise 8    Exercise Name 8 DF/PF vs Band  -AS      Reps 8 25  -AS      Equipment 8 Theraband  -AS      Resistance 8 Other (comment)   Gold  -AS      Exercise 9    Exercise Name 9 IV/EV vs band  -AS      Reps 9 25  -AS       Equipment 9 Theraband  -AS      Resistance 9 Blue  -AS      Exercise 10    Exercise Name 10 DL Heel Raises  -AS      Reps 10 Other   50  -AS      Exercise 11    Exercise Name 11 SL Stance  -AS      Sets 11 3  -AS      Time (Minutes) 11 1 min each  -AS      Exercise 12    Exercise Name 12 SL Heel Raises  -AS      Reps 12 Other   50  -AS      Exercise 13    Exercise Name 13 FWD Step Ups  -AS      Reps 13 Other   50  -AS      Exercise 14    Exercise Name 14 Lateral Step Overs  -AS      Reps 14 Other   50  -AS        User Key  (r) = Recorded By, (t) = Taken By, (c) = Cosigned By    Initials Name Provider Type    AS Alfredo Lopez, PT Physical Therapist                               PT OP Goals       10/11/17 1000       PT Short Term Goals    STG 1 Patient to be compliant with her initial HEP for flexibility, ROM , and strengthening.  -AS     STG 1 Progress Met  -AS     STG 2 Patient to improve her right ankle DF ROM to neutral.  -AS     STG 2 Progress Met  -AS     STG 3 Patient to report pain on VAS of 4-5/10 when performing ADL's and work related activities.  -AS     STG 3 Progress Met  -AS     STG 4 Patient to improve her right general ankle strength to 4-/5.  -AS     STG 4 Progress Met  -AS     STG 5 Patient to improve her right ankle IV/EV to within 5 degrees of her contralateral ankle.  -AS     STG 5 Progress Not Met  -AS     Long Term Goals    LTG 1 Patient to be compliant with her advanced HEP for flexibility, ROM , and strengthening.  -AS     LTG 1 Progress Met  -AS     LTG 2 Patient to improve her right ankle DF ROM to 5-10 degrees.  -AS     LTG 2 Progress Not Met  -AS     LTG 3 Patient to ambulate with normal heel to toe gait pattern community distances without walking boot and without crutches.  -AS     LTG 3 Progress Met  -AS     LTG 4 Patient to report pain on VAS of 1-2/10 when performing ADL's and work related activities.  -AS     LTG 4 Progress Met  -AS     LTG 5 Patient to improve her right  general ankle strength to 4/5.  -AS     LTG 5 Progress Met  -AS     LTG 6 Patient to improve her right ankle IV/EV to WNL.  -AS     LTG 6 Progress Partially Met  -AS       User Key  (r) = Recorded By, (t) = Taken By, (c) = Cosigned By    Initials Name Provider Type    AS Alfredo Lopez, PT Physical Therapist                    Time Calculation:   Start Time: 0913  Stop Time: 0953  Time Calculation (min): 40 min    Therapy Charges for Today     Code Description Service Date Service Provider Modifiers Qty    56166143724 HC PT THER PROC EA 15 MIN 10/11/2017 Alfredo Lopez, PT GP 1    64263652005 HC PT MANUAL THERAPY EA 15 MIN 10/11/2017 Alfredo Lopez, PT GP 1                OP PT Discharge Summary  Date of Discharge: 10/11/17  Reason for Discharge: Maximum functional potential achieved  Outcomes Achieved: Patient able to partially acheive established goals  Discharge Destination: Home with home program  Discharge Instructions: Patient was instructed to continue with her HEP.      Alfredo Lopez, PT  10/11/2017

## 2017-10-16 ENCOUNTER — TELEPHONE (OUTPATIENT)
Dept: INTERNAL MEDICINE | Facility: CLINIC | Age: 42
End: 2017-10-16

## 2017-10-16 RX ORDER — LEVOTHYROXINE SODIUM 0.03 MG/1
TABLET ORAL
Qty: 30 TABLET | Refills: 3 | OUTPATIENT
Start: 2017-10-16 | End: 2018-02-21

## 2017-10-16 NOTE — TELEPHONE ENCOUNTER
----- Message from TED Thompson sent at 10/16/2017 11:29 AM EDT -----  Regarding: FW: THYROID MEDICATION  Contact: [phone]  [facility] [place]  Si/2 tablet PO daily  Disp #30  ----- Message -----     From: Nicol Caldwell MA     Sent: 10/16/2017  11:05 AM       To: TED Thompson  Subject: FW: THYROID MEDICATION                               ----- Message -----     From: Emilie Christian     Sent: 10/16/2017  10:47 AM       To: [facility]  Subject: THYROID MEDICATION                               JORGE L    PT CALLED RE: SIDE EFFECTS FROM MED. SHE SAID THAT HER HAIR IS FALLING OUT PRETTY BAD, SHE IS NASEOUS, AND HYPER. CAN SHE PLEASE CUT DOSAGE IN HALF?  SHE IS SET UP FOR LABS NEXT WEEK.    PLEASE CALL HER    Left message for pt

## 2017-10-16 NOTE — TELEPHONE ENCOUNTER
----- Message from TED Thompson sent at 10/16/2017 11:29 AM EDT -----  Regarding: FW: THYROID MEDICATION  Contact: [phone]  [facility] [place]  Si/2 tablet PO daily  Disp #30  ----- Message -----     From: Nicol Caldwell MA     Sent: 10/16/2017  11:05 AM       To: TED Thompson  Subject: FW: THYROID MEDICATION                               ----- Message -----     From: Emilie Christian     Sent: 10/16/2017  10:47 AM       To: [facility]  Subject: THYROID MEDICATION                               JORGE L    PT CALLED RE: SIDE EFFECTS FROM MED. SHE SAID THAT HER HAIR IS FALLING OUT PRETTY BAD, SHE IS NASEOUS, AND HYPER. CAN SHE PLEASE CUT DOSAGE IN HALF?  SHE IS SET UP FOR LABS NEXT WEEK.    PLEASE CALL HER    Left message for pt

## 2017-10-17 DIAGNOSIS — E03.9 HYPOTHYROIDISM, ADULT: ICD-10-CM

## 2017-10-17 DIAGNOSIS — Z00.00 HEALTHCARE MAINTENANCE: Primary | ICD-10-CM

## 2017-10-18 DIAGNOSIS — R63.5 ABNORMAL WEIGHT GAIN: Primary | ICD-10-CM

## 2017-10-18 DIAGNOSIS — Z00.00 HEALTHCARE MAINTENANCE: ICD-10-CM

## 2017-10-18 DIAGNOSIS — F41.9 ANXIETY: ICD-10-CM

## 2017-10-18 DIAGNOSIS — L82.0 INFLAMED SEBORRHEIC KERATOSIS: ICD-10-CM

## 2017-10-19 ENCOUNTER — RESULTS ENCOUNTER (OUTPATIENT)
Dept: INTERNAL MEDICINE | Facility: CLINIC | Age: 42
End: 2017-10-19

## 2017-10-19 DIAGNOSIS — F41.9 ANXIETY: ICD-10-CM

## 2017-10-19 DIAGNOSIS — Z00.00 HEALTHCARE MAINTENANCE: ICD-10-CM

## 2017-10-19 DIAGNOSIS — R63.5 ABNORMAL WEIGHT GAIN: ICD-10-CM

## 2017-10-19 DIAGNOSIS — L82.0 INFLAMED SEBORRHEIC KERATOSIS: ICD-10-CM

## 2017-10-19 LAB
BASOPHILS # BLD AUTO: 0.03 10*3/MM3 (ref 0–0.2)
BASOPHILS NFR BLD AUTO: 0.6 % (ref 0–2)
EOSINOPHIL # BLD AUTO: 0.17 10*3/MM3 (ref 0.1–0.3)
EOSINOPHIL NFR BLD AUTO: 3.3 % (ref 0–4)
ERYTHROCYTE [DISTWIDTH] IN BLOOD BY AUTOMATED COUNT: 12.3 % (ref 11.5–14.5)
HCT VFR BLD AUTO: 40 % (ref 37–47)
HGB BLD-MCNC: 13.7 G/DL (ref 12–16)
IMM GRANULOCYTES # BLD: 0 10*3/MM3 (ref 0–0.03)
IMM GRANULOCYTES NFR BLD: 0 % (ref 0–0.5)
LYMPHOCYTES # BLD AUTO: 1.54 10*3/MM3 (ref 0.6–4.8)
LYMPHOCYTES NFR BLD AUTO: 29.7 % (ref 20–45)
MCH RBC QN AUTO: 30.7 PG (ref 27–31)
MCHC RBC AUTO-ENTMCNC: 34.3 G/DL (ref 31–37)
MCV RBC AUTO: 89.7 FL (ref 81–99)
MONOCYTES # BLD AUTO: 0.46 10*3/MM3 (ref 0–1)
MONOCYTES NFR BLD AUTO: 8.9 % (ref 3–8)
NEUTROPHILS # BLD AUTO: 2.98 10*3/MM3 (ref 1.5–8.3)
NEUTROPHILS NFR BLD AUTO: 57.5 % (ref 45–70)
NRBC BLD AUTO-RTO: 0 /100 WBC (ref 0–0)
PLATELET # BLD AUTO: 282 10*3/MM3 (ref 140–500)
RBC # BLD AUTO: 4.46 10*6/MM3 (ref 4.2–5.4)
T4 FREE SERPL-MCNC: 0.99 NG/DL (ref 0.93–1.7)
TSH SERPL DL<=0.005 MIU/L-ACNC: 2.71 MIU/ML (ref 0.27–4.2)
WBC # BLD AUTO: 5.18 10*3/MM3 (ref 4.8–10.8)

## 2017-10-22 ENCOUNTER — RESULTS ENCOUNTER (OUTPATIENT)
Dept: INTERNAL MEDICINE | Facility: CLINIC | Age: 42
End: 2017-10-22

## 2017-10-22 DIAGNOSIS — E03.9 HYPOTHYROIDISM, ADULT: ICD-10-CM

## 2017-10-22 DIAGNOSIS — Z00.00 HEALTHCARE MAINTENANCE: ICD-10-CM

## 2017-10-23 ENCOUNTER — OFFICE VISIT (OUTPATIENT)
Dept: INTERNAL MEDICINE | Facility: CLINIC | Age: 42
End: 2017-10-23

## 2017-10-23 VITALS
DIASTOLIC BLOOD PRESSURE: 80 MMHG | SYSTOLIC BLOOD PRESSURE: 120 MMHG | WEIGHT: 147 LBS | HEIGHT: 65 IN | OXYGEN SATURATION: 98 % | TEMPERATURE: 99.2 F | BODY MASS INDEX: 24.49 KG/M2 | HEART RATE: 80 BPM

## 2017-10-23 DIAGNOSIS — R79.89 ABNORMAL THYROID BLOOD TEST: Primary | ICD-10-CM

## 2017-10-23 DIAGNOSIS — Z23 NEED FOR INFLUENZA VACCINATION: ICD-10-CM

## 2017-10-23 PROCEDURE — 99213 OFFICE O/P EST LOW 20 MIN: CPT | Performed by: NURSE PRACTITIONER

## 2017-10-23 PROCEDURE — 90686 IIV4 VACC NO PRSV 0.5 ML IM: CPT | Performed by: NURSE PRACTITIONER

## 2017-10-23 PROCEDURE — 90471 IMMUNIZATION ADMIN: CPT | Performed by: NURSE PRACTITIONER

## 2017-10-23 NOTE — PROGRESS NOTES
Chief Complaint   Patient presents with   • Sinusitis       Subjective     Elia Payne is a 41 y.o. female being seen for a follow up appointment today regarding thyroid disease. She was started on Synthroid 1 month ago, but she called because her hair was falling out, and we reduced it to 1/2 tablet daily (12.5mcgs). Her energy has improved.  .       History of Present Illness     No Known Allergies      Current Outpatient Prescriptions:   •  levonorgestrel (MIRENA, 52 MG,) 20 MCG/24HR IUD, 1 each by Intrauterine route 1 (One) Time for 1 dose., Disp: , Rfl: 0  •  levothyroxine (SYNTHROID, LEVOTHROID) 25 MCG tablet, Take 1/2 tab daily, Disp: 30 tablet, Rfl: 3    The following portions of the patient's history were reviewed and updated as appropriate: allergies, current medications, past family history, past medical history, past social history, past surgical history and problem list.    Review of Systems   Constitutional: Negative.    HENT: Negative.    Eyes: Negative.    Respiratory: Negative.    Cardiovascular: Negative.    Gastrointestinal: Negative.    Endocrine: Negative.    Genitourinary: Negative.    Musculoskeletal: Negative.    Allergic/Immunologic: Positive for environmental allergies.   Hematological: Negative.    Psychiatric/Behavioral: Negative.        Assessment     Physical Exam   Constitutional: She is oriented to person, place, and time. She appears well-developed and well-nourished.   Neck: Neck supple. No thyromegaly present.   Cardiovascular: Normal rate, regular rhythm and normal heart sounds.    No murmur heard.  Pulmonary/Chest: Effort normal and breath sounds normal. No respiratory distress. She has no wheezes.   Musculoskeletal: She exhibits no edema.   Neurological: She is alert and oriented to person, place, and time.   Psychiatric: She has a normal mood and affect. Her behavior is normal.   Vitals reviewed.      Plan     Her labs were reviewed with the patient from last week.      Elia was seen today for sinusitis.    Diagnoses and all orders for this visit:    Abnormal thyroid blood test  -     T4 & TSH ([facility]); Future  -     Thyroid Antibodies; Future      Low up in 3 months (labs only). CPE in 1 year.

## 2017-10-24 DIAGNOSIS — Z23 NEED FOR INFLUENZA VACCINATION: Primary | ICD-10-CM

## 2017-10-24 PROCEDURE — 90471 IMMUNIZATION ADMIN: CPT | Performed by: NURSE PRACTITIONER

## 2017-10-24 PROCEDURE — 90686 IIV4 VACC NO PRSV 0.5 ML IM: CPT | Performed by: NURSE PRACTITIONER

## 2017-12-12 ENCOUNTER — DOCUMENTATION (OUTPATIENT)
Dept: PHYSICAL THERAPY | Facility: HOSPITAL | Age: 42
End: 2017-12-12

## 2017-12-12 DIAGNOSIS — S82.841A BIMALLEOLAR ANKLE FRACTURE, RIGHT, CLOSED, INITIAL ENCOUNTER: Primary | ICD-10-CM

## 2017-12-12 NOTE — THERAPY TREATMENT NOTE
Outpatient Physical Therapy Ortho Treatment Note       Patient Name: Elia Payne  : 1975  MRN: [id number]  Today's Date: 2017      Visit Date: 2017    Visit Dx:    ICD-10-CM ICD-9-CM   1. Bimalleolar ankle fracture, right, closed, initial encounter S82.841A 824.4       Patient Active Problem List   Diagnosis   • Abnormal weight gain   • Anxiety, generalized   • Inflamed seborrheic keratosis   • Acute maxillary sinusitis   • Ankle fracture   • Bimalleolar ankle fracture   • Status post open reduction with internal fixation (ORIF) of fracture of ankle, right 2017   • Healthcare maintenance        Past Medical History:   Diagnosis Date   • Depression    • PONV (postoperative nausea and vomiting)         Past Surgical History:   Procedure Laterality Date   • ABDOMINAL SURGERY     • ANKLE OPEN REDUCTION INTERNAL FIXATION Right 2017    Procedure:  OPEN REDUCTION INTERNAL FIXATION right ankle fractures;  Surgeon: Mega Beck MD;  Location: [facility];  Service:    • ANKLE SURGERY      broken in    • BREAST SURGERY     • CHOLECYSTECTOMY     • COSMETIC SURGERY     • DILATATION AND CURETTAGE     • FRACTURE SURGERY  2017    right ankle           [employer] denied  [id number] for dates , , , , and 2017. The [employer] letter states : Documentation does not contain sufficient  description of the exercises performed. Manual therapy techniques were performed by PT during this patient's treatment s/p ORIF of bimalleolar ankle fracture. Manual retrograde massage to decrease ankle edema, improve pain and ankle/foot ROM and mobility were performed. Posterior talocrual joint mobilizations were performed to decrease pain and increase ankle dorsiflexion ROM for improved gait and ambulation. Tarsal, metatarsal, and calcaneal joint mobilizations were performed to improve ankle and foot ROM and mobility to improve shock absorption during ambulation.                                                             Time Calculation:                      Alfredo Lopez, PT  12/12/2017

## 2018-01-23 ENCOUNTER — RESULTS ENCOUNTER (OUTPATIENT)
Dept: INTERNAL MEDICINE | Facility: CLINIC | Age: 43
End: 2018-01-23

## 2018-01-23 DIAGNOSIS — R79.89 ABNORMAL THYROID BLOOD TEST: ICD-10-CM

## 2018-02-16 LAB
T4 SERPL-MCNC: 5.81 MCG/DL (ref 4.5–11.7)
THYROGLOB AB SERPL-ACNC: 1.5 IU/ML (ref 0–0.9)
THYROPEROXIDASE AB SERPL-ACNC: 25 IU/ML (ref 0–34)
TSH SERPL DL<=0.005 MIU/L-ACNC: 3.91 MIU/ML (ref 0.27–4.2)

## 2018-02-21 ENCOUNTER — OFFICE VISIT (OUTPATIENT)
Dept: INTERNAL MEDICINE | Facility: CLINIC | Age: 43
End: 2018-02-21

## 2018-02-21 VITALS
HEART RATE: 67 BPM | TEMPERATURE: 97.8 F | OXYGEN SATURATION: 97 % | SYSTOLIC BLOOD PRESSURE: 120 MMHG | HEIGHT: 65 IN | BODY MASS INDEX: 24.66 KG/M2 | DIASTOLIC BLOOD PRESSURE: 62 MMHG | WEIGHT: 148 LBS

## 2018-02-21 DIAGNOSIS — L03.031 ONYCHIA, TOE, RIGHT: ICD-10-CM

## 2018-02-21 DIAGNOSIS — E06.3 HASHIMOTO'S THYROIDITIS: Primary | ICD-10-CM

## 2018-02-21 PROCEDURE — 99213 OFFICE O/P EST LOW 20 MIN: CPT | Performed by: NURSE PRACTITIONER

## 2018-02-21 RX ORDER — TERBINAFINE HYDROCHLORIDE 250 MG/1
250 TABLET ORAL DAILY
Qty: 90 TABLET | Refills: 0 | Status: SHIPPED | OUTPATIENT
Start: 2018-02-21 | End: 2018-09-11

## 2018-02-21 RX ORDER — LEVOTHYROXINE SODIUM 0.03 MG/1
12.5 TABLET ORAL DAILY
Qty: 30 TABLET | Refills: 2 | Status: SHIPPED | OUTPATIENT
Start: 2018-02-21 | End: 2018-03-12 | Stop reason: SINTOL

## 2018-02-21 NOTE — PROGRESS NOTES
"Chief Complaint   Patient presents with   • Follow-up     Lab results    • Hypothyroidism       Subjective     Elia Payne is a 42 y.o. female being seen for a follow up appointment today regarding hypothyroidism. She had stopped her Levothyroxine due to \"hair falling out.\" She has noticed a difficult time maintaining weight, fatigue, and dry skin. SHe reports \"I wasn't sure if it was winter.\"    Second complaint of thicken toe nails. Used Lamisil in the past.       History of Present Illness     No Known Allergies      Current Outpatient Prescriptions:   •  levonorgestrel (MIRENA) 20 MCG/24HR IUD, by Intrauterine route., Disp: , Rfl:     The following portions of the patient's history were reviewed and updated as appropriate: allergies, current medications, past family history, past medical history, past social history, past surgical history and problem list.    Review of Systems   Constitutional: Positive for unexpected weight change.   HENT: Negative.    Eyes: Negative.    Respiratory: Negative.    Cardiovascular: Negative.  Negative for chest pain and leg swelling.   Gastrointestinal: Negative.    Endocrine: Negative.    Musculoskeletal: Positive for arthralgias (right ankle).   Allergic/Immunologic: Negative.    Neurological: Positive for numbness.   Hematological: Negative.    Psychiatric/Behavioral: Negative.        Assessment     Physical Exam   Constitutional: She is oriented to person, place, and time. She appears well-developed and well-nourished.   HENT:   Head: Normocephalic.   Right Ear: External ear normal.   Left Ear: External ear normal.   Nose: Nose normal.   Mouth/Throat: Oropharynx is clear and moist. No oropharyngeal exudate.   Neck: Neck supple. No thyromegaly present.   Cardiovascular: Normal rate, regular rhythm and normal heart sounds.    No murmur heard.  Pulmonary/Chest: Effort normal and breath sounds normal. No respiratory distress. She has no wheezes.    Diabetic foot exam " performed: Right toenails with onychomycosis    During the foot exam she had a monofilament test not performed.  Neurological: She is alert and oriented to person, place, and time.   Psychiatric: She has a normal mood and affect. Her behavior is normal.   Vitals reviewed.      Teodora Rodrigez was seen today for follow-up and hypothyroidism.    Diagnoses and all orders for this visit:    Hashimoto's thyroiditis  -     levothyroxine (SYNTHROID, LEVOTHROID) 25 MCG tablet; Take 0.5 tablets by mouth Daily.  -     T4 & TSH ([facility]); Future    Onychia, toe, right  -     terbinafine (LAMISIL) 250 MG tablet; Take 1 tablet by mouth Daily.

## 2018-02-21 NOTE — PATIENT INSTRUCTIONS
Hypothyroidism  Hypothyroidism is a disorder of the thyroid. The thyroid is a large gland that is located in the lower front of the neck. The thyroid releases hormones that control how the body works. With hypothyroidism, the thyroid does not make enough of these hormones.  What are the causes?  Causes of hypothyroidism may include:  Viral infections.  Pregnancy.  Your own defense system (immune system) attacking your thyroid.  Certain medicines.  Birth defects.  Past radiation treatments to your head or neck.  Past treatment with radioactive iodine.  Past surgical removal of part or all of your thyroid.  Problems with the gland that is located in the center of your brain (pituitary).  What are the signs or symptoms?  Signs and symptoms of hypothyroidism may include:  Feeling as though you have no energy (lethargy).  Inability to tolerate cold.  Weight gain that is not explained by a change in diet or exercise habits.  Dry skin.  Coarse hair.  Menstrual irregularity.  Slowing of thought processes.  Constipation.  Sadness or depression.  How is this diagnosed?  Your health care provider may diagnose hypothyroidism with blood tests and ultrasound tests.  How is this treated?  Hypothyroidism is treated with medicine that replaces the hormones that your body does not make. After you begin treatment, it may take several weeks for symptoms to go away.  Follow these instructions at home:  Take medicines only as directed by your health care provider.  If you start taking any new medicines, tell your health care provider.  Keep all follow-up visits as directed by your health care provider. This is important. As your condition improves, your dosage needs may change. You will need to have blood tests regularly so that your health care provider can watch your condition.  Contact a health care provider if:  Your symptoms do not get better with treatment.  You are taking thyroid replacement medicine and:  You sweat  excessively.  You have tremors.  You feel anxious.  You lose weight rapidly.  You cannot tolerate heat.  You have emotional swings.  You have diarrhea.  You feel weak.  Get help right away if:  You develop chest pain.  You develop an irregular heartbeat.  You develop a rapid heartbeat.  This information is not intended to replace advice given to you by your health care provider. Make sure you discuss any questions you have with your health care provider.  Document Released: 12/18/2006 Document Revised: 05/25/2017 Document Reviewed: 05/05/2015  [employer] Interactive Patient Education © 2017 [employer]  Antithyroid Peroxidase Antibody Test  Why am I having this test?  This test is used for diagnosing different thyroid diseases. Your health care provider may perform this test along with other thyroid antibody tests to aid in specific diagnoses.  What kind of sample is taken?  A blood sample is required for this test. It is usually collected by inserting a needle into a vein.  How do I prepare for this test?  There is no preparation required for this test.  What are the reference ranges?  Reference ranges are considered healthy ranges established after testing a large group of healthy people. Reference ranges may vary among different people, labs, and hospitals. It is your responsibility to obtain your test results. Ask the lab or department performing the test when and how you will get your results.  Reference ranges are as follows:  · Less than 9 international units/mL for all ages.  What do the results mean?  Increased levels of antithyroid peroxidase antibody may indicate:  · Hashimoto thyroiditis.  · Rheumatoid arthritis (RA).  · Hypothyroidism.  · Thyroid cancer.  Talk with your health care provider to discuss your results, treatment options, and if necessary, the need for more tests. Talk with your health care provider if you have any questions about your results.  Talk with your health care provider to discuss  your results, treatment options, and if necessary, the need for more tests. Talk with your health care provider if you have any questions about your results.  This information is not intended to replace advice given to you by your health care provider. Make sure you discuss any questions you have with your health care provider.  Document Released: 01/11/2006 Document Revised: 08/21/2017 Document Reviewed: 06/10/2015  [employer] Interactive Patient Education © 2017 [employer]

## 2018-03-06 ENCOUNTER — OFFICE VISIT (OUTPATIENT)
Dept: INTERNAL MEDICINE | Facility: CLINIC | Age: 43
End: 2018-03-06

## 2018-03-06 VITALS
WEIGHT: 152 LBS | RESPIRATION RATE: 16 BRPM | HEART RATE: 74 BPM | BODY MASS INDEX: 25.33 KG/M2 | TEMPERATURE: 98.3 F | OXYGEN SATURATION: 97 % | DIASTOLIC BLOOD PRESSURE: 72 MMHG | HEIGHT: 65 IN | SYSTOLIC BLOOD PRESSURE: 120 MMHG

## 2018-03-06 DIAGNOSIS — J06.9 VIRAL URI WITH COUGH: Primary | ICD-10-CM

## 2018-03-06 LAB
EXPIRATION DATE: NORMAL
EXPIRATION DATE: NORMAL
FLUAV AG NPH QL: NORMAL
FLUBV AG NPH QL: NORMAL
INTERNAL CONTROL: NORMAL
INTERNAL CONTROL: NORMAL
Lab: NORMAL
Lab: NORMAL
S PYO AG THROAT QL: NEGATIVE

## 2018-03-06 PROCEDURE — 99212 OFFICE O/P EST SF 10 MIN: CPT | Performed by: NURSE PRACTITIONER

## 2018-03-06 PROCEDURE — 87804 INFLUENZA ASSAY W/OPTIC: CPT | Performed by: NURSE PRACTITIONER

## 2018-03-06 PROCEDURE — 87880 STREP A ASSAY W/OPTIC: CPT | Performed by: NURSE PRACTITIONER

## 2018-03-06 NOTE — PROGRESS NOTES
Chief Complaint   Patient presents with   • Nausea   • Cough       Subjective   Elia Payne is a 42 y.o. female is being seen for an acute appointment for cough and nausea since last night. Associated nasal congestion,, rhinnorhea. She took [facility] cough and congestin last night and woke up with anxiety and heart palpitations. She was concerned that she had an interaction with Synthroid. Her son had a cold, and she thinks she may have caught it. No fever, no SOA.     History of Present Illness     Current Outpatient Prescriptions on File Prior to Visit   Medication Sig Dispense Refill   • levonorgestrel (MIRENA) 20 MCG/24HR IUD by Intrauterine route.     • levothyroxine (SYNTHROID, LEVOTHROID) 25 MCG tablet Take 0.5 tablets by mouth Daily. 30 tablet 2   • terbinafine (LAMISIL) 250 MG tablet Take 1 tablet by mouth Daily. 90 tablet 0     No current facility-administered medications on file prior to visit.        The following portions of the patient's history were reviewed and updated as appropriate: allergies, current medications, past family history, past medical history, past social history, past surgical history and problem list.    Review of Systems   Constitutional: Negative.  Negative for fever and unexpected weight change.   HENT: Positive for congestion, postnasal drip and rhinorrhea.    Eyes: Negative.    Respiratory: Negative for shortness of breath.    Cardiovascular: Positive for palpitations. Negative for chest pain and leg swelling.   Gastrointestinal: Negative.    Endocrine: Negative.    Musculoskeletal: Negative.    Allergic/Immunologic: Negative.    Neurological: Negative.    Hematological: Negative.    Psychiatric/Behavioral: Negative.        Objective   Physical Exam   Constitutional: She is oriented to person, place, and time. She appears well-developed and well-nourished.   HENT:   Right Ear: Tympanic membrane normal.   Left Ear: Tympanic membrane normal.   Nose: Mucosal edema and  rhinorrhea present. Right sinus exhibits no maxillary sinus tenderness and no frontal sinus tenderness. Left sinus exhibits no maxillary sinus tenderness and no frontal sinus tenderness.   Neck: Neck supple. No thyromegaly present.   Cardiovascular: Normal rate, regular rhythm and normal heart sounds.    No murmur heard.  Pulmonary/Chest: Effort normal and breath sounds normal. No respiratory distress. She has no wheezes.   Musculoskeletal: She exhibits no edema.   Neurological: She is alert and oriented to person, place, and time.   Psychiatric: She has a normal mood and affect. Her behavior is normal.   Vitals reviewed.      Assessment/Plan   Elia was seen today for nausea and cough.    Diagnoses and all orders for this visit:    Viral URI with cough    She will treat viral symptoms with rest and fluids. Avoid decongestants due to increased hear rate after taking them. Follow up in 2 weeks for thyroid check.

## 2018-03-06 NOTE — PATIENT INSTRUCTIONS

## 2018-03-07 ENCOUNTER — TELEPHONE (OUTPATIENT)
Dept: INTERNAL MEDICINE | Facility: CLINIC | Age: 43
End: 2018-03-07

## 2018-03-07 NOTE — TELEPHONE ENCOUNTER
LM on patient's VM as per below.    ----- Message from TED Thompson sent at 3/7/2018 10:20 AM EST -----  Regarding: FW: UPDATE  Contact: [phone]  Break it in half again for 6.25mg dose. Take in the morning on empty stomach.   ----- Message -----     From: Portia Fletcher MA     Sent: 3/7/2018   9:40 AM       To: TED Thompson  Subject: FW: UPDATE                                       Spoke with patient.  She is currently only taking 12.5 mg daily but has developed tachycardia after 2 weeks' use.  However, she does have more energy and does not want to go off it completely.  She has a pill cutter and was wondering about taking 6.25?  ----- Message -----     From: Portia Fletcher MA     Sent: 3/7/2018   9:03 AM       To: Portia Fletcher MA  Subject: FW: UPDATE                                           ----- Message -----     From: Faith Devries     Sent: 3/7/2018   8:39 AM       To: [facility]  Subject: UPDATE                                           :  71  JORGE L PT.    PATIENT SAW JORGE L YESTERDAY AND DISCUSSED DIFFICULTY SLEEPING. SHE HAD DIFFICULTY LAST NIGHT SLEEPING AND QUESTIONS THE THYROID MEDICINE.

## 2018-03-09 ENCOUNTER — TELEPHONE (OUTPATIENT)
Dept: INTERNAL MEDICINE | Facility: CLINIC | Age: 43
End: 2018-03-09

## 2018-03-12 ENCOUNTER — TELEPHONE (OUTPATIENT)
Dept: INTERNAL MEDICINE | Facility: CLINIC | Age: 43
End: 2018-03-12

## 2018-03-12 RX ORDER — LEVOTHYROXINE AND LIOTHYRONINE 9.5; 2.25 UG/1; UG/1
TABLET ORAL
Qty: 15 TABLET | Refills: 0 | Status: SHIPPED | OUTPATIENT
Start: 2018-03-12 | End: 2018-04-11 | Stop reason: SDUPTHER

## 2018-03-12 NOTE — TELEPHONE ENCOUNTER
SENT TO PHARMACY    LEFT MESSAGE FOR PT          ----- Message from TED Thompson sent at 3/12/2018  8:44 AM EDT -----  15mg (1/2 tablet daily to equal 7.5mg)   ----- Message -----  From: Nicol Caldwell MA  Sent: 3/12/2018   8:33 AM  To: TED Thompson    What dose of  [place] thyroid  ----- Message -----  From: TED Thompson  Sent: 3/12/2018   7:37 AM  To: Nicol Caldwell MA    Stop Synthroid.    Try [place] thyroid  Si/2 tablet daily  Disp#15    Follow up in 4 weeks  ----- Message -----  From: Portia Fletcher MA  Sent: 3/9/2018   9:46 AM  To: TED Thompson    Spoke with patient.  Still with tachycardia and insomnia on the 6.25 of Synthroid.  She is wondering about different hypothyroid med.

## 2018-04-11 RX ORDER — THYROID,PORK 15 MG
TABLET ORAL
Qty: 15 TABLET | Refills: 0 | Status: SHIPPED | OUTPATIENT
Start: 2018-04-11 | End: 2018-04-16 | Stop reason: SINTOL

## 2018-04-12 LAB
ALBUMIN SERPL-MCNC: 4.4 G/DL (ref 3.5–5.2)
ALBUMIN/GLOB SERPL: 1.6 G/DL
ALP SERPL-CCNC: 83 U/L (ref 40–129)
ALT SERPL-CCNC: 14 U/L (ref 5–33)
AST SERPL-CCNC: 16 U/L (ref 5–32)
BASOPHILS # BLD AUTO: 0.04 10*3/MM3 (ref 0–0.2)
BASOPHILS NFR BLD AUTO: 1 % (ref 0–2)
BILIRUB SERPL-MCNC: 0.5 MG/DL (ref 0.2–1.2)
BUN SERPL-MCNC: 7 MG/DL (ref 6–20)
BUN/CREAT SERPL: 11.1 (ref 7–25)
CALCIUM SERPL-MCNC: 9.1 MG/DL (ref 8.6–10.5)
CHLORIDE SERPL-SCNC: 103 MMOL/L (ref 98–107)
CHOLEST SERPL-MCNC: 156 MG/DL (ref 0–200)
CO2 SERPL-SCNC: 30.2 MMOL/L (ref 22–29)
CREAT SERPL-MCNC: 0.63 MG/DL (ref 0.57–1)
EOSINOPHIL # BLD AUTO: 0.08 10*3/MM3 (ref 0.1–0.3)
EOSINOPHIL NFR BLD AUTO: 2 % (ref 0–4)
ERYTHROCYTE [DISTWIDTH] IN BLOOD BY AUTOMATED COUNT: 13.2 % (ref 11.5–14.5)
GFR SERPLBLD CREATININE-BSD FMLA CKD-EPI: 103 ML/MIN/1.73
GFR SERPLBLD CREATININE-BSD FMLA CKD-EPI: 124 ML/MIN/1.73
GLOBULIN SER CALC-MCNC: 2.7 GM/DL
GLUCOSE SERPL-MCNC: 81 MG/DL (ref 65–99)
HCT VFR BLD AUTO: 39.5 % (ref 37–47)
HDLC SERPL-MCNC: 82 MG/DL (ref 40–60)
HGB BLD-MCNC: 13.4 G/DL (ref 12–16)
IMM GRANULOCYTES # BLD: 0 10*3/MM3 (ref 0–0.03)
IMM GRANULOCYTES NFR BLD: 0 % (ref 0–0.5)
LDLC SERPL CALC-MCNC: 64 MG/DL (ref 0–100)
LDLC/HDLC SERPL: 0.78 {RATIO}
LYMPHOCYTES # BLD AUTO: 1.1 10*3/MM3 (ref 0.6–4.8)
LYMPHOCYTES NFR BLD AUTO: 27.7 % (ref 20–45)
MCH RBC QN AUTO: 30.1 PG (ref 27–31)
MCHC RBC AUTO-ENTMCNC: 33.9 G/DL (ref 31–37)
MCV RBC AUTO: 88.8 FL (ref 81–99)
MONOCYTES # BLD AUTO: 0.4 10*3/MM3 (ref 0–1)
MONOCYTES NFR BLD AUTO: 10.1 % (ref 3–8)
NEUTROPHILS # BLD AUTO: 2.35 10*3/MM3 (ref 1.5–8.3)
NEUTROPHILS NFR BLD AUTO: 59.2 % (ref 45–70)
NRBC BLD AUTO-RTO: 0 /100 WBC (ref 0–0)
PLATELET # BLD AUTO: 258 10*3/MM3 (ref 140–500)
POTASSIUM SERPL-SCNC: 4.4 MMOL/L (ref 3.5–5.2)
PROT SERPL-MCNC: 7.1 G/DL (ref 6–8.5)
RBC # BLD AUTO: 4.45 10*6/MM3 (ref 4.2–5.4)
SODIUM SERPL-SCNC: 141 MMOL/L (ref 136–145)
T4 FREE SERPL-MCNC: 1.12 NG/DL (ref 0.93–1.7)
TRIGL SERPL-MCNC: 50 MG/DL (ref 0–150)
TSH SERPL DL<=0.005 MIU/L-ACNC: 5.86 MIU/ML (ref 0.27–4.2)
VLDLC SERPL CALC-MCNC: 10 MG/DL (ref 7–27)
WBC # BLD AUTO: 3.97 10*3/MM3 (ref 4.8–10.8)

## 2018-04-16 ENCOUNTER — OFFICE VISIT (OUTPATIENT)
Dept: INTERNAL MEDICINE | Facility: CLINIC | Age: 43
End: 2018-04-16

## 2018-04-16 VITALS
BODY MASS INDEX: 25.33 KG/M2 | DIASTOLIC BLOOD PRESSURE: 68 MMHG | HEART RATE: 65 BPM | SYSTOLIC BLOOD PRESSURE: 114 MMHG | OXYGEN SATURATION: 98 % | HEIGHT: 65 IN | TEMPERATURE: 98.1 F | WEIGHT: 152 LBS | RESPIRATION RATE: 16 BRPM

## 2018-04-16 DIAGNOSIS — E06.3 HASHIMOTO'S THYROIDITIS: Primary | ICD-10-CM

## 2018-04-16 DIAGNOSIS — R00.2 HEART PALPITATIONS: ICD-10-CM

## 2018-04-16 PROBLEM — J06.9 VIRAL URI WITH COUGH: Status: RESOLVED | Noted: 2018-03-06 | Resolved: 2018-04-16

## 2018-04-16 PROCEDURE — 99213 OFFICE O/P EST LOW 20 MIN: CPT | Performed by: NURSE PRACTITIONER

## 2018-04-16 NOTE — PROGRESS NOTES
"No chief complaint on file.      Subjective     Elia Payne is a 42 y.o. female being seen for a follow up appointment today regarding Hashmoto's thyroiditis.. She was started on Levothyroxine 9-, but her \"hair was falling out\" so she quit taking it. She became euthyroid on the levothyroxine. Thyroid antibodies were drawn 2- to confirm Hashimoto's thyroiditis.  Then, she was switched to Amour thyroid 15 mg, which started causing heart palpitations and insomnia. So, She reduced the dose to 1/2 tablet daily. She stopped it Saturday after increasing palpitations.She has a family history of MVP, and CAD.       History of Present Illness     Allergies   Allergen Reactions   • Delsym Cgh-Chest Rodo Dm Child [Dextromethorphan-Guaifenesin] Nausea Only   • Synthroid [Levothyroxine Sodium] Other (See Comments)     TACHYCARDIA AND INSOMNIA         Current Outpatient Prescriptions:   •  ARMOUR THYROID 15 MG tablet, TAKE ONE-HALF TABLET BY MOUTH DAILY, Disp: 15 tablet, Rfl: 0  •  Dextromethorphan-Guaifenesin 5-100 MG/5ML liquid, Take  by mouth., Disp: , Rfl:   •  levonorgestrel (MIRENA) 20 MCG/24HR IUD, by Intrauterine route., Disp: , Rfl:   •  terbinafine (LAMISIL) 250 MG tablet, Take 1 tablet by mouth Daily., Disp: 90 tablet, Rfl: 0    The following portions of the patient's history were reviewed and updated as appropriate: allergies, current medications, past family history, past medical history, past social history, past surgical history and problem list.    Review of Systems   Constitutional: Negative.  Negative for fever.   HENT: Negative.    Respiratory: Negative for choking and chest tightness.    Cardiovascular: Positive for palpitations. Negative for chest pain and leg swelling.   Gastrointestinal: Negative.    Endocrine: Negative.    Genitourinary: Negative.    Musculoskeletal: Negative.    Skin: Negative.    Allergic/Immunologic: Negative.    Neurological: Negative.    Hematological: Negative. "    Psychiatric/Behavioral: Negative.        Assessment     Physical Exam   Constitutional: She is oriented to person, place, and time. She appears well-developed and well-nourished.   Neck: Neck supple. No thyromegaly present.   Cardiovascular: Normal rate, regular rhythm and normal heart sounds.    No murmur heard.  Pulmonary/Chest: Effort normal and breath sounds normal. No respiratory distress. She has no wheezes.   Musculoskeletal: She exhibits no edema.   Neurological: She is alert and oriented to person, place, and time.   Skin: Skin is warm and dry.   Psychiatric: She has a normal mood and affect. Her behavior is normal.   Vitals reviewed.      Plan     Her fasting labs were reviewed with the patient from last week.     Elia was seen today for follow-up.    Diagnoses and all orders for this visit:    Hashimoto's thyroiditis  -     Ambulatory Referral to Endocrinology    Heart palpitations  -     Adult Transthoracic Echo Complete W/ Cont if Necessary Per Protocol; Future    Stop Amour thyroid.    Reviewed labs from the last 6 months including thyroid antibodies, TSH and free t4.     We discussed a watch and wait for thyroid level. Due to weight difficulties, fatigue she would like to proceed with endo referral.

## 2018-05-24 ENCOUNTER — RESULTS ENCOUNTER (OUTPATIENT)
Dept: INTERNAL MEDICINE | Facility: CLINIC | Age: 43
End: 2018-05-24

## 2018-05-24 DIAGNOSIS — E06.3 HASHIMOTO'S THYROIDITIS: ICD-10-CM

## 2018-06-13 ENCOUNTER — OFFICE VISIT (OUTPATIENT)
Dept: ENDOCRINOLOGY | Age: 43
End: 2018-06-13

## 2018-06-13 VITALS
BODY MASS INDEX: 25.12 KG/M2 | RESPIRATION RATE: 16 BRPM | SYSTOLIC BLOOD PRESSURE: 114 MMHG | DIASTOLIC BLOOD PRESSURE: 72 MMHG | WEIGHT: 150.8 LBS | HEIGHT: 65 IN

## 2018-06-13 DIAGNOSIS — R63.5 ABNORMAL WEIGHT GAIN: ICD-10-CM

## 2018-06-13 DIAGNOSIS — E03.9 PRIMARY HYPOTHYROIDISM: Primary | ICD-10-CM

## 2018-06-13 DIAGNOSIS — R53.82 CHRONIC FATIGUE: ICD-10-CM

## 2018-06-13 DIAGNOSIS — O24.419 GESTATIONAL DIABETES MELLITUS (GDM), ANTEPARTUM, GESTATIONAL DIABETES METHOD OF CONTROL UNSPECIFIED: ICD-10-CM

## 2018-06-13 DIAGNOSIS — E06.3 HASHIMOTO'S THYROIDITIS: ICD-10-CM

## 2018-06-13 PROCEDURE — 99204 OFFICE O/P NEW MOD 45 MIN: CPT | Performed by: INTERNAL MEDICINE

## 2018-06-13 RX ORDER — LEVOTHYROXINE SODIUM 75 UG/1
75 CAPSULE ORAL DAILY
Qty: 30 CAPSULE | Refills: 5 | Status: SHIPPED | OUTPATIENT
Start: 2018-06-13 | End: 2018-09-11

## 2018-06-13 NOTE — PROGRESS NOTES
"Maryann Payne is a 42 y.o. female seen as a new patient for hashimoto's. She has taken Levothyroxine 25 mcg and [place] 15 mg in the past but has been off thyroid medication x 2-3 months. She denies any problems or concerns.     History of Present Illness this is a 42-year-old female who has been referred for further evaluation and treatment of Hashimoto and recent onset of hypothyroidism.  She said the on the 10/19/2017 she was given 25 µg of levothyroxin which caused her to be agitated and hyperactive with a rapid heart rate and loss of sleep.  However on 04/12/2018 she was given the same dose and this time it caused her to be completely list less tired and lethargic.  She has not taken any thyroid preparation for 6-8 weeks.  In the past 6 months she has gained 12 pounds.  The most significant part in her past history is gestational diabetes which according to her went away after she gave birth to her child.  Her menstrual cycle is very irregular and she is taking Mirena for birth control as well as to regulate her menstrual cycle.    /72   Resp 16   Ht 165.1 cm (65\")   Wt 68.4 kg (150 lb 12.8 oz)   BMI 25.09 kg/m²      Allergies   Allergen Reactions   • Delsym Cgh-Chest Rodo Dm Child [Dextromethorphan-Guaifenesin] Nausea Only   • Synthroid [Levothyroxine Sodium] Other (See Comments)     TACHYCARDIA AND INSOMNIA       Current Outpatient Prescriptions:   •  levonorgestrel (MIRENA) 20 MCG/24HR IUD, by Intrauterine route., Disp: , Rfl:   •  terbinafine (LAMISIL) 250 MG tablet, Take 1 tablet by mouth Daily., Disp: 90 tablet, Rfl: 0      The following portions of the patient's history were reviewed and updated as appropriate: allergies, current medications, past family history, past medical history, past social history, past surgical history and problem list.    Review of Systems   Constitutional: Negative.    HENT: Negative.    Eyes: Negative.    Respiratory: Negative.    Cardiovascular: " Negative.    Gastrointestinal: Negative.    Endocrine: Negative.    Genitourinary: Negative.    Musculoskeletal: Negative.    Skin: Negative.    Allergic/Immunologic: Negative.    Neurological: Negative.    Hematological: Negative.    Psychiatric/Behavioral: Negative.        Objective   Physical Exam   Constitutional: She is oriented to person, place, and time. She appears well-developed and well-nourished. No distress.   HENT:   Head: Normocephalic and atraumatic.   Right Ear: External ear normal.   Left Ear: External ear normal.   Nose: Nose normal.   Mouth/Throat: Oropharynx is clear and moist. No oropharyngeal exudate.   Eyes: Conjunctivae and EOM are normal. Pupils are equal, round, and reactive to light. Right eye exhibits no discharge. Left eye exhibits no discharge. No scleral icterus.   Neck: Normal range of motion. Neck supple. No JVD present. No tracheal deviation present. No thyromegaly present.   Cardiovascular: Normal rate, regular rhythm, normal heart sounds and intact distal pulses.  Exam reveals no gallop and no friction rub.    No murmur heard.  Pulmonary/Chest: Effort normal and breath sounds normal. No stridor. No respiratory distress. She has no wheezes. She has no rales. She exhibits no tenderness.   Abdominal: Soft. Bowel sounds are normal. She exhibits no distension and no mass. There is no tenderness. There is no rebound and no guarding. No hernia.   Musculoskeletal: Normal range of motion. She exhibits no edema, tenderness or deformity.   Lymphadenopathy:     She has no cervical adenopathy.   Neurological: She is alert and oriented to person, place, and time. She has normal reflexes. She displays normal reflexes. No cranial nerve deficit or sensory deficit. She exhibits normal muscle tone. Coordination normal.   Skin: Skin is warm and dry. No rash noted. She is not diaphoretic. No erythema. No pallor.   Psychiatric: She has a normal mood and affect. Her behavior is normal. Judgment and  thought content normal.   Nursing note and vitals reviewed.        Assessment/Plan   Diagnoses and all orders for this visit:    Primary hypothyroidism  -     T3, Free  -     T4 & TSH ([facility])  -     T4, Free  -     Thyroglobulin With Anti-TG  -     Uric Acid  -     Vitamin D 25 Hydroxy  -     Comprehensive Metabolic Panel  -     C-Peptide  -     Hemoglobin A1c  -     Lipid Panel  -     Prolactin  -     ACTH  -     Cortisol  -     T3, Free; Future  -     T4 & TSH ([facility]); Future  -     T4, Free; Future  -     Thyroglobulin With Anti-TG; Future  -     Uric Acid; Future  -     Vitamin D 25 Hydroxy; Future  -     Comprehensive Metabolic Panel; Future  -     C-Peptide; Future  -     Hemoglobin A1c; Future  -     Lipid Panel; Future    Hashimoto's thyroiditis  -     T3, Free  -     T4 & TSH ([facility])  -     T4, Free  -     Thyroglobulin With Anti-TG  -     Uric Acid  -     Vitamin D 25 Hydroxy  -     Comprehensive Metabolic Panel  -     C-Peptide  -     Hemoglobin A1c  -     Lipid Panel  -     Prolactin  -     ACTH  -     Cortisol  -     T3, Free; Future  -     T4 & TSH ([facility]); Future  -     T4, Free; Future  -     Thyroglobulin With Anti-TG; Future  -     Uric Acid; Future  -     Vitamin D 25 Hydroxy; Future  -     Comprehensive Metabolic Panel; Future  -     C-Peptide; Future  -     Hemoglobin A1c; Future  -     Lipid Panel; Future    Abnormal weight gain  -     T3, Free  -     T4 & TSH ([facility])  -     T4, Free  -     Thyroglobulin With Anti-TG  -     Uric Acid  -     Vitamin D 25 Hydroxy  -     Comprehensive Metabolic Panel  -     C-Peptide  -     Hemoglobin A1c  -     Lipid Panel  -     Prolactin  -     ACTH  -     Cortisol  -     T3, Free; Future  -     T4 & TSH ([facility]); Future  -     T4, Free; Future  -     Thyroglobulin With Anti-TG; Future  -     Uric Acid; Future  -     Vitamin D 25 Hydroxy; Future  -     Comprehensive Metabolic Panel; Future  -     C-Peptide; Future  -     Hemoglobin A1c; Future  -      Lipid Panel; Future    Chronic fatigue  -     T3, Free  -     T4 & TSH ([facility])  -     T4, Free  -     Thyroglobulin With Anti-TG  -     Uric Acid  -     Vitamin D 25 Hydroxy  -     Comprehensive Metabolic Panel  -     C-Peptide  -     Hemoglobin A1c  -     Lipid Panel  -     Prolactin  -     ACTH  -     Cortisol  -     T3, Free; Future  -     T4 & TSH ([facility]); Future  -     T4, Free; Future  -     Thyroglobulin With Anti-TG; Future  -     Uric Acid; Future  -     Vitamin D 25 Hydroxy; Future  -     Comprehensive Metabolic Panel; Future  -     C-Peptide; Future  -     Hemoglobin A1c; Future  -     Lipid Panel; Future    Gestational diabetes mellitus (GDM), antepartum, gestational diabetes method of control unspecified  -     T3, Free  -     T4 & TSH ([facility])  -     T4, Free  -     Thyroglobulin With Anti-TG  -     Uric Acid  -     Vitamin D 25 Hydroxy  -     Comprehensive Metabolic Panel  -     C-Peptide  -     Hemoglobin A1c  -     Lipid Panel  -     Prolactin  -     ACTH  -     Cortisol  -     T3, Free; Future  -     T4 & TSH ([facility]); Future  -     T4, Free; Future  -     Thyroglobulin With Anti-TG; Future  -     Uric Acid; Future  -     Vitamin D 25 Hydroxy; Future  -     Comprehensive Metabolic Panel; Future  -     C-Peptide; Future  -     Hemoglobin A1c; Future  -     Lipid Panel; Future    Other orders  -     TIROSINT 75 MCG capsule; Take 1 capsule by mouth Daily.               In summary I saw and examined this 42-year-old female for above-mentioned problems.  I reviewed her laboratory evaluations of 10/19/2017 and 02/15/2018 and finally 04/12/2018 where it shows a progressive rise in her TSH.  Towards becoming hypothyroid.  At this time I am going to go ahead and order a more extensive laboratory evaluation and once the results come back we will go ahead and call for any possible modification or new medications.  Because of her intolerance towards generic levothyroxin I am going to go ahead and started  on samples of  Tirosint 75 µg daily and if tolerated we will send a prescription as well.  She will see Ms. Shelby Meza in 6 months or sooner if needed with laboratory evaluation prior to each office visit.

## 2018-06-19 LAB
25(OH)D3+25(OH)D2 SERPL-MCNC: 40.9 NG/ML (ref 30–100)
ACTH PLAS-MCNC: 17.3 PG/ML (ref 7.2–63.3)
ALBUMIN SERPL-MCNC: 4.6 G/DL (ref 3.5–5.2)
ALBUMIN/GLOB SERPL: 1.6 G/DL
ALP SERPL-CCNC: 85 U/L (ref 39–117)
ALT SERPL-CCNC: 10 U/L (ref 1–33)
AST SERPL-CCNC: 11 U/L (ref 1–32)
BILIRUB SERPL-MCNC: 0.4 MG/DL (ref 0.1–1.2)
BUN SERPL-MCNC: 11 MG/DL (ref 6–20)
BUN/CREAT SERPL: 14.5 (ref 7–25)
C PEPTIDE SERPL-MCNC: 1.5 NG/ML (ref 1.1–4.4)
CALCIUM SERPL-MCNC: 9.9 MG/DL (ref 8.6–10.5)
CHLORIDE SERPL-SCNC: 103 MMOL/L (ref 98–107)
CHOLEST SERPL-MCNC: 148 MG/DL (ref 0–200)
CO2 SERPL-SCNC: 30 MMOL/L (ref 22–29)
CORTIS SERPL-MCNC: 9.7 UG/DL
CREAT SERPL-MCNC: 0.76 MG/DL (ref 0.57–1)
GFR SERPLBLD CREATININE-BSD FMLA CKD-EPI: 100 ML/MIN/1.73
GFR SERPLBLD CREATININE-BSD FMLA CKD-EPI: 83 ML/MIN/1.73
GLOBULIN SER CALC-MCNC: 2.9 GM/DL
GLUCOSE SERPL-MCNC: 91 MG/DL (ref 65–99)
HBA1C MFR BLD: 4.8 % (ref 4.8–5.6)
HDLC SERPL-MCNC: 82 MG/DL (ref 40–60)
INTERPRETATION: NORMAL
LDLC SERPL CALC-MCNC: 53 MG/DL (ref 0–100)
Lab: NORMAL
POTASSIUM SERPL-SCNC: 4.3 MMOL/L (ref 3.5–5.2)
PROLACTIN SERPL-MCNC: 10.8 NG/ML (ref 4.8–23.3)
PROT SERPL-MCNC: 7.5 G/DL (ref 6–8.5)
SODIUM SERPL-SCNC: 142 MMOL/L (ref 136–145)
T3FREE SERPL-MCNC: 2.4 PG/ML (ref 2–4.4)
T4 FREE SERPL-MCNC: 1.23 NG/DL (ref 0.93–1.7)
T4 SERPL-MCNC: 6.49 MCG/DL (ref 4.5–11.7)
THYROGLOB AB SERPL-ACNC: 1 IU/ML (ref 0–0.9)
THYROGLOB SERPL-MCNC: 22 NG/ML
TRIGL SERPL-MCNC: 67 MG/DL (ref 0–150)
TSH SERPL DL<=0.005 MIU/L-ACNC: 3.71 MIU/ML (ref 0.27–4.2)
URATE SERPL-MCNC: 2.9 MG/DL (ref 2.4–5.7)
VLDLC SERPL CALC-MCNC: 13.4 MG/DL (ref 5–40)

## 2018-09-11 ENCOUNTER — OFFICE VISIT (OUTPATIENT)
Dept: INTERNAL MEDICINE | Facility: CLINIC | Age: 43
End: 2018-09-11

## 2018-09-11 VITALS
BODY MASS INDEX: 26.33 KG/M2 | TEMPERATURE: 97.9 F | OXYGEN SATURATION: 98 % | WEIGHT: 158 LBS | DIASTOLIC BLOOD PRESSURE: 62 MMHG | HEART RATE: 57 BPM | HEIGHT: 65 IN | SYSTOLIC BLOOD PRESSURE: 104 MMHG | RESPIRATION RATE: 16 BRPM

## 2018-09-11 DIAGNOSIS — F41.1 ANXIETY, GENERALIZED: ICD-10-CM

## 2018-09-11 DIAGNOSIS — E03.9 PRIMARY HYPOTHYROIDISM: ICD-10-CM

## 2018-09-11 PROCEDURE — 99213 OFFICE O/P EST LOW 20 MIN: CPT | Performed by: NURSE PRACTITIONER

## 2018-09-11 RX ORDER — ALPRAZOLAM 0.5 MG/1
0.5 TABLET ORAL 2 TIMES DAILY PRN
Qty: 30 TABLET | Refills: 0 | Status: SHIPPED | OUTPATIENT
Start: 2018-09-11 | End: 2019-02-20 | Stop reason: SDUPTHER

## 2018-09-11 NOTE — PROGRESS NOTES
Chief Complaint   Patient presents with   • Anxiety       Subjective     Elia Payne is a 42 y.o. female being seen for an acute appt for anxiety. She has had increasing anxiety for the past 3 months. Associated insomnia, irritability. She has suffered from this her whole life, and prefers to stay off a daily medication.     She saw Dr. Buckley for hypothyroidism. She quit her tirosint about 6 weeks ago, because of hair falling out, heart palpitations, trouble focusing.       History of Present Illness     Allergies   Allergen Reactions   • Delsym Cgh-Chest Rodo Dm Child [Dextromethorphan-Guaifenesin] Nausea Only   • Synthroid [Levothyroxine Sodium] Other (See Comments)     TACHYCARDIA AND INSOMNIA         Current Outpatient Prescriptions:   •  levonorgestrel (MIRENA) 20 MCG/24HR IUD, by Intrauterine route., Disp: , Rfl:     The following portions of the patient's history were reviewed and updated as appropriate: allergies, current medications, past family history, past medical history, past social history, past surgical history and problem list.    Review of Systems   Constitutional: Negative.  Negative for unexpected weight change (gain 8 pounds off levoxyl).   HENT: Negative.    Eyes: Negative.    Respiratory: Negative.    Cardiovascular: Positive for palpitations. Negative for chest pain and leg swelling.   Gastrointestinal: Negative.    Endocrine: Negative.    Genitourinary: Negative.    Skin: Negative.    Allergic/Immunologic: Negative.    Neurological: Negative.    Hematological: Negative.        Assessment     Physical Exam   Constitutional: She is oriented to person, place, and time. She appears well-developed and well-nourished.   HENT:   Head: Normocephalic.   Neck: Neck supple. No thyromegaly present.   Cardiovascular: Normal rate, regular rhythm and normal heart sounds.    No murmur heard.  Pulmonary/Chest: Effort normal and breath sounds normal. No respiratory distress. She has no wheezes.    Musculoskeletal: She exhibits no edema.   Neurological: She is alert and oriented to person, place, and time. No cranial nerve deficit.   Skin: Skin is warm and dry.   Psychiatric: She has a normal mood and affect. Her behavior is normal. Judgment and thought content normal.   Vitals reviewed.      Teodora Rodrigez was seen today for anxiety.    Diagnoses and all orders for this visit:    Anxiety, generalized  -     ALPRAZolam (XANAX) 0.5 MG tablet; Take 1 tablet by mouth 2 (Two) Times a Day As Needed for Anxiety.    Primary hypothyroidism  -     T4 & TSH ([facility])  -     T3, free        The patient has read and signed the [facility] Controlled Substance Contract.  I will continue to see patient for regular follow up appointments.  They are well controlled on their medication.  RIKKI is updated every 3 months. The patient is aware of the potential for addiction and dependence.

## 2018-09-12 LAB
T3FREE SERPL-MCNC: 3.3 PG/ML (ref 2–4.4)
T4 SERPL-MCNC: 6.39 MCG/DL (ref 4.5–11.7)
TSH SERPL DL<=0.005 MIU/L-ACNC: 6.35 MIU/ML (ref 0.27–4.2)

## 2018-11-29 DIAGNOSIS — E55.9 VITAMIN D DEFICIENCY: ICD-10-CM

## 2018-11-29 DIAGNOSIS — R63.5 ABNORMAL WEIGHT GAIN: ICD-10-CM

## 2018-11-29 DIAGNOSIS — R53.82 CHRONIC FATIGUE: ICD-10-CM

## 2018-11-29 DIAGNOSIS — E03.9 PRIMARY HYPOTHYROIDISM: Primary | ICD-10-CM

## 2018-12-03 ENCOUNTER — RESULTS ENCOUNTER (OUTPATIENT)
Dept: ENDOCRINOLOGY | Age: 43
End: 2018-12-03

## 2018-12-03 DIAGNOSIS — O24.419 GESTATIONAL DIABETES MELLITUS (GDM), ANTEPARTUM, GESTATIONAL DIABETES METHOD OF CONTROL UNSPECIFIED: ICD-10-CM

## 2018-12-03 DIAGNOSIS — R63.5 ABNORMAL WEIGHT GAIN: ICD-10-CM

## 2018-12-03 DIAGNOSIS — R53.82 CHRONIC FATIGUE: ICD-10-CM

## 2018-12-03 DIAGNOSIS — E06.3 HASHIMOTO'S THYROIDITIS: ICD-10-CM

## 2018-12-03 DIAGNOSIS — E03.9 PRIMARY HYPOTHYROIDISM: ICD-10-CM

## 2018-12-13 ENCOUNTER — OFFICE VISIT (OUTPATIENT)
Dept: ENDOCRINOLOGY | Age: 43
End: 2018-12-13

## 2018-12-13 VITALS
WEIGHT: 166 LBS | DIASTOLIC BLOOD PRESSURE: 76 MMHG | HEIGHT: 65 IN | BODY MASS INDEX: 27.66 KG/M2 | SYSTOLIC BLOOD PRESSURE: 110 MMHG

## 2018-12-13 DIAGNOSIS — R53.82 CHRONIC FATIGUE: ICD-10-CM

## 2018-12-13 DIAGNOSIS — E03.9 PRIMARY HYPOTHYROIDISM: Primary | ICD-10-CM

## 2018-12-13 DIAGNOSIS — R63.5 ABNORMAL WEIGHT GAIN: ICD-10-CM

## 2018-12-13 DIAGNOSIS — E06.3 HASHIMOTO'S THYROIDITIS: ICD-10-CM

## 2018-12-13 PROBLEM — O24.419 GESTATIONAL DIABETES MELLITUS (GDM), ANTEPARTUM: Status: RESOLVED | Noted: 2018-06-13 | Resolved: 2018-12-13

## 2018-12-13 PROCEDURE — 99214 OFFICE O/P EST MOD 30 MIN: CPT | Performed by: NURSE PRACTITIONER

## 2018-12-13 NOTE — PROGRESS NOTES
"Maryann Payne is a 43 y.o. female is here today for follow-up.  Chief Complaint   Patient presents with   • Hypothyroidism     No recent labs, pt stopped taking the Synthroid about 4 months ago due to side effects.   • Hashimoto's Thyroiditis     /76   Ht 165.1 cm (65\")   Wt 75.3 kg (166 lb)   BMI 27.62 kg/m²   Current Outpatient Medications on File Prior to Visit   Medication Sig   • ALPRAZolam (XANAX) 0.5 MG tablet Take 1 tablet by mouth 2 (Two) Times a Day As Needed for Anxiety.   • levonorgestrel (MIRENA) 20 MCG/24HR IUD by Intrauterine route.     No current facility-administered medications on file prior to visit.      No family history on file.  Social History     Tobacco Use   • Smoking status: Never Smoker   • Smokeless tobacco: Never Used   Substance Use Topics   • Alcohol use: Yes     Comment: socially   • Drug use: No     Allergies   Allergen Reactions   • Delsym Cgh-Chest Rodo Dm Child [Dextromethorphan-Guaifenesin] Nausea Only   • Synthroid [Levothyroxine Sodium] Other (See Comments)     TACHYCARDIA AND INSOMNIA         History of Present Illness   Encounter Diagnoses   Name Primary?   • Hashimoto's thyroiditis    • Primary hypothyroidism Yes   • Abnormal weight gain    • Chronic fatigue    43-year-old female patient here today for a follow-up visit.  She has not had recent labs done.  She is being seen today for the above mentioned diagnoses.  She states she has gained significant weight however she states she is overeating.  She was placed on Tirosint at her last visit with Dr. Buckley due to intolerance of generic levothyroxine.  She has since quit taking the medication because she states it was causing her more anxiety.  She has significant problems with anxiety and takes Xanax as needed.  She was tearful at today's visit regarding the thoughts of thyroid medication possibly increasing her anxiety.  She states it has been so bad in the past that she is always had to go to " the emergency room.  She has got a 14-year-old child and had gestational diabetes when she was pregnant and she states they had a hard time controlling it.  She is on a Mirena which is approximate 4 years old.  She feels like the hormone is running out because she is starting to spot on occasion.  Her father was diagnosed with type 2 diabetes.  She does not know her mother's history of health problems.  She quit taking the Tirosint approximately 4 months ago which she states helped reduce her palpitations and anxiety      The following portions of the patient's history were reviewed and updated as appropriate: allergies, current medications, past family history, past medical history, past social history, past surgical history and problem list.    Review of Systems   Constitutional: Negative for fatigue.   HENT: Negative for trouble swallowing.    Eyes: Negative for visual disturbance.   Respiratory: Negative for shortness of breath.    Cardiovascular: Negative for leg swelling.   Endocrine: Negative for polyuria.   Skin: Negative for wound.   Neurological: Negative for numbness.       Objective   Physical Exam   Constitutional: She is oriented to person, place, and time. She appears well-developed and well-nourished. No distress.   HENT:   Head: Normocephalic and atraumatic.   Right Ear: External ear normal.   Left Ear: External ear normal.   Nose: Nose normal.   Mouth/Throat: Oropharynx is clear and moist. No oropharyngeal exudate.   Eyes: Conjunctivae and EOM are normal. Pupils are equal, round, and reactive to light. Right eye exhibits no discharge. Left eye exhibits no discharge. No scleral icterus.   Neck: Normal range of motion. Neck supple. No JVD present. No tracheal deviation present. No thyromegaly present.   Cardiovascular: Normal rate, regular rhythm, normal heart sounds and intact distal pulses. Exam reveals no gallop and no friction rub.   No murmur heard.  Pulmonary/Chest: Effort normal and breath  sounds normal. No stridor. No respiratory distress. She has no wheezes. She has no rales. She exhibits no tenderness.   Abdominal: Soft. Bowel sounds are normal. She exhibits no distension and no mass. There is no tenderness. There is no rebound and no guarding. No hernia.   Musculoskeletal: Normal range of motion. She exhibits no edema, tenderness or deformity.   Lymphadenopathy:     She has no cervical adenopathy.   Neurological: She is alert and oriented to person, place, and time. She has normal reflexes. She displays normal reflexes. No cranial nerve deficit or sensory deficit. She exhibits normal muscle tone. Coordination normal.   Skin: Skin is warm and dry. No rash noted. She is not diaphoretic. No erythema. No pallor.   Psychiatric: She has a normal mood and affect. Her behavior is normal. Judgment and thought content normal.   Nursing note and vitals reviewed.    Results for orders placed or performed in visit on 09/11/18   T4 & TSH ([facility])   Result Value Ref Range    TSH 6.350 (H) 0.270 - 4.200 mIU/mL    T4, Total 6.39 4.50 - 11.70 mcg/dL   T3, free   Result Value Ref Range    T3, Free 3.3 2.0 - 4.4 pg/mL         Assessment/Plan   Problems Addressed this Visit        Endocrine    Hashimoto's thyroiditis    Relevant Orders    Comprehensive Metabolic Panel    C-Peptide    Hemoglobin A1c    Comprehensive Thyroglobulin    Lipid Panel    T3, Free    T4, Free    Thyroid Antibodies    Thyroid Panel With TSH    Vitamin D 25 Hydroxy    FSH & LH    Thyroid Peroxidase Antibody    TestT+TestF+SHBG    US Thyroid    Primary hypothyroidism - Primary    Relevant Orders    Comprehensive Metabolic Panel    C-Peptide    Hemoglobin A1c    Comprehensive Thyroglobulin    Lipid Panel    T3, Free    T4, Free    Thyroid Antibodies    Thyroid Panel With TSH    Vitamin D 25 Hydroxy    FSH & LH    Thyroid Peroxidase Antibody    TestT+TestF+SHBG    US Thyroid       Other    Abnormal weight gain    Relevant Orders    Comprehensive  Metabolic Panel    C-Peptide    Hemoglobin A1c    Comprehensive Thyroglobulin    Lipid Panel    T3, Free    T4, Free    Thyroid Antibodies    Thyroid Panel With TSH    Vitamin D 25 Hydroxy    FSH & LH    Thyroid Peroxidase Antibody    TestT+TestF+SHBG    US Thyroid    Chronic fatigue    Relevant Orders    Comprehensive Metabolic Panel    C-Peptide    Hemoglobin A1c    Comprehensive Thyroglobulin    Lipid Panel    T3, Free    T4, Free    Thyroid Antibodies    Thyroid Panel With TSH    Vitamin D 25 Hydroxy    FSH & LH    Thyroid Peroxidase Antibody    TestT+TestF+SHBG    US Thyroid        In summary, patient was seen and examined.  She will have extensive labs done at today's visit will be notified of the results along with any further recommendations.  Her previous labs were reviewed.  She is on hormone replacement therapy in the form of a Mirena.  She is currently not on any thyroid medication.  She takes Xanax as needed for anxiety.  She is anxious at today's visit and almost tearful.  She is requesting a follow-up with me in 6 months with labs.  No medications were added or changed at today's visit

## 2018-12-15 LAB
25(OH)D3+25(OH)D2 SERPL-MCNC: 32.1 NG/ML (ref 30–100)
ALBUMIN SERPL-MCNC: 4.4 G/DL (ref 3.5–5.2)
ALBUMIN/GLOB SERPL: 1.7 G/DL
ALP SERPL-CCNC: 92 U/L (ref 39–117)
ALT SERPL-CCNC: 22 U/L (ref 1–33)
AST SERPL-CCNC: 19 U/L (ref 1–32)
BILIRUB SERPL-MCNC: 0.3 MG/DL (ref 0.1–1.2)
BUN SERPL-MCNC: 13 MG/DL (ref 6–20)
BUN/CREAT SERPL: 18.1 (ref 7–25)
C PEPTIDE SERPL-MCNC: 1.7 NG/ML (ref 1.1–4.4)
CALCIUM SERPL-MCNC: 9.2 MG/DL (ref 8.6–10.5)
CHLORIDE SERPL-SCNC: 101 MMOL/L (ref 98–107)
CHOLEST SERPL-MCNC: 163 MG/DL (ref 0–200)
CO2 SERPL-SCNC: 27.6 MMOL/L (ref 22–29)
CREAT SERPL-MCNC: 0.72 MG/DL (ref 0.57–1)
FSH SERPL-ACNC: 16.8 MIU/ML
FT4I SERPL CALC-MCNC: 1.5 (ref 1.2–4.9)
GLOBULIN SER CALC-MCNC: 2.6 GM/DL
GLUCOSE SERPL-MCNC: 79 MG/DL (ref 65–99)
HBA1C MFR BLD: 4.73 % (ref 4.8–5.6)
HDLC SERPL-MCNC: 78 MG/DL (ref 40–60)
INTERPRETATION: NORMAL
LDLC SERPL CALC-MCNC: 75 MG/DL (ref 0–100)
LH SERPL-ACNC: 16.4 MIU/ML
Lab: NORMAL
Lab: NORMAL
POTASSIUM SERPL-SCNC: 4.3 MMOL/L (ref 3.5–5.2)
PROT SERPL-MCNC: 7 G/DL (ref 6–8.5)
SHBG SERPL-SCNC: 45.4 NMOL/L (ref 24.6–122)
SODIUM SERPL-SCNC: 139 MMOL/L (ref 136–145)
T3FREE SERPL-MCNC: 3 PG/ML (ref 2–4.4)
T3RU NFR SERPL: 25 % (ref 24–39)
T4 FREE SERPL-MCNC: 1.05 NG/DL (ref 0.93–1.7)
T4 SERPL-MCNC: 6 UG/DL (ref 4.5–12)
TESTOST FREE SERPL-MCNC: 2.2 PG/ML (ref 0–4.2)
TESTOST SERPL-MCNC: 16 NG/DL (ref 8–48)
THYROGLOB AB SERPL-ACNC: <1 IU/ML
THYROGLOB AB SERPL-ACNC: <1 IU/ML (ref 0–0.9)
THYROGLOB SERPL-MCNC: 13.5 NG/ML
THYROGLOB SERPL-MCNC: NORMAL NG/ML
THYROPEROXIDASE AB SERPL-ACNC: 9 IU/ML (ref 0–34)
TRIGL SERPL-MCNC: 50 MG/DL (ref 0–150)
TSH SERPL DL<=0.005 MIU/L-ACNC: 4.79 UIU/ML (ref 0.45–4.5)
VLDLC SERPL CALC-MCNC: 10 MG/DL (ref 5–40)

## 2018-12-29 ENCOUNTER — RESULTS ENCOUNTER (OUTPATIENT)
Dept: ENDOCRINOLOGY | Age: 43
End: 2018-12-29

## 2018-12-29 DIAGNOSIS — R53.82 CHRONIC FATIGUE: ICD-10-CM

## 2018-12-29 DIAGNOSIS — E03.9 PRIMARY HYPOTHYROIDISM: ICD-10-CM

## 2018-12-29 DIAGNOSIS — E55.9 VITAMIN D DEFICIENCY: ICD-10-CM

## 2018-12-29 DIAGNOSIS — R63.5 ABNORMAL WEIGHT GAIN: ICD-10-CM

## 2019-02-20 ENCOUNTER — OFFICE VISIT (OUTPATIENT)
Dept: INTERNAL MEDICINE | Facility: CLINIC | Age: 44
End: 2019-02-20

## 2019-02-20 VITALS
SYSTOLIC BLOOD PRESSURE: 118 MMHG | HEIGHT: 65 IN | RESPIRATION RATE: 16 BRPM | WEIGHT: 161 LBS | DIASTOLIC BLOOD PRESSURE: 90 MMHG | HEART RATE: 89 BPM | TEMPERATURE: 98.5 F | BODY MASS INDEX: 26.82 KG/M2 | OXYGEN SATURATION: 98 %

## 2019-02-20 DIAGNOSIS — F41.1 GAD (GENERALIZED ANXIETY DISORDER): Primary | ICD-10-CM

## 2019-02-20 PROCEDURE — 99213 OFFICE O/P EST LOW 20 MIN: CPT | Performed by: NURSE PRACTITIONER

## 2019-02-20 RX ORDER — ALPRAZOLAM 0.5 MG/1
0.5 TABLET ORAL 2 TIMES DAILY PRN
Qty: 30 TABLET | Refills: 0
Start: 2019-02-20 | End: 2019-08-06 | Stop reason: SDUPTHER

## 2019-02-20 RX ORDER — ESCITALOPRAM OXALATE 10 MG/1
10 TABLET ORAL DAILY
Qty: 30 TABLET | Refills: 1 | Status: SHIPPED | OUTPATIENT
Start: 2019-02-20 | End: 2019-03-20 | Stop reason: SDUPTHER

## 2019-02-20 NOTE — PROGRESS NOTES
Chief Complaint   Patient presents with   • Panic Attack     daily panic attacks; scared of everything; limits activities   • Fussy       Subjective     Elia Payne is a 43 y.o. female being seen for a follow up appointment today regarding XOCHILT. She had had anxiety attacks every day. Her medication has increased over the past few months. She is falling asleep okay. Symptoms triggered by driving, social situations.      History of Present Illness     Allergies   Allergen Reactions   • Delsym Cgh-Chest Rodo Dm Child [Dextromethorphan-Guaifenesin] Nausea Only   • Synthroid [Levothyroxine Sodium] Other (See Comments)     TACHYCARDIA AND INSOMNIA         Current Outpatient Medications:   •  ALPRAZolam (XANAX) 0.5 MG tablet, Take 1 tablet by mouth 2 (Two) Times a Day As Needed for Anxiety., Disp: 30 tablet, Rfl: 0  •  levonorgestrel (MIRENA) 20 MCG/24HR IUD, by Intrauterine route., Disp: , Rfl:     The following portions of the patient's history were reviewed and updated as appropriate: allergies, current medications, past family history, past medical history, past social history, past surgical history and problem list.    Review of Systems   Constitutional: Negative.    HENT: Negative.    Eyes: Negative.    Respiratory: Negative.    Cardiovascular: Negative.    Gastrointestinal: Negative.    Endocrine: Negative.    Genitourinary: Negative.    Musculoskeletal: Negative.    Skin: Negative.    Allergic/Immunologic: Negative.    Neurological: Negative.    Hematological: Negative.    Psychiatric/Behavioral: Positive for decreased concentration and dysphoric mood. The patient is nervous/anxious.        Assessment     Physical Exam   Constitutional: She appears well-developed and well-nourished. No distress.   Cardiovascular: Normal rate, regular rhythm and normal heart sounds.   No murmur heard.  Pulmonary/Chest: Effort normal and breath sounds normal. No stridor. No respiratory distress.   Skin: Skin is warm and dry.    Psychiatric: She has a normal mood and affect. Her speech is normal and behavior is normal. Cognition and memory are normal.   Tearful   Vitals reviewed.      Plan      Diagnosis Plan   1. XOCHILT (generalized anxiety disorder)  escitalopram (LEXAPRO) 10 MG tablet    Ambulatory Referral to Psychology    ALPRAZolam (XANAX) 0.5 MG tablet   2. Anxiety, generalized       The patient has read and signed the [facility] Controlled Substance Contract.  I will continue to see patient for regular follow up appointments.  They are well controlled on their medication.  RIKKI is updated every 3 months. The patient is aware of the potential for addiction and dependence.    Follow up in 4 weeks

## 2019-03-20 ENCOUNTER — OFFICE VISIT (OUTPATIENT)
Dept: INTERNAL MEDICINE | Facility: CLINIC | Age: 44
End: 2019-03-20

## 2019-03-20 VITALS
WEIGHT: 160 LBS | HEIGHT: 65 IN | SYSTOLIC BLOOD PRESSURE: 110 MMHG | HEART RATE: 63 BPM | TEMPERATURE: 98.8 F | OXYGEN SATURATION: 98 % | BODY MASS INDEX: 26.66 KG/M2 | RESPIRATION RATE: 16 BRPM | DIASTOLIC BLOOD PRESSURE: 70 MMHG

## 2019-03-20 DIAGNOSIS — F41.1 GAD (GENERALIZED ANXIETY DISORDER): Primary | ICD-10-CM

## 2019-03-20 PROCEDURE — 99213 OFFICE O/P EST LOW 20 MIN: CPT | Performed by: NURSE PRACTITIONER

## 2019-03-20 RX ORDER — ESCITALOPRAM OXALATE 20 MG/1
20 TABLET ORAL DAILY
Qty: 90 TABLET | Refills: 3 | Status: SHIPPED | OUTPATIENT
Start: 2019-03-20 | End: 2020-03-20

## 2019-03-20 NOTE — PROGRESS NOTES
Chief Complaint   Patient presents with   • Follow-up   • Anxiety       Subjective     Elia Payne is a 43 y.o. female being seen for a follow up appointment today regarding Anxiety. She was started on Lexapro 10 mg daily. She was also seen by Duran Rain, and she is working with him on that. She feels like symptoms are 60% better. She denies any side effects of medications. She has only had 3 panic attacks since last visit, triggered by driving far distances. She had only 1 xaanax in the past week.        History of Present Illness     Allergies   Allergen Reactions   • Delsym Cgh-Chest Rodo Dm Child [Dextromethorphan-Guaifenesin] Nausea Only   • Synthroid [Levothyroxine Sodium] Other (See Comments)     TACHYCARDIA AND INSOMNIA   • Wellbutrin [Bupropion] Mental Status Change         Current Outpatient Medications:   •  ALPRAZolam (XANAX) 0.5 MG tablet, Take 1 tablet by mouth 2 (Two) Times a Day As Needed for Anxiety., Disp: 30 tablet, Rfl: 0  •  escitalopram (LEXAPRO) 10 MG tablet, Take 1 tablet by mouth Daily., Disp: 30 tablet, Rfl: 1  •  levonorgestrel (MIRENA) 20 MCG/24HR IUD, by Intrauterine route., Disp: , Rfl:     The following portions of the patient's history were reviewed and updated as appropriate: allergies, current medications, past family history, past medical history, past social history, past surgical history and problem list.    Review of Systems   Constitutional: Negative.    HENT: Negative.    Eyes: Negative.    Respiratory: Negative.    Cardiovascular: Negative.  Negative for chest pain, palpitations and leg swelling.   Gastrointestinal: Negative.    Endocrine: Negative.    Genitourinary: Negative.    Musculoskeletal: Negative.    Allergic/Immunologic: Negative.    Neurological: Negative.    Hematological: Negative.    Psychiatric/Behavioral: Positive for decreased concentration.       Assessment     Physical Exam   Constitutional: She is oriented to person, place, and time. She appears  well-developed and well-nourished. No distress.   HENT:   Head: Normocephalic.   Neck: Neck supple. No thyromegaly present.   Cardiovascular: Normal rate, regular rhythm and normal heart sounds.   No murmur heard.  Pulmonary/Chest: Effort normal and breath sounds normal. No respiratory distress.   Musculoskeletal: She exhibits no edema.   Neurological: She is alert and oriented to person, place, and time.   Skin: Skin is warm and dry.   Psychiatric: She has a normal mood and affect. Her behavior is normal. Thought content normal.   Vitals reviewed.      Plan     Her fasting labs were reviewed with the patient from last week.     Elia was seen today for follow-up and anxiety.    Diagnoses and all orders for this visit:    XOCHILT (generalized anxiety disorder)  -     escitalopram (LEXAPRO) 20 MG tablet; Take 1 tablet by mouth Daily.      She will get ADD testing thru psychology.    Follow up after testing complete.

## 2019-04-11 ENCOUNTER — OFFICE VISIT (OUTPATIENT)
Dept: INTERNAL MEDICINE | Facility: CLINIC | Age: 44
End: 2019-04-11

## 2019-04-11 VITALS
HEART RATE: 67 BPM | RESPIRATION RATE: 16 BRPM | TEMPERATURE: 98.6 F | SYSTOLIC BLOOD PRESSURE: 114 MMHG | DIASTOLIC BLOOD PRESSURE: 66 MMHG | BODY MASS INDEX: 28.16 KG/M2 | HEIGHT: 65 IN | WEIGHT: 169 LBS | OXYGEN SATURATION: 98 %

## 2019-04-11 DIAGNOSIS — F90.0 ATTENTION DEFICIT HYPERACTIVITY DISORDER (ADHD), PREDOMINANTLY INATTENTIVE TYPE: Primary | ICD-10-CM

## 2019-04-11 PROCEDURE — 99213 OFFICE O/P EST LOW 20 MIN: CPT | Performed by: NURSE PRACTITIONER

## 2019-04-11 RX ORDER — DEXTROAMPHETAMINE SACCHARATE, AMPHETAMINE ASPARTATE MONOHYDRATE, DEXTROAMPHETAMINE SULFATE AND AMPHETAMINE SULFATE 5; 5; 5; 5 MG/1; MG/1; MG/1; MG/1
20 CAPSULE, EXTENDED RELEASE ORAL EVERY MORNING
Qty: 30 CAPSULE | Refills: 0
Start: 2019-04-11 | End: 2019-04-11 | Stop reason: SDUPTHER

## 2019-04-11 NOTE — PROGRESS NOTES
Chief Complaint   Patient presents with   • Follow-up   • ADD       Subjective     Elia Payne is a 43 y.o. female being seen for a follow up appointment today regarding ADHD. She was diagnosed with ADHD per Duran Rain, see scanned record.  She has trouble with excess talking, blurting out answers, mentally focusing on tasks (see scanned questionnaire). She denies mood swings. She has had 2 panic attacks since last visit, related to driving long distances.       History of Present Illness     Allergies   Allergen Reactions   • Delsym Cgh-Chest Rodo Dm Child [Dextromethorphan-Guaifenesin] Nausea Only   • Synthroid [Levothyroxine Sodium] Other (See Comments)     TACHYCARDIA AND INSOMNIA   • Wellbutrin [Bupropion] Mental Status Change         Current Outpatient Medications:   •  ALPRAZolam (XANAX) 0.5 MG tablet, Take 1 tablet by mouth 2 (Two) Times a Day As Needed for Anxiety., Disp: 30 tablet, Rfl: 0  •  escitalopram (LEXAPRO) 20 MG tablet, Take 1 tablet by mouth Daily., Disp: 90 tablet, Rfl: 3  •  levonorgestrel (MIRENA) 20 MCG/24HR IUD, by Intrauterine route., Disp: , Rfl:     The following portions of the patient's history were reviewed and updated as appropriate: allergies, current medications, past family history, past medical history, past social history, past surgical history and problem list.    Review of Systems   Constitutional: Negative.    HENT: Negative.    Eyes: Negative.    Respiratory: Negative.    Cardiovascular: Negative.    Gastrointestinal: Negative.    Endocrine: Negative.    Genitourinary: Negative.    Musculoskeletal: Negative.    Skin: Negative.    Allergic/Immunologic: Negative.    Neurological: Negative.    Hematological: Negative.    Psychiatric/Behavioral: The patient is nervous/anxious.        Assessment     Physical Exam   Constitutional: She is oriented to person, place, and time. She appears well-developed and well-nourished.   Cardiovascular: Normal rate, regular rhythm and  normal heart sounds.   No murmur heard.  Pulmonary/Chest: Effort normal and breath sounds normal. No stridor. No respiratory distress.   Neurological: She is alert and oriented to person, place, and time.   Skin: Skin is warm and dry.   Psychiatric: She has a normal mood and affect. Her behavior is normal. Thought content normal. Her mood appears not anxious. Her speech is rapid and/or pressured. She is not agitated, not aggressive and not hyperactive. Cognition and memory are normal. She expresses impulsivity. She is inattentive.   Vitals reviewed.      Plan     Her fasting labs were reviewed with the patient from last week.     Elia was seen today for follow-up and add.    Diagnoses and all orders for this visit:    Attention deficit hyperactivity disorder (ADHD), predominantly inattentive type  -     amphetamine-dextroamphetamine XR (ADDERALL XR) 20 MG 24 hr capsule; Take 1 capsule by mouth Every Morning    The patient has read and signed the [facility] Controlled Substance Contract.  I will continue to see patient for regular follow up appointments.  They are well controlled on their medication.  RIKKI is updated every 3 months. The patient is aware of the potential for addiction and dependence.    Discussed stimulants and risk of palpitations.    Follow up as needed

## 2019-04-12 RX ORDER — DEXTROAMPHETAMINE SACCHARATE, AMPHETAMINE ASPARTATE MONOHYDRATE, DEXTROAMPHETAMINE SULFATE AND AMPHETAMINE SULFATE 5; 5; 5; 5 MG/1; MG/1; MG/1; MG/1
20 CAPSULE, EXTENDED RELEASE ORAL EVERY MORNING
Qty: 30 CAPSULE | Refills: 0 | Status: SHIPPED | OUTPATIENT
Start: 2019-04-12 | End: 2019-04-18 | Stop reason: DRUGHIGH

## 2019-04-18 DIAGNOSIS — F90.0 ATTENTION DEFICIT HYPERACTIVITY DISORDER (ADHD), PREDOMINANTLY INATTENTIVE TYPE: Primary | ICD-10-CM

## 2019-04-18 NOTE — TELEPHONE ENCOUNTER
----- Message from TED Thompson sent at 2019 12:41 PM EDT -----  Regarding: RE: Adderall   Reduce Adderall XR to 10 mg once daily due to insomnia. Please set up to for Dr. Gomez to sign.     Adderall XR 10mg  Si po daily  Disp #30, no refills    ----- Message -----  From: Sarina Chris MA  Sent: 2019  11:54 AM  To: TED Thompson  Subject: Adderall                                         Duran Rain called from [facility] and informed me that Elia thought she was focusing much better on the Adderall but she is feeling very jittery and her sleep is reduced to 3-4 hours a night. Her appetite is normal but she was wondering if you wanted to lower her dose or change medications.      Duran's number   [phone]

## 2019-04-19 RX ORDER — DEXTROAMPHETAMINE SACCHARATE, AMPHETAMINE ASPARTATE MONOHYDRATE, DEXTROAMPHETAMINE SULFATE AND AMPHETAMINE SULFATE 2.5; 2.5; 2.5; 2.5 MG/1; MG/1; MG/1; MG/1
10 CAPSULE, EXTENDED RELEASE ORAL EVERY MORNING
Qty: 30 CAPSULE | Refills: 0 | Status: SHIPPED | OUTPATIENT
Start: 2019-04-19 | End: 2019-05-15 | Stop reason: SDUPTHER

## 2019-05-15 ENCOUNTER — OFFICE VISIT (OUTPATIENT)
Dept: INTERNAL MEDICINE | Facility: CLINIC | Age: 44
End: 2019-05-15

## 2019-05-15 VITALS
HEART RATE: 72 BPM | HEIGHT: 65 IN | RESPIRATION RATE: 16 BRPM | SYSTOLIC BLOOD PRESSURE: 102 MMHG | DIASTOLIC BLOOD PRESSURE: 78 MMHG | BODY MASS INDEX: 27.82 KG/M2 | OXYGEN SATURATION: 92 % | WEIGHT: 167 LBS | TEMPERATURE: 98.1 F

## 2019-05-15 DIAGNOSIS — F90.0 ATTENTION DEFICIT HYPERACTIVITY DISORDER (ADHD), PREDOMINANTLY INATTENTIVE TYPE: Primary | ICD-10-CM

## 2019-05-15 PROCEDURE — 99212 OFFICE O/P EST SF 10 MIN: CPT | Performed by: NURSE PRACTITIONER

## 2019-05-15 RX ORDER — DEXTROAMPHETAMINE SACCHARATE, AMPHETAMINE ASPARTATE MONOHYDRATE, DEXTROAMPHETAMINE SULFATE AND AMPHETAMINE SULFATE 2.5; 2.5; 2.5; 2.5 MG/1; MG/1; MG/1; MG/1
10 CAPSULE, EXTENDED RELEASE ORAL EVERY MORNING
Qty: 30 CAPSULE | Refills: 0
Start: 2019-05-15 | End: 2019-05-24 | Stop reason: SDUPTHER

## 2019-05-15 NOTE — PROGRESS NOTES
Chief Complaint   Patient presents with   • Follow-up   • Anxiety   • ADD       Subjective     Elia Payne is a 43 y.o. female being seen for a follow up appointment today regarding ADHD. She was in the office 1 month ago after been diagnosed with ADD thru Duran Rain.  She was started on Adderall XR 20 mg daily for ADD, but it was causing some jitteriness. We reduced it to Adderall XR 10 mg dose. She is tolerating this well. She is able to focus, she is sleeping well, no panic attacks.       History of Present Illness     Allergies   Allergen Reactions   • Delsym Cgh-Chest Rodo Dm Child [Dextromethorphan-Guaifenesin] Nausea Only   • Synthroid [Levothyroxine Sodium] Other (See Comments)     TACHYCARDIA AND INSOMNIA   • Wellbutrin [Bupropion] Mental Status Change         Current Outpatient Medications:   •  ALPRAZolam (XANAX) 0.5 MG tablet, Take 1 tablet by mouth 2 (Two) Times a Day As Needed for Anxiety., Disp: 30 tablet, Rfl: 0  •  amphetamine-dextroamphetamine XR (ADDERALL XR) 10 MG 24 hr capsule, Take 1 capsule by mouth Every Morning, Disp: 30 capsule, Rfl: 0  •  escitalopram (LEXAPRO) 20 MG tablet, Take 1 tablet by mouth Daily., Disp: 90 tablet, Rfl: 3  •  levonorgestrel (MIRENA) 20 MCG/24HR IUD, by Intrauterine route., Disp: , Rfl:     The following portions of the patient's history were reviewed and updated as appropriate: allergies, current medications, past family history, past medical history, past social history, past surgical history and problem list.    Review of Systems   Constitutional: Negative.    HENT: Negative.    Eyes: Negative.    Cardiovascular: Negative.  Negative for chest pain and leg swelling.   Gastrointestinal: Negative.    Endocrine: Negative.    Genitourinary: Negative.    Musculoskeletal: Negative.    Allergic/Immunologic: Negative.    Neurological: Negative.    Hematological: Negative.    Psychiatric/Behavioral: Negative for hallucinations, self-injury and sleep disturbance.  The patient is nervous/anxious. The patient is not hyperactive.        Assessment     Physical Exam   Constitutional: She is oriented to person, place, and time. She appears well-developed and well-nourished.   Neck: Neck supple.   Cardiovascular: Normal rate, regular rhythm and normal heart sounds.   No murmur heard.  Pulmonary/Chest: Effort normal and breath sounds normal. No stridor. No respiratory distress.   Musculoskeletal: She exhibits no edema.   Neurological: She is alert and oriented to person, place, and time.   Skin: Skin is warm and dry.   Psychiatric: She has a normal mood and affect. Her behavior is normal. Judgment and thought content normal.   Vitals reviewed.      Teodora Rodrigez was seen today for follow-up, anxiety and add.    Diagnoses and all orders for this visit:    Attention deficit hyperactivity disorder (ADHD), predominantly inattentive type  -     amphetamine-dextroamphetamine XR (ADDERALL XR) 10 MG 24 hr capsule; Take 1 capsule by mouth Every Morning        The patient has read and signed the [facility] Controlled Substance Contract.  I will continue to see patient for regular follow up appointments.  They are well controlled on their medication.  RIKKI is updated every 3 months. The patient is aware of the potential for addiction and dependence.    Follow up in 3 months

## 2019-05-20 ENCOUNTER — TELEPHONE (OUTPATIENT)
Dept: ENDOCRINOLOGY | Age: 44
End: 2019-05-20

## 2019-05-20 NOTE — TELEPHONE ENCOUNTER
I spoke with pt regarding her 0613/19 appt with arsalan. She stated that she would have to look at schedule and call back.,

## 2019-05-24 DIAGNOSIS — F90.0 ATTENTION DEFICIT HYPERACTIVITY DISORDER (ADHD), PREDOMINANTLY INATTENTIVE TYPE: ICD-10-CM

## 2019-05-24 RX ORDER — DEXTROAMPHETAMINE SACCHARATE, AMPHETAMINE ASPARTATE MONOHYDRATE, DEXTROAMPHETAMINE SULFATE AND AMPHETAMINE SULFATE 2.5; 2.5; 2.5; 2.5 MG/1; MG/1; MG/1; MG/1
10 CAPSULE, EXTENDED RELEASE ORAL EVERY MORNING
Qty: 30 CAPSULE | Refills: 0 | Status: SHIPPED | OUTPATIENT
Start: 2019-05-24 | End: 2019-07-08 | Stop reason: SDUPTHER

## 2019-05-28 ENCOUNTER — TELEPHONE (OUTPATIENT)
Dept: INTERNAL MEDICINE | Facility: CLINIC | Age: 44
End: 2019-05-28

## 2019-05-28 NOTE — TELEPHONE ENCOUNTER
SENT TO PHARMACY ON  5/24/19    ----- Message from TED Thompson sent at 5/24/2019  9:27 AM EDT -----  Okay for 1 refill  ----- Message -----  From: Nicol Caldwell MA  Sent: 5/24/2019   9:13 AM  To: TED Thompson    PT  CALLED NEEDS REFILL ON ADERALL  10MG  LOV   5/15/19  KAREN, NEEDS RIKKI    [phone]

## 2019-07-08 ENCOUNTER — TELEPHONE (OUTPATIENT)
Dept: INTERNAL MEDICINE | Facility: CLINIC | Age: 44
End: 2019-07-08

## 2019-07-08 DIAGNOSIS — F90.0 ATTENTION DEFICIT HYPERACTIVITY DISORDER (ADHD), PREDOMINANTLY INATTENTIVE TYPE: ICD-10-CM

## 2019-07-08 RX ORDER — DEXTROAMPHETAMINE SACCHARATE, AMPHETAMINE ASPARTATE MONOHYDRATE, DEXTROAMPHETAMINE SULFATE AND AMPHETAMINE SULFATE 2.5; 2.5; 2.5; 2.5 MG/1; MG/1; MG/1; MG/1
10 CAPSULE, EXTENDED RELEASE ORAL EVERY MORNING
Qty: 30 CAPSULE | Refills: 0 | Status: SHIPPED | OUTPATIENT
Start: 2019-07-08 | End: 2019-08-06

## 2019-07-08 NOTE — TELEPHONE ENCOUNTER
PT  AWARE  SENT TO PHARMACY        ----- Message from TED Thompson sent at 7/8/2019  9:46 AM EDT -----   Okay for 1 refill  ----- Message -----  From: Nicol Caldwell MA  Sent: 7/8/2019   9:18 AM  To: TED Thompson    PT CALLED WANTS TO GET REFILL ON   ADDERALL 10MG  LOV   5/15/19  [facility]   [phone]

## 2019-08-06 ENCOUNTER — OFFICE VISIT (OUTPATIENT)
Dept: INTERNAL MEDICINE | Facility: CLINIC | Age: 44
End: 2019-08-06

## 2019-08-06 VITALS
WEIGHT: 175 LBS | DIASTOLIC BLOOD PRESSURE: 72 MMHG | HEIGHT: 65 IN | SYSTOLIC BLOOD PRESSURE: 120 MMHG | TEMPERATURE: 98.7 F | BODY MASS INDEX: 29.16 KG/M2 | RESPIRATION RATE: 16 BRPM | OXYGEN SATURATION: 99 % | HEART RATE: 70 BPM

## 2019-08-06 DIAGNOSIS — F90.0 ATTENTION DEFICIT HYPERACTIVITY DISORDER (ADHD), PREDOMINANTLY INATTENTIVE TYPE: Primary | ICD-10-CM

## 2019-08-06 DIAGNOSIS — F90.0 ATTENTION DEFICIT HYPERACTIVITY DISORDER (ADHD), PREDOMINANTLY INATTENTIVE TYPE: ICD-10-CM

## 2019-08-06 DIAGNOSIS — F41.1 GAD (GENERALIZED ANXIETY DISORDER): ICD-10-CM

## 2019-08-06 PROCEDURE — 99213 OFFICE O/P EST LOW 20 MIN: CPT | Performed by: NURSE PRACTITIONER

## 2019-08-06 RX ORDER — ALPRAZOLAM 0.5 MG/1
0.5 TABLET ORAL 2 TIMES DAILY PRN
Qty: 30 TABLET | Refills: 0 | Status: SHIPPED | OUTPATIENT
Start: 2019-08-06 | End: 2022-01-20

## 2019-08-06 RX ORDER — DEXTROAMPHETAMINE SACCHARATE, AMPHETAMINE ASPARTATE MONOHYDRATE, DEXTROAMPHETAMINE SULFATE AND AMPHETAMINE SULFATE 5; 5; 5; 5 MG/1; MG/1; MG/1; MG/1
20 CAPSULE, EXTENDED RELEASE ORAL EVERY MORNING
Qty: 30 CAPSULE | Refills: 0 | Status: SHIPPED | OUTPATIENT
Start: 2019-08-06 | End: 2019-08-13

## 2019-08-06 RX ORDER — DEXTROAMPHETAMINE SACCHARATE, AMPHETAMINE ASPARTATE MONOHYDRATE, DEXTROAMPHETAMINE SULFATE AND AMPHETAMINE SULFATE 5; 5; 5; 5 MG/1; MG/1; MG/1; MG/1
20 CAPSULE, EXTENDED RELEASE ORAL EVERY MORNING
Start: 2019-08-06 | End: 2019-08-06 | Stop reason: SDUPTHER

## 2019-08-06 NOTE — PROGRESS NOTES
"Chief Complaint   Patient presents with   • Follow-up   • ADD       Subjective     Elia Payne is a 43 y.o. female being seen for a follow up appointment today regarding  ADD and anxiety. She just recently got back from the beach, and is \"feeling great.\" . She denies any anxiety. She does not have panic attacks. She is working full time. She is focusing well at work. She is seeing the counselor once weekly. She has not filled xanax since February.       History of Present Illness     Allergies   Allergen Reactions   • Delsym Cgh-Chest Rodo Dm Child [Dextromethorphan-Guaifenesin] Nausea Only   • Synthroid [Levothyroxine Sodium] Other (See Comments)     TACHYCARDIA AND INSOMNIA   • Wellbutrin [Bupropion] Mental Status Change         Current Outpatient Medications:   •  ALPRAZolam (XANAX) 0.5 MG tablet, Take 1 tablet by mouth 2 (Two) Times a Day As Needed for Anxiety., Disp: 30 tablet, Rfl: 0  •  amphetamine-dextroamphetamine XR (ADDERALL XR) 10 MG 24 hr capsule, Take 1 capsule by mouth Every Morning, Disp: 30 capsule, Rfl: 0  •  escitalopram (LEXAPRO) 20 MG tablet, Take 1 tablet by mouth Daily., Disp: 90 tablet, Rfl: 3  •  levonorgestrel (MIRENA) 20 MCG/24HR IUD, by Intrauterine route., Disp: , Rfl:     The following portions of the patient's history were reviewed and updated as appropriate: allergies, current medications, past family history, past medical history, past social history, past surgical history and problem list.    Review of Systems   Constitutional: Negative.    HENT: Negative.    Eyes: Negative.    Respiratory: Negative.    Cardiovascular: Negative.    Gastrointestinal: Negative.    Endocrine: Negative.    Genitourinary: Negative.    Musculoskeletal: Negative.    Allergic/Immunologic: Negative.    Neurological: Negative.    Psychiatric/Behavioral: The patient is nervous/anxious.        Assessment     Physical Exam   Constitutional: She is oriented to person, place, and time. She appears " well-developed and well-nourished. No distress.   Neck: Neck supple.   Cardiovascular: Normal rate, regular rhythm and normal heart sounds.   No murmur heard.  Pulmonary/Chest: Effort normal and breath sounds normal. No stridor. No respiratory distress.   Neurological: She is alert and oriented to person, place, and time.   Skin: Skin is warm and dry.   Psychiatric: She has a normal mood and affect. Her behavior is normal.   Vitals reviewed.      Plan     Her fasting labs were reviewed with the patient from last week.     Elia was seen today for follow-up and add.    Diagnoses and all orders for this visit:    Attention deficit hyperactivity disorder (ADHD), predominantly inattentive type  -     amphetamine-dextroamphetamine XR (ADDERALL XR) 20 MG 24 hr capsule; Take 1 capsule by mouth Every Morning    XOCHILT (generalized anxiety disorder)  -     ALPRAZolam (XANAX) 0.5 MG tablet; Take 1 tablet by mouth 2 (Two) Times a Day As Needed for Anxiety.    Continue Lexapro 20 mg daily    The patient has read and signed the [facility] Controlled Substance Contract.  I will continue to see patient for regular follow up appointments.  They are well controlled on their medication.  RIKKI is updated every 3 months. The patient is aware of the potential for addiction and dependence.

## 2019-08-13 ENCOUNTER — OFFICE VISIT (OUTPATIENT)
Dept: INTERNAL MEDICINE | Facility: CLINIC | Age: 44
End: 2019-08-13

## 2019-08-13 VITALS
WEIGHT: 174 LBS | DIASTOLIC BLOOD PRESSURE: 86 MMHG | RESPIRATION RATE: 16 BRPM | HEART RATE: 72 BPM | SYSTOLIC BLOOD PRESSURE: 120 MMHG | HEIGHT: 65 IN | TEMPERATURE: 98.4 F | BODY MASS INDEX: 28.99 KG/M2 | OXYGEN SATURATION: 100 %

## 2019-08-13 DIAGNOSIS — F90.0 ATTENTION DEFICIT HYPERACTIVITY DISORDER (ADHD), PREDOMINANTLY INATTENTIVE TYPE: ICD-10-CM

## 2019-08-13 DIAGNOSIS — J01.00 ACUTE MAXILLARY SINUSITIS, RECURRENCE NOT SPECIFIED: Primary | ICD-10-CM

## 2019-08-13 PROCEDURE — 99213 OFFICE O/P EST LOW 20 MIN: CPT | Performed by: NURSE PRACTITIONER

## 2019-08-13 RX ORDER — CETIRIZINE HYDROCHLORIDE 10 MG/1
10 TABLET ORAL DAILY
Qty: 30 TABLET | Refills: 0
Start: 2019-08-13 | End: 2019-12-20

## 2019-08-13 RX ORDER — CEFDINIR 300 MG/1
600 CAPSULE ORAL DAILY
Qty: 20 CAPSULE | Refills: 0 | Status: SHIPPED | OUTPATIENT
Start: 2019-08-13 | End: 2019-09-25

## 2019-08-13 RX ORDER — DEXTROAMPHETAMINE SACCHARATE, AMPHETAMINE ASPARTATE MONOHYDRATE, DEXTROAMPHETAMINE SULFATE AND AMPHETAMINE SULFATE 3.75; 3.75; 3.75; 3.75 MG/1; MG/1; MG/1; MG/1
15 CAPSULE, EXTENDED RELEASE ORAL EVERY MORNING
Qty: 30 CAPSULE | Refills: 0
Start: 2019-08-13 | End: 2019-09-25 | Stop reason: DRUGHIGH

## 2019-08-13 NOTE — PROGRESS NOTES
Chief Complaint   Patient presents with   • URI   • Knee Pain   • Nasal Congestion   • Cough       Subjective   Elia Payne is a 43 y.o. female is being seen for an acute appointment for sore throat, nasal congestion, sinus pressure. This started 3 days ago after working in the yard over the weekend. Denies fever, cough, chest pain.     She is reporting that the higher dose of Adderall XR 20 mg is causing insomnia.  She feels focused during the day, but cannot tolerate it.     History of Present Illness     Current Outpatient Medications on File Prior to Visit   Medication Sig Dispense Refill   • ALPRAZolam (XANAX) 0.5 MG tablet Take 1 tablet by mouth 2 (Two) Times a Day As Needed for Anxiety. 30 tablet 0   • amphetamine-dextroamphetamine XR (ADDERALL XR) 20 MG 24 hr capsule Take 1 capsule by mouth Every Morning 30 capsule 0   • escitalopram (LEXAPRO) 20 MG tablet Take 1 tablet by mouth Daily. 90 tablet 3   • levonorgestrel (MIRENA) 20 MCG/24HR IUD by Intrauterine route.       No current facility-administered medications on file prior to visit.        The following portions of the patient's history were reviewed and updated as appropriate: allergies, current medications, past family history, past medical history, past social history, past surgical history and problem list.    Review of Systems   Constitutional: Negative.  Negative for fatigue and fever.   HENT: Positive for postnasal drip, rhinorrhea, sinus pressure, sinus pain and sore throat.    Cardiovascular: Negative.    Gastrointestinal: Negative.    Musculoskeletal: Positive for arthralgias.   Allergic/Immunologic: Positive for environmental allergies.   Psychiatric/Behavioral: The patient is nervous/anxious.        Objective   Physical Exam   Constitutional: She is oriented to person, place, and time. She appears well-developed and well-nourished.   HENT:   Head: Normocephalic.   Nose: Mucosal edema and rhinorrhea present. Right sinus exhibits  maxillary sinus tenderness and frontal sinus tenderness. Left sinus exhibits maxillary sinus tenderness and frontal sinus tenderness.   Neck: Neck supple.   Cardiovascular: Normal rate, regular rhythm and normal heart sounds.   No murmur heard.  Pulmonary/Chest: Effort normal and breath sounds normal. No stridor. No respiratory distress.   Musculoskeletal: She exhibits no edema.   Lymphadenopathy:     She has cervical adenopathy.   Neurological: She is alert and oriented to person, place, and time.   Skin: Skin is warm and dry.   Psychiatric: She has a normal mood and affect. Her behavior is normal.   Vitals reviewed.      Assessment/Plan        Diagnosis Plan   1. Acute maxillary sinusitis, recurrence not specified  cefdinir (OMNICEF) 300 MG capsule    cetirizine (zyrTEC) 10 MG tablet   2. Attention deficit hyperactivity disorder (ADHD), predominantly inattentive type  amphetamine-dextroamphetamine XR (ADDERALL XR) 15 MG 24 hr capsule     The patient has read and signed the [facility] Controlled Substance Contract.  I will continue to see patient for regular follow up appointments.  They are well controlled on their medication.  RIKKI is updated every 3 months. The patient is aware of the potential for addiction and dependence.    Follow up in 3 months

## 2019-08-15 ENCOUNTER — DOCUMENTATION (OUTPATIENT)
Dept: INTERNAL MEDICINE | Facility: CLINIC | Age: 44
End: 2019-08-15

## 2019-08-15 NOTE — PROGRESS NOTES
PT came in to doctors office worried due to the fact PT stated that she had taken 2 of her sons vyvanse 30 mg pills and 1 of her own 10 mg of adderrall. PT stated she was having palpitations and was very worried. PT sat in waiting room with mother until CMA spoke with TED. TED informed PT that PT would be ok. TED explained to PT to stay calm there could be side effects of palpitations, and tremors, but to drink plenty of water and she may also experience insomnia tonight. PT felt more at ease once APRN confirmed everything and PT mother stated PT was not driving and PT mother would care for PT through out the day.

## 2019-09-25 ENCOUNTER — OFFICE VISIT (OUTPATIENT)
Dept: INTERNAL MEDICINE | Facility: CLINIC | Age: 44
End: 2019-09-25

## 2019-09-25 VITALS
TEMPERATURE: 97.8 F | HEART RATE: 79 BPM | RESPIRATION RATE: 16 BRPM | SYSTOLIC BLOOD PRESSURE: 114 MMHG | OXYGEN SATURATION: 96 % | WEIGHT: 176 LBS | HEIGHT: 65 IN | DIASTOLIC BLOOD PRESSURE: 76 MMHG | BODY MASS INDEX: 29.32 KG/M2

## 2019-09-25 DIAGNOSIS — F90.0 ATTENTION DEFICIT HYPERACTIVITY DISORDER (ADHD), PREDOMINANTLY INATTENTIVE TYPE: Primary | ICD-10-CM

## 2019-09-25 DIAGNOSIS — Z23 FLU VACCINE NEED: ICD-10-CM

## 2019-09-25 DIAGNOSIS — F41.1 GAD (GENERALIZED ANXIETY DISORDER): ICD-10-CM

## 2019-09-25 PROCEDURE — 90471 IMMUNIZATION ADMIN: CPT | Performed by: NURSE PRACTITIONER

## 2019-09-25 PROCEDURE — 99214 OFFICE O/P EST MOD 30 MIN: CPT | Performed by: NURSE PRACTITIONER

## 2019-09-25 PROCEDURE — 90674 CCIIV4 VAC NO PRSV 0.5 ML IM: CPT | Performed by: NURSE PRACTITIONER

## 2019-09-25 RX ORDER — DEXTROAMPHETAMINE SACCHARATE, AMPHETAMINE ASPARTATE MONOHYDRATE, DEXTROAMPHETAMINE SULFATE AND AMPHETAMINE SULFATE 3.75; 3.75; 3.75; 3.75 MG/1; MG/1; MG/1; MG/1
15 CAPSULE, EXTENDED RELEASE ORAL EVERY MORNING
Qty: 30 CAPSULE | Refills: 0
Start: 2019-09-25 | End: 2019-10-18 | Stop reason: SDUPTHER

## 2019-09-25 RX ORDER — VITAMIN B COMPLEX
1 CAPSULE ORAL DAILY
Qty: 30 CAPSULE | Refills: 11
Start: 2019-09-25 | End: 2020-09-24

## 2019-09-25 RX ORDER — DEXTROAMPHETAMINE SACCHARATE, AMPHETAMINE ASPARTATE MONOHYDRATE, DEXTROAMPHETAMINE SULFATE AND AMPHETAMINE SULFATE 2.5; 2.5; 2.5; 2.5 MG/1; MG/1; MG/1; MG/1
CAPSULE, EXTENDED RELEASE ORAL
COMMUNITY
Start: 2019-07-08 | End: 2019-09-25

## 2019-09-25 RX ORDER — FOLIC ACID 1 MG/1
1 TABLET ORAL DAILY
Qty: 30 TABLET | Refills: 0
Start: 2019-09-25 | End: 2019-12-20

## 2019-09-25 NOTE — PATIENT INSTRUCTIONS
Peripheral Neuropathy  Peripheral neuropathy is a type of nerve damage. It affects nerves that carry signals between the spinal cord and the arms, legs, and the rest of the body (peripheral nerves). It does not affect nerves in the spinal cord or brain. In peripheral neuropathy, one nerve or a group of nerves may be damaged. Peripheral neuropathy is a broad category that includes many specific nerve disorders, like diabetic neuropathy, hereditary neuropathy, and carpal tunnel syndrome.  What are the causes?  This condition may be caused by:  · Diabetes. This is the most common cause of peripheral neuropathy.  · Nerve injury.  · Pressure or stress on a nerve that lasts a long time.  · Lack (deficiency) of B vitamins. This can result from alcoholism, poor diet, or a restricted diet.  · Infections.  · Autoimmune diseases, such as rheumatoid arthritis and systemic lupus erythematosus.  · Nerve diseases that are passed from parent to child (inherited).  · Some medicines, such as cancer medicines (chemotherapy).  · Poisonous (toxic) substances, such as lead and mercury.  · Too little blood flowing to the legs.  · Kidney disease.  · Thyroid disease.  In some cases, the cause of this condition is not known.  What are the signs or symptoms?  Symptoms of this condition depend on which of your nerves is damaged. Common symptoms include:  · Loss of feeling (numbness) in the feet, hands, or both.  · Tingling in the feet, hands, or both.  · Burning pain.  · Very sensitive skin.  · Weakness.  · Not being able to move a part of the body (paralysis).  · Muscle twitching.  · Clumsiness or poor coordination.  · Loss of balance.  · Not being able to control your bladder.  · Feeling dizzy.  · Sexual problems.  How is this diagnosed?  Diagnosing and finding the cause of peripheral neuropathy can be difficult. Your health care provider will take your medical history and do a physical exam. A neurological exam will also be done. This  involves checking things that are affected by your brain, spinal cord, and nerves (nervous system). For example, your health care provider will check your reflexes, how you move, and what you can feel.  You may have other tests, such as:  · Blood tests.  · Electromyogram (EMG) and nerve conduction tests. These tests check nerve function and how well the nerves are controlling the muscles.  · Imaging tests, such as CT scans or MRI to rule out other causes of your symptoms.  · Removing a small piece of nerve to be examined in a lab (nerve biopsy). This is rare.  · Removing and examining a small amount of the fluid that surrounds the brain and spinal cord (lumbar puncture). This is rare.  How is this treated?  Treatment for this condition may involve:  · Treating the underlying cause of the neuropathy, such as diabetes, kidney disease, or vitamin deficiencies.  · Stopping medicines that can cause neuropathy, such as chemotherapy.  · Medicine to relieve pain. Medicines may include:  ? Prescription or over-the-counter pain medicine.  ? Antiseizure medicine.  ? Antidepressants.  ? Pain-relieving patches that are applied to painful areas of skin.  · Surgery to relieve pressure on a nerve or to destroy a nerve that is causing pain.  · Physical therapy to help improve movement and balance.  · Devices to help you move around (assistive devices).  Follow these instructions at home:  Medicines  · Take over-the-counter and prescription medicines only as told by your health care provider. Do not take any other medicines without first asking your health care provider.  · Do not drive or use heavy machinery while taking prescription pain medicine.  Lifestyle    · Do not use any products that contain nicotine or tobacco, such as cigarettes and e-cigarettes. Smoking keeps blood from reaching damaged nerves. If you need help quitting, ask your health care provider.  · Avoid or limit alcohol. Too much alcohol can cause a vitamin B  deficiency, and vitamin B is needed for healthy nerves.  · Eat a healthy diet. This includes:  ? Eating foods that are high in fiber, such as fresh fruits and vegetables, whole grains, and beans.  ? Limiting foods that are high in fat and processed sugars, such as fried or sweet foods.  General instructions    · If you have diabetes, work closely with your health care provider to keep your blood sugar under control.  · If you have numbness in your feet:  ? Check every day for signs of injury or infection. Watch for redness, warmth, and swelling.  ? Wear padded socks and comfortable shoes. These help protect your feet.  · Develop a good support system. Living with peripheral neuropathy can be stressful. Consider talking with a mental health specialist or joining a support group.  · Use assistive devices and attend physical therapy as told by your health care provider. This may include using a walker or a cane.  · Keep all follow-up visits as told by your health care provider. This is important.  Contact a health care provider if:  · You have new signs or symptoms of peripheral neuropathy.  · You are struggling emotionally from dealing with peripheral neuropathy.  · Your pain is not well-controlled.  Get help right away if:  · You have an injury or infection that is not healing normally.  · You develop new weakness in an arm or leg.  · You fall frequently.  Summary  · Peripheral neuropathy is when the nerves in the arms, or legs are damaged, resulting in numbness, weakness, or pain.  · There are many causes of peripheral neuropathy, including diabetes, pinched nerves, vitamin deficiencies, autoimmune disease, and hereditary conditions.  · Diagnosing and finding the cause of peripheral neuropathy can be difficult. Your health care provider will take your medical history, do a physical exam, and do tests, including blood tests and nerve function tests.  · Treatment involves treating the underlying cause of the  neuropathy and taking medicines to help control pain. Physical therapy and assistive devices may also help.  This information is not intended to replace advice given to you by your health care provider. Make sure you discuss any questions you have with your health care provider.  Document Released: 12/08/2003 Document Revised: 02/26/2018 Document Reviewed: 02/26/2018  [employer] Patient Education © 2019 [employer]

## 2019-09-25 NOTE — PROGRESS NOTES
"Chief Complaint   Patient presents with   • Follow-up     3 x month f/u   • ADHD   • Foot Pain     R foot, same ankle that she broke 2 years ago, circulation issues, cramps, numbness        Subjective     Elia Payne is a 43 y.o. female being seen for a follow up appointment today regarding  ADD and XOCHILT. She  Is on Lexapro 20 mg and xanax as needed for anxiety. Her anxiety is situational with driving long distances. She is on Adderall XR 10mg daily, but would like to increase due to it \"wearing off.\" She denies heart palpitations, SOA, insomia.     She is reporting that she is having pain in right foot since she broke it and had surgery for an open reduction and fixation 50-. She describes it as \"rubber band sensation\" in toes. Associated numbness in ball of foot and toes. It is sensitive to touch. She bruises it easily, and its it frequently. No home remedies compelted. She took neuropathy in the past.       History of Present Illness     Allergies   Allergen Reactions   • Delsym Cgh-Chest Rodo Dm Child [Dextromethorphan-Guaifenesin] Nausea Only   • Synthroid [Levothyroxine Sodium] Other (See Comments)     TACHYCARDIA AND INSOMNIA   • Wellbutrin [Bupropion] Mental Status Change         Current Outpatient Medications:   •  ALPRAZolam (XANAX) 0.5 MG tablet, Take 1 tablet by mouth 2 (Two) Times a Day As Needed for Anxiety., Disp: 30 tablet, Rfl: 0  •  amphetamine-dextroamphetamine XR (ADDERALL XR) 10 MG 24 hr capsule, , Disp: , Rfl:   •  cetirizine (zyrTEC) 10 MG tablet, Take 1 tablet by mouth Daily., Disp: 30 tablet, Rfl: 0  •  escitalopram (LEXAPRO) 20 MG tablet, Take 1 tablet by mouth Daily., Disp: 90 tablet, Rfl: 3  •  levonorgestrel (MIRENA) 20 MCG/24HR IUD, by Intrauterine route., Disp: , Rfl:     The following portions of the patient's history were reviewed and updated as appropriate: allergies, current medications, past family history, past medical history, past social history, past surgical " history and problem list.    Review of Systems   Constitutional: Negative.    HENT: Negative.    Eyes: Negative.    Respiratory: Negative.    Genitourinary: Negative.    Musculoskeletal: Positive for arthralgias.   Neurological: Positive for numbness.   Psychiatric/Behavioral: Positive for decreased concentration. The patient is nervous/anxious.        Assessment     Physical Exam   Constitutional: She is oriented to person, place, and time. She appears well-developed and well-nourished.   Cardiovascular: Normal rate, regular rhythm and normal heart sounds.   No murmur heard.  Pulmonary/Chest: Effort normal and breath sounds normal. No respiratory distress.   Abdominal: Soft. Bowel sounds are normal.   Musculoskeletal:        Right ankle: She exhibits decreased range of motion and ecchymosis. She exhibits no swelling, no deformity and normal pulse.   DP/PT pulses 2 plus bilaterally.    Neurological: She is alert and oriented to person, place, and time.   Skin: Skin is warm and dry.   Psychiatric: She has a normal mood and affect. Her behavior is normal.   Vitals reviewed.      Plan     Her fasting labs were reviewed with the patient from last week.     Elia was seen today for follow-up, adhd and foot pain.    Diagnoses and all orders for this visit:    Attention deficit hyperactivity disorder (ADHD), predominantly inattentive type    XOCHILT (generalized anxiety disorder)    Flu vaccine need  -     Flucelvax Quad=>4Years (PFS)    Other orders  -     amphetamine-dextroamphetamine XR (ADDERALL XR) 15 MG 24 hr capsule; Take 1 capsule by mouth Every Morning  -     folic acid (FOLVITE) 1 MG tablet; Take 1 tablet by mouth Daily.  -     b complex vitamins capsule; Take 1 capsule by mouth Daily.        Discussed peripheral neuropathy. She will start OTC B complex and folic acid as above.     Increasing Adderall XR to 15 mg daily  The patient has read and signed the [facility] Controlled Substance Contract.  I will  continue to see patient for regular follow up appointments.  They are well controlled on their medication.  RIKKI is updated every 3 months. The patient is aware of the potential for addiction and dependence.    Follow up in 3 months

## 2019-10-18 RX ORDER — DEXTROAMPHETAMINE SACCHARATE, AMPHETAMINE ASPARTATE MONOHYDRATE, DEXTROAMPHETAMINE SULFATE AND AMPHETAMINE SULFATE 3.75; 3.75; 3.75; 3.75 MG/1; MG/1; MG/1; MG/1
15 CAPSULE, EXTENDED RELEASE ORAL EVERY MORNING
Qty: 30 CAPSULE | Refills: 0 | Status: SHIPPED | OUTPATIENT
Start: 2019-10-18 | End: 2019-11-27 | Stop reason: SDUPTHER

## 2019-11-27 RX ORDER — DEXTROAMPHETAMINE SACCHARATE, AMPHETAMINE ASPARTATE MONOHYDRATE, DEXTROAMPHETAMINE SULFATE AND AMPHETAMINE SULFATE 3.75; 3.75; 3.75; 3.75 MG/1; MG/1; MG/1; MG/1
15 CAPSULE, EXTENDED RELEASE ORAL EVERY MORNING
Qty: 30 CAPSULE | Refills: 0 | Status: SHIPPED | OUTPATIENT
Start: 2019-11-27 | End: 2019-12-20 | Stop reason: SDUPTHER

## 2019-12-20 ENCOUNTER — OFFICE VISIT (OUTPATIENT)
Dept: INTERNAL MEDICINE | Facility: CLINIC | Age: 44
End: 2019-12-20

## 2019-12-20 VITALS
WEIGHT: 185.5 LBS | DIASTOLIC BLOOD PRESSURE: 74 MMHG | BODY MASS INDEX: 30.91 KG/M2 | RESPIRATION RATE: 18 BRPM | OXYGEN SATURATION: 97 % | TEMPERATURE: 98.4 F | HEART RATE: 77 BPM | HEIGHT: 65 IN | SYSTOLIC BLOOD PRESSURE: 112 MMHG

## 2019-12-20 DIAGNOSIS — F41.1 GAD (GENERALIZED ANXIETY DISORDER): ICD-10-CM

## 2019-12-20 DIAGNOSIS — F90.0 ATTENTION DEFICIT HYPERACTIVITY DISORDER (ADHD), PREDOMINANTLY INATTENTIVE TYPE: Primary | ICD-10-CM

## 2019-12-20 DIAGNOSIS — E03.9 PRIMARY HYPOTHYROIDISM: Primary | ICD-10-CM

## 2019-12-20 PROCEDURE — 99213 OFFICE O/P EST LOW 20 MIN: CPT | Performed by: NURSE PRACTITIONER

## 2019-12-20 RX ORDER — DEXTROAMPHETAMINE SACCHARATE, AMPHETAMINE ASPARTATE MONOHYDRATE, DEXTROAMPHETAMINE SULFATE AND AMPHETAMINE SULFATE 3.75; 3.75; 3.75; 3.75 MG/1; MG/1; MG/1; MG/1
15 CAPSULE, EXTENDED RELEASE ORAL EVERY MORNING
Qty: 30 CAPSULE | Refills: 0 | Status: SHIPPED | OUTPATIENT
Start: 2019-12-20 | End: 2020-03-10 | Stop reason: SDUPTHER

## 2019-12-20 RX ORDER — DEXTROAMPHETAMINE SACCHARATE, AMPHETAMINE ASPARTATE MONOHYDRATE, DEXTROAMPHETAMINE SULFATE AND AMPHETAMINE SULFATE 3.75; 3.75; 3.75; 3.75 MG/1; MG/1; MG/1; MG/1
15 CAPSULE, EXTENDED RELEASE ORAL EVERY MORNING
Qty: 30 CAPSULE | Refills: 0 | Status: CANCELLED | OUTPATIENT
Start: 2019-12-20

## 2019-12-20 NOTE — PROGRESS NOTES
Chief Complaint   Patient presents with   • Attention deficit hyperactivity disorder (ADHD), predominant     3 month follow up        Subjective     Elia Payne is a 44 y.o. female being seen for a follow up appointment today regarding  ADD and XOCHILT. She is on Adderall XR 15 mg daily for ADD. She reports that her symptoms are well controlled, her focus and concentration is doing well.     She is seeing a psychologist for anxiety. She is Lexapro 20 mg daily, and Xanax 0.5mg daily prn. She is using the Xanax sparingly with last reported use about 1 month ago.        History of Present Illness     Allergies   Allergen Reactions   • Delsym Cgh-Chest Rodo Dm Child [Dextromethorphan-Guaifenesin] Nausea Only   • Synthroid [Levothyroxine Sodium] Other (See Comments)     TACHYCARDIA AND INSOMNIA   • Wellbutrin [Bupropion] Mental Status Change         Current Outpatient Medications:   •  ALPRAZolam (XANAX) 0.5 MG tablet, Take 1 tablet by mouth 2 (Two) Times a Day As Needed for Anxiety., Disp: 30 tablet, Rfl: 0  •  amphetamine-dextroamphetamine XR (ADDERALL XR) 15 MG 24 hr capsule, Take 1 capsule by mouth Every Morning, Disp: 30 capsule, Rfl: 0  •  escitalopram (LEXAPRO) 20 MG tablet, Take 1 tablet by mouth Daily., Disp: 90 tablet, Rfl: 3  •  levonorgestrel (MIRENA) 20 MCG/24HR IUD, by Intrauterine route., Disp: , Rfl:   •  b complex vitamins capsule, Take 1 capsule by mouth Daily., Disp: 30 capsule, Rfl: 11  •  cetirizine (zyrTEC) 10 MG tablet, Take 1 tablet by mouth Daily., Disp: 30 tablet, Rfl: 0    The following portions of the patient's history were reviewed and updated as appropriate: allergies, current medications, past family history, past medical history, past social history, past surgical history and problem list.    Review of Systems   Constitutional: Negative.    HENT: Negative.    Eyes: Negative.    Respiratory: Negative.  Negative for shortness of breath, wheezing and stridor.    Cardiovascular:  Negative.  Negative for chest pain and leg swelling.   Gastrointestinal: Negative.    Endocrine: Negative.    Genitourinary: Negative.    Musculoskeletal: Negative.    Allergic/Immunologic: Negative.    Neurological: Negative.    Hematological: Negative.    Psychiatric/Behavioral: Negative for dysphoric mood. The patient is nervous/anxious.        Assessment     Physical Exam   Constitutional: She is oriented to person, place, and time. She appears well-developed and well-nourished.   HENT:   Head: Normocephalic.   Right Ear: External ear normal.   Left Ear: External ear normal.   Neck: Neck supple. No thyromegaly present.   Cardiovascular: Normal rate, regular rhythm and normal heart sounds.   No murmur heard.  Pulmonary/Chest: Effort normal and breath sounds normal. No stridor. No respiratory distress.   Neurological: She is alert and oriented to person, place, and time.   Skin: Skin is warm.   Psychiatric: She has a normal mood and affect. Her behavior is normal.   Vitals reviewed.      Plan     Her fasting labs were reviewed with the patient from last week.      Diagnosis Plan   1. Primary hypothyroidism     2. XOCHILT (generalized anxiety disorder)         The patient has read and signed the [facility] Controlled Substance Contract.  I will continue to see patient for regular follow up appointments.  They are well controlled on their medication.  RIKKI is updated every 3 months. The patient is aware of the potential for addiction and dependence.    Follow up in 3 months

## 2020-03-10 DIAGNOSIS — F90.0 ATTENTION DEFICIT HYPERACTIVITY DISORDER (ADHD), PREDOMINANTLY INATTENTIVE TYPE: ICD-10-CM

## 2020-03-10 RX ORDER — DEXTROAMPHETAMINE SACCHARATE, AMPHETAMINE ASPARTATE MONOHYDRATE, DEXTROAMPHETAMINE SULFATE AND AMPHETAMINE SULFATE 3.75; 3.75; 3.75; 3.75 MG/1; MG/1; MG/1; MG/1
15 CAPSULE, EXTENDED RELEASE ORAL EVERY MORNING
Qty: 30 CAPSULE | Refills: 0 | Status: SHIPPED | OUTPATIENT
Start: 2020-03-10 | End: 2020-05-14 | Stop reason: SDUPTHER

## 2020-03-20 DIAGNOSIS — F41.1 GAD (GENERALIZED ANXIETY DISORDER): ICD-10-CM

## 2020-03-20 RX ORDER — ESCITALOPRAM OXALATE 20 MG/1
TABLET ORAL
Qty: 90 TABLET | Refills: 2 | Status: SHIPPED | OUTPATIENT
Start: 2020-03-20 | End: 2021-01-04

## 2020-05-14 DIAGNOSIS — F90.0 ATTENTION DEFICIT HYPERACTIVITY DISORDER (ADHD), PREDOMINANTLY INATTENTIVE TYPE: ICD-10-CM

## 2020-05-14 RX ORDER — DEXTROAMPHETAMINE SACCHARATE, AMPHETAMINE ASPARTATE MONOHYDRATE, DEXTROAMPHETAMINE SULFATE AND AMPHETAMINE SULFATE 3.75; 3.75; 3.75; 3.75 MG/1; MG/1; MG/1; MG/1
15 CAPSULE, EXTENDED RELEASE ORAL EVERY MORNING
Qty: 30 CAPSULE | Refills: 0 | OUTPATIENT
Start: 2020-05-14

## 2020-05-14 NOTE — TELEPHONE ENCOUNTER
PT IS REQUESTING A REFILL ON amphetamine-dextroamphetamine XR (ADDERALL XR) 15 MG 24 hr capsule    [facility] CONFIRMED.

## 2020-05-19 RX ORDER — DEXTROAMPHETAMINE SACCHARATE, AMPHETAMINE ASPARTATE MONOHYDRATE, DEXTROAMPHETAMINE SULFATE AND AMPHETAMINE SULFATE 3.75; 3.75; 3.75; 3.75 MG/1; MG/1; MG/1; MG/1
15 CAPSULE, EXTENDED RELEASE ORAL EVERY MORNING
Qty: 30 CAPSULE | Refills: 0 | Status: SHIPPED | OUTPATIENT
Start: 2020-05-19 | End: 2021-04-20

## 2021-01-03 DIAGNOSIS — F41.1 GAD (GENERALIZED ANXIETY DISORDER): ICD-10-CM

## 2021-01-04 RX ORDER — ESCITALOPRAM OXALATE 20 MG/1
TABLET ORAL
Qty: 90 TABLET | Refills: 1 | Status: SHIPPED | OUTPATIENT
Start: 2021-01-04 | End: 2021-03-15

## 2021-03-15 ENCOUNTER — OFFICE VISIT (OUTPATIENT)
Dept: INTERNAL MEDICINE | Facility: CLINIC | Age: 46
End: 2021-03-15

## 2021-03-15 VITALS
SYSTOLIC BLOOD PRESSURE: 122 MMHG | DIASTOLIC BLOOD PRESSURE: 84 MMHG | WEIGHT: 203 LBS | OXYGEN SATURATION: 99 % | BODY MASS INDEX: 33.82 KG/M2 | HEART RATE: 70 BPM | TEMPERATURE: 98.7 F | RESPIRATION RATE: 18 BRPM | HEIGHT: 65 IN

## 2021-03-15 DIAGNOSIS — Z11.59 NEED FOR HEPATITIS C SCREENING TEST: ICD-10-CM

## 2021-03-15 DIAGNOSIS — F41.8 DEPRESSION WITH ANXIETY: Primary | ICD-10-CM

## 2021-03-15 PROCEDURE — 99213 OFFICE O/P EST LOW 20 MIN: CPT | Performed by: NURSE PRACTITIONER

## 2021-03-15 RX ORDER — ESCITALOPRAM OXALATE 10 MG/1
10 TABLET ORAL DAILY
Qty: 7 TABLET | Refills: 0
Start: 2021-03-15 | End: 2021-04-19

## 2021-03-15 RX ORDER — DESVENLAFAXINE SUCCINATE 50 MG/1
50 TABLET, EXTENDED RELEASE ORAL DAILY
Qty: 30 TABLET | Refills: 0 | Status: SHIPPED | OUTPATIENT
Start: 2021-03-15 | End: 2021-04-19 | Stop reason: SDUPTHER

## 2021-03-15 NOTE — PROGRESS NOTES
"Chief Complaint   Patient presents with   • Heartburn     Been going on for about a week. Pt states that it has subsided    • Rapid Heart Rate     Pt states that when she is excercing that her HR gets up to 175 and that is alarming to her        Subjective     Elia Payne is a 45 y.o. female being seen for a Follow up on depression with anxiety. Above problems she staes \"resolved on Friday and I did not want to cancel.\" She denies heartburn and reports \"I just wanted to make sure that was my heart rate range for exercise.\" She wants to discuss anxiety and depression.  She has XOCHILT with panic disorder, which she has controlled with lexapro. She has not had a panic attack in 6 months. She has poor energy and increased appetite.  She has gained 20 pounds in the last 6 months despite doing [place] [facility] 4 days week. She previously saw Duran Rain for counseling.  She was evaluated by Dr. Buckley in 2018 for hypothyroidism. She had hair loss from levothyroxine and tirosint.     History of Present Illness     Allergies   Allergen Reactions   • Delsym Cgh-Chest Rodo Dm Child [Dextromethorphan-Guaifenesin] Nausea Only   • Synthroid [Levothyroxine Sodium] Other (See Comments)     TACHYCARDIA AND INSOMNIA   • Wellbutrin [Bupropion] Mental Status Change         Current Outpatient Medications:   •  ALPRAZolam (XANAX) 0.5 MG tablet, Take 1 tablet by mouth 2 (Two) Times a Day As Needed for Anxiety., Disp: 30 tablet, Rfl: 0  •  amphetamine-dextroamphetamine XR (ADDERALL XR) 15 MG 24 hr capsule, Take 1 capsule by mouth Every Morning, Disp: 30 capsule, Rfl: 0  •  escitalopram (LEXAPRO) 20 MG tablet, TAKE ONE TABLET BY MOUTH DAILY, Disp: 90 tablet, Rfl: 1  •  levonorgestrel (MIRENA) 20 MCG/24HR IUD, by Intrauterine route., Disp: , Rfl:     The following portions of the patient's history were reviewed and updated as appropriate: allergies, current medications, past family history, past medical history, past social history, past " surgical history and problem list.    Review of Systems   Constitutional: Positive for unexpected weight change (20 pounds).   Gastrointestinal: Negative.    Endocrine: Negative.    Musculoskeletal: Negative.    Skin: Negative.    Hematological: Negative.    Psychiatric/Behavioral: Positive for decreased concentration and dysphoric mood. Negative for sleep disturbance.       Assessment     Physical Exam  Vitals reviewed.   Constitutional:       Appearance: Normal appearance.   Cardiovascular:      Rate and Rhythm: Normal rate and regular rhythm.      Pulses: Normal pulses.      Heart sounds: No murmur.   Pulmonary:      Effort: Pulmonary effort is normal.      Breath sounds: Normal breath sounds. No stridor.   Musculoskeletal:      Cervical back: Neck supple.   Neurological:      Mental Status: She is alert.   Psychiatric:         Attention and Perception: Attention normal.         Mood and Affect: Affect normal. Mood is depressed. Mood is not anxious.         Speech: Speech normal.         Cognition and Memory: Cognition normal.      Comments: See PHQ-9         Plan         Diagnoses and all orders for this visit:    1. Depression with anxiety (Primary)  Comments:  Reduce Lexapro to 10mg for 7 days, then stop. Start Pristiq 50mg daily due to depression s/s. She declines cousnleior at this time.  Orders:  -     TSH  -     T4, free  -     Comprehensive metabolic panel  -     CBC w AUTO Differential  -     escitalopram (LEXAPRO) 10 MG tablet; Take 1 tablet by mouth Daily.  Dispense: 7 tablet; Refill: 0  -     desvenlafaxine (Pristiq) 50 MG 24 hr tablet; Take 1 tablet by mouth Daily.  Dispense: 30 tablet; Refill: 0    2. Need for hepatitis C screening test  -     Hepatitis C Antibody        Follow up in 4 weeks

## 2021-03-16 LAB
ALBUMIN SERPL-MCNC: 4.3 G/DL (ref 3.5–5.2)
ALBUMIN/GLOB SERPL: 1.7 G/DL
ALP SERPL-CCNC: 106 U/L (ref 39–117)
ALT SERPL-CCNC: 20 U/L (ref 1–33)
AST SERPL-CCNC: 22 U/L (ref 1–32)
BASOPHILS # BLD AUTO: 0.03 10*3/MM3 (ref 0–0.2)
BASOPHILS NFR BLD AUTO: 0.7 % (ref 0–1.5)
BILIRUB SERPL-MCNC: 0.2 MG/DL (ref 0–1.2)
BUN SERPL-MCNC: 11 MG/DL (ref 6–20)
BUN/CREAT SERPL: 15.5 (ref 7–25)
CALCIUM SERPL-MCNC: 9.3 MG/DL (ref 8.6–10.5)
CHLORIDE SERPL-SCNC: 102 MMOL/L (ref 98–107)
CO2 SERPL-SCNC: 28.1 MMOL/L (ref 22–29)
CREAT SERPL-MCNC: 0.71 MG/DL (ref 0.57–1)
EOSINOPHIL # BLD AUTO: 0.13 10*3/MM3 (ref 0–0.4)
EOSINOPHIL NFR BLD AUTO: 3.1 % (ref 0.3–6.2)
ERYTHROCYTE [DISTWIDTH] IN BLOOD BY AUTOMATED COUNT: 13.5 % (ref 12.3–15.4)
GLOBULIN SER CALC-MCNC: 2.6 GM/DL
GLUCOSE SERPL-MCNC: 92 MG/DL (ref 65–99)
HCT VFR BLD AUTO: 42.1 % (ref 34–46.6)
HCV AB S/CO SERPL IA: <0.1 S/CO RATIO (ref 0–0.9)
HGB BLD-MCNC: 13.9 G/DL (ref 12–15.9)
IMM GRANULOCYTES # BLD AUTO: 0.01 10*3/MM3 (ref 0–0.05)
IMM GRANULOCYTES NFR BLD AUTO: 0.2 % (ref 0–0.5)
LYMPHOCYTES # BLD AUTO: 1.36 10*3/MM3 (ref 0.7–3.1)
LYMPHOCYTES NFR BLD AUTO: 32.2 % (ref 19.6–45.3)
MCH RBC QN AUTO: 28.2 PG (ref 26.6–33)
MCHC RBC AUTO-ENTMCNC: 33 G/DL (ref 31.5–35.7)
MCV RBC AUTO: 85.4 FL (ref 79–97)
MONOCYTES # BLD AUTO: 0.34 10*3/MM3 (ref 0.1–0.9)
MONOCYTES NFR BLD AUTO: 8 % (ref 5–12)
NEUTROPHILS # BLD AUTO: 2.36 10*3/MM3 (ref 1.7–7)
NEUTROPHILS NFR BLD AUTO: 55.8 % (ref 42.7–76)
NRBC BLD AUTO-RTO: 0 /100 WBC (ref 0–0.2)
PLATELET # BLD AUTO: 296 10*3/MM3 (ref 140–450)
POTASSIUM SERPL-SCNC: 4.4 MMOL/L (ref 3.5–5.2)
PROT SERPL-MCNC: 6.9 G/DL (ref 6–8.5)
RBC # BLD AUTO: 4.93 10*6/MM3 (ref 3.77–5.28)
SODIUM SERPL-SCNC: 139 MMOL/L (ref 136–145)
T4 FREE SERPL-MCNC: 0.84 NG/DL (ref 0.93–1.7)
TSH SERPL DL<=0.005 MIU/L-ACNC: 3.69 UIU/ML (ref 0.27–4.2)
WBC # BLD AUTO: 4.23 10*3/MM3 (ref 3.4–10.8)

## 2021-04-15 DIAGNOSIS — F41.8 DEPRESSION WITH ANXIETY: ICD-10-CM

## 2021-04-16 RX ORDER — DESVENLAFAXINE SUCCINATE 50 MG/1
TABLET, EXTENDED RELEASE ORAL
Qty: 30 TABLET | Refills: 0 | OUTPATIENT
Start: 2021-04-16

## 2021-04-19 DIAGNOSIS — F41.8 DEPRESSION WITH ANXIETY: ICD-10-CM

## 2021-04-19 RX ORDER — DESVENLAFAXINE SUCCINATE 50 MG/1
50 TABLET, EXTENDED RELEASE ORAL DAILY
Qty: 30 TABLET | Refills: 3 | Status: SHIPPED | OUTPATIENT
Start: 2021-04-19 | End: 2021-04-20

## 2021-04-19 RX ORDER — DESVENLAFAXINE SUCCINATE 50 MG/1
50 TABLET, EXTENDED RELEASE ORAL DAILY
Qty: 30 TABLET | Refills: 0 | Status: SHIPPED | OUTPATIENT
Start: 2021-04-19 | End: 2021-04-20

## 2021-04-19 NOTE — ADDENDUM NOTE
Addended by: TABITHA HARRISON on: 4/19/2021 03:02 PM     Modules accepted: Orders     Patient states she isn't sure if she's having a UTI but she's been experiencing blood in her urine. Please call patient back when able to discuss and advise.

## 2021-04-19 NOTE — TELEPHONE ENCOUNTER
Caller: Elia Payne    Relationship: Self    Best call back number: [phone]     Medication needed:   Requested Prescriptions     Pending Prescriptions Disp Refills   • desvenlafaxine (Pristiq) 50 MG 24 hr tablet 30 tablet 0     Sig: Take 1 tablet by mouth Daily.       When do you need the refill by: ASAP    What additional details did the patient provide when requesting the medication: PATIENT HAS APPOINTMENT TOMORROW, HAS BEEN OUT FOR 3 DAYS    Does the patient have less than a 3 day supply:  [x] Yes  [] No    [place] [facility] [place] [address] [facility] - [phone]  - [phone]

## 2021-04-20 ENCOUNTER — OFFICE VISIT (OUTPATIENT)
Dept: INTERNAL MEDICINE | Facility: CLINIC | Age: 46
End: 2021-04-20

## 2021-04-20 VITALS
HEART RATE: 70 BPM | SYSTOLIC BLOOD PRESSURE: 112 MMHG | OXYGEN SATURATION: 98 % | RESPIRATION RATE: 16 BRPM | BODY MASS INDEX: 32.32 KG/M2 | WEIGHT: 194 LBS | HEIGHT: 65 IN | TEMPERATURE: 98.7 F | DIASTOLIC BLOOD PRESSURE: 74 MMHG

## 2021-04-20 DIAGNOSIS — F41.1 GAD (GENERALIZED ANXIETY DISORDER): Primary | ICD-10-CM

## 2021-04-20 PROBLEM — F41.8 DEPRESSION WITH ANXIETY: Status: RESOLVED | Noted: 2021-03-15 | Resolved: 2021-04-20

## 2021-04-20 PROBLEM — Z11.59 NEED FOR HEPATITIS C SCREENING TEST: Status: RESOLVED | Noted: 2021-03-15 | Resolved: 2021-04-20

## 2021-04-20 PROCEDURE — 99213 OFFICE O/P EST LOW 20 MIN: CPT | Performed by: NURSE PRACTITIONER

## 2021-04-20 RX ORDER — DESVENLAFAXINE SUCCINATE 50 MG/1
50 TABLET, EXTENDED RELEASE ORAL DAILY
Qty: 90 TABLET | Refills: 3 | Status: SHIPPED | OUTPATIENT
Start: 2021-04-20 | End: 2021-05-10

## 2021-04-20 NOTE — PROGRESS NOTES
Chief Complaint   Patient presents with   • Hypothyroidism   • Anxiety   • Depression       Subjective     Elia Payne is a 45 y.o. female being seen for a follow up appointment today regarding XOCHILT. She is on Pristoq 50mg daily, but ran out 3 days ago. She is tolerating it wel. Her anxiety has been well controlled. She has not taken xanax . She alejandra panic attacks. She alejandra excess worry. She is not driving much for work.        History of Present Illness     Allergies   Allergen Reactions   • Delsym Cgh-Chest Rodo Dm Child [Dextromethorphan-Guaifenesin] Nausea Only   • Synthroid [Levothyroxine Sodium] Other (See Comments)     TACHYCARDIA AND INSOMNIA   • Wellbutrin [Bupropion] Mental Status Change         Current Outpatient Medications:   •  ALPRAZolam (XANAX) 0.5 MG tablet, Take 1 tablet by mouth 2 (Two) Times a Day As Needed for Anxiety., Disp: 30 tablet, Rfl: 0  •  desvenlafaxine (Pristiq) 50 MG 24 hr tablet, Take 1 tablet by mouth Daily for 120 days., Disp: 90 tablet, Rfl: 3  •  levonorgestrel (MIRENA) 20 MCG/24HR IUD, by Intrauterine route., Disp: , Rfl:     The following portions of the patient's history were reviewed and updated as appropriate: allergies, current medications, past family history, past medical history, past social history, past surgical history and problem list.    Review of Systems   Constitutional: Negative.    HENT: Negative.    Eyes: Negative.    Respiratory: Negative.    Cardiovascular: Negative.  Negative for chest pain, palpitations and leg swelling.   Endocrine: Negative.    Musculoskeletal: Negative.    Allergic/Immunologic: Negative.  Negative for environmental allergies and food allergies.   Neurological: Negative.    Hematological: Negative.    Psychiatric/Behavioral: Positive for agitation and sleep disturbance. The patient is nervous/anxious.        Assessment     Physical Exam  Vitals reviewed.   Constitutional:       Appearance: Normal appearance.   Cardiovascular:       Rate and Rhythm: Normal rate and regular rhythm.      Pulses: Normal pulses.      Heart sounds: No murmur heard.     Pulmonary:      Effort: Pulmonary effort is normal. No respiratory distress.      Breath sounds: Normal breath sounds. No stridor.   Musculoskeletal:         General: No swelling.      Cervical back: Neck supple.   Skin:     General: Skin is warm and dry.   Neurological:      General: No focal deficit present.      Mental Status: She is alert.   Psychiatric:         Mood and Affect: Mood normal.         Behavior: Behavior normal.         Thought Content: Thought content normal.         Plan        Diagnosis Plan   1. XOCHILT (generalized anxiety disorder)  desvenlafaxine (Pristiq) 50 MG 24 hr tablet     Anxiety well controlled on Pristiq, no recent use of Xanax.    The patient has read and signed the [facility] Controlled Substance Contract.  I will continue to see patient for regular follow up appointments.  They are well controlled on their medication.  RIKKI is updated every 3 months. The patient is aware of the potential for addiction and dependence.    Follow up in 6 months w labs

## 2021-05-04 ENCOUNTER — TELEPHONE (OUTPATIENT)
Dept: INTERNAL MEDICINE | Facility: CLINIC | Age: 46
End: 2021-05-04

## 2021-05-10 ENCOUNTER — OFFICE VISIT (OUTPATIENT)
Dept: INTERNAL MEDICINE | Facility: CLINIC | Age: 46
End: 2021-05-10

## 2021-05-10 VITALS
HEART RATE: 82 BPM | SYSTOLIC BLOOD PRESSURE: 130 MMHG | OXYGEN SATURATION: 95 % | DIASTOLIC BLOOD PRESSURE: 72 MMHG | TEMPERATURE: 97.3 F | WEIGHT: 194.8 LBS | BODY MASS INDEX: 32.46 KG/M2 | RESPIRATION RATE: 18 BRPM | HEIGHT: 65 IN

## 2021-05-10 DIAGNOSIS — F41.8 DEPRESSION WITH ANXIETY: Primary | ICD-10-CM

## 2021-05-10 PROBLEM — E03.9 PRIMARY HYPOTHYROIDISM: Status: RESOLVED | Noted: 2018-06-13 | Resolved: 2021-05-10

## 2021-05-10 PROBLEM — T83.39XA RETAINED INTRAUTERINE CONTRACEPTIVE DEVICE (IUD): Status: ACTIVE | Noted: 2020-10-28

## 2021-05-10 PROCEDURE — 99213 OFFICE O/P EST LOW 20 MIN: CPT | Performed by: NURSE PRACTITIONER

## 2021-05-10 RX ORDER — DESVENLAFAXINE 100 MG/1
100 TABLET, EXTENDED RELEASE ORAL DAILY
Qty: 90 TABLET | Refills: 3 | Status: SHIPPED | OUTPATIENT
Start: 2021-05-10 | End: 2022-01-20 | Stop reason: SDUPTHER

## 2021-05-10 NOTE — PROGRESS NOTES
Chief Complaint   Patient presents with   • Fatigue       Subjective     Elia Payne is a 45 y.o. female being seen for a follow up appointment today regarding XOCHILT. She is reporting that her son, Gene has been dealing with depression. This is stressing her out. She is having reduced concentration, decreased  Motivation. She denies any recent panic attacks, or use of Xanax. She is having troouble making the correct parenting decisions.       History of Present Illness     Allergies   Allergen Reactions   • Delsym Cgh-Chest Rodo Dm Child [Dextromethorphan-Guaifenesin] Nausea Only   • Synthroid [Levothyroxine Sodium] Other (See Comments)     TACHYCARDIA AND INSOMNIA   • Wellbutrin [Bupropion] Mental Status Change         Current Outpatient Medications:   •  ALPRAZolam (XANAX) 0.5 MG tablet, Take 1 tablet by mouth 2 (Two) Times a Day As Needed for Anxiety., Disp: 30 tablet, Rfl: 0  •  desvenlafaxine (Pristiq) 50 MG 24 hr tablet, Take 1 tablet by mouth Daily for 120 days., Disp: 90 tablet, Rfl: 3    The following portions of the patient's history were reviewed and updated as appropriate: allergies, current medications, past family history, past medical history, past social history, past surgical history and problem list.    Review of Systems   Constitutional: Negative.    HENT: Negative.    Eyes: Negative.    Respiratory: Negative.    Cardiovascular: Negative.  Negative for chest pain, palpitations and leg swelling.   Gastrointestinal: Negative.    Endocrine: Negative.    Genitourinary: Negative.    Musculoskeletal: Negative.    Allergic/Immunologic: Negative.    Neurological: Negative.    Hematological: Negative.    Psychiatric/Behavioral: Positive for decreased concentration and dysphoric mood. The patient is nervous/anxious.    All other systems reviewed and are negative.      Assessment     Physical Exam  Vitals reviewed.   Constitutional:       Appearance: Normal appearance.   Cardiovascular:      Rate  and Rhythm: Normal rate and regular rhythm.      Pulses: Normal pulses.      Heart sounds: Normal heart sounds. No murmur heard.     Pulmonary:      Effort: Pulmonary effort is normal. No respiratory distress.      Breath sounds: Normal breath sounds. No stridor.   Musculoskeletal:         General: No swelling.      Cervical back: Neck supple.   Skin:     General: Skin is warm and dry.   Neurological:      Mental Status: She is alert and oriented to person, place, and time.   Psychiatric:         Mood and Affect: Mood normal.         Behavior: Behavior normal.         Thought Content: Thought content normal.         Plan         Diagnoses and all orders for this visit:    1. Depression with anxiety (Primary)  -     desvenlafaxine (Pristiq) 100 MG 24 hr tablet; Take 1 tablet by mouth Daily for 120 days.  Dispense: 90 tablet; Refill: 3    Refer to psychology to discuss concerns    Increase Pristiq 100 mg daily.    Follow up in 6 months

## 2022-01-20 ENCOUNTER — OFFICE VISIT (OUTPATIENT)
Dept: INTERNAL MEDICINE | Facility: CLINIC | Age: 47
End: 2022-01-20

## 2022-01-20 VITALS
HEIGHT: 65 IN | OXYGEN SATURATION: 98 % | WEIGHT: 195.4 LBS | SYSTOLIC BLOOD PRESSURE: 120 MMHG | RESPIRATION RATE: 18 BRPM | TEMPERATURE: 96.8 F | BODY MASS INDEX: 32.55 KG/M2 | HEART RATE: 81 BPM | DIASTOLIC BLOOD PRESSURE: 76 MMHG

## 2022-01-20 DIAGNOSIS — F41.8 DEPRESSION WITH ANXIETY: ICD-10-CM

## 2022-01-20 DIAGNOSIS — J45.990 EXERCISE-INDUCED ASTHMA: Primary | ICD-10-CM

## 2022-01-20 DIAGNOSIS — F98.8 ATTENTION DEFICIT DISORDER (ADD) WITHOUT HYPERACTIVITY: ICD-10-CM

## 2022-01-20 PROCEDURE — 99214 OFFICE O/P EST MOD 30 MIN: CPT | Performed by: NURSE PRACTITIONER

## 2022-01-20 RX ORDER — LORATADINE 10 MG/1
10 TABLET ORAL DAILY
Qty: 30 TABLET | Refills: 0
Start: 2022-01-20

## 2022-01-20 RX ORDER — DESVENLAFAXINE 100 MG/1
100 TABLET, EXTENDED RELEASE ORAL DAILY
Qty: 90 TABLET | Refills: 0 | Status: SHIPPED | OUTPATIENT
Start: 2022-01-20 | End: 2022-03-03

## 2022-01-20 RX ORDER — MONTELUKAST SODIUM 10 MG/1
10 TABLET ORAL NIGHTLY
Qty: 30 TABLET | Refills: 3 | Status: SHIPPED | OUTPATIENT
Start: 2022-01-20 | End: 2022-06-12

## 2022-01-20 RX ORDER — DEXTROAMPHETAMINE SACCHARATE, AMPHETAMINE ASPARTATE MONOHYDRATE, DEXTROAMPHETAMINE SULFATE AND AMPHETAMINE SULFATE 3.75; 3.75; 3.75; 3.75 MG/1; MG/1; MG/1; MG/1
15 CAPSULE, EXTENDED RELEASE ORAL EVERY MORNING
Start: 2022-01-20 | End: 2022-04-13

## 2022-01-20 NOTE — PROGRESS NOTES
"Chief Complaint   Patient presents with   • Cough     2-3 months       Subjective     Elia Payne is a 46 y.o. female being seen for an acute visit for cough for 2-3 months. Cough described as a \"deep\" nonproductive cough. Occurs once a week, usually on Mondays. This was precipitated by getting new 2 new cats. She denies wheezing or SOA. She tried OTC claritin with no help.       She would also like to resume her Adderall XR 15mg . She was diagnosed with ADD with psychology, and previously saw Duran Rain for depression and anxiety. She is well maintained on Pristiq for this, but feels like she has lack of focus, poor motivation and trouble with task completion. She was on Adderall XR 15, but stopped this 2 years ago, because she did not want to take a daily medicine for ADD .    History of Present Illness     Allergies   Allergen Reactions   • Delsym Cgh-Chest Rodo Dm Child [Dextromethorphan-Guaifenesin] Nausea Only   • Synthroid [Levothyroxine Sodium] Other (See Comments)     TACHYCARDIA AND INSOMNIA   • Wellbutrin [Bupropion] Mental Status Change         Current Outpatient Medications:   •  ALPRAZolam (XANAX) 0.5 MG tablet, Take 1 tablet by mouth 2 (Two) Times a Day As Needed for Anxiety., Disp: 30 tablet, Rfl: 0  •  desvenlafaxine (Pristiq) 100 MG 24 hr tablet, Take 1 tablet by mouth Daily for 120 days., Disp: 90 tablet, Rfl: 3    The following portions of the patient's history were reviewed and updated as appropriate: allergies, current medications, past family history, past medical history, past social history, past surgical history and problem list.    Review of Systems   Constitutional: Negative.    HENT: Negative.    Eyes: Negative.    Respiratory: Positive for cough. Negative for shortness of breath, wheezing and stridor.    Cardiovascular: Negative.  Negative for chest pain, palpitations and leg swelling.   Gastrointestinal: Negative.    Endocrine: Negative.    Musculoskeletal: Negative.  Negative " for arthralgias.   Skin: Negative.    Allergic/Immunologic: Negative.    Neurological: Negative.    Hematological: Negative.  Negative for adenopathy.   Psychiatric/Behavioral: Positive for decreased concentration. Negative for dysphoric mood. The patient is nervous/anxious.    All other systems reviewed and are negative.      Assessment     Physical Exam  Vitals reviewed.   Constitutional:       Appearance: Normal appearance.   HENT:      Head: Normocephalic.   Cardiovascular:      Rate and Rhythm: Normal rate and regular rhythm.      Pulses: Normal pulses.      Heart sounds: Normal heart sounds. No murmur heard.      Pulmonary:      Effort: Pulmonary effort is normal. No respiratory distress.      Breath sounds: Normal breath sounds. No stridor.   Musculoskeletal:      Cervical back: Neck supple.      Right lower leg: No edema.      Left lower leg: No edema.   Skin:     General: Skin is warm and dry.   Neurological:      General: No focal deficit present.      Mental Status: She is alert and oriented to person, place, and time.   Psychiatric:         Mood and Affect: Mood normal.         Behavior: Behavior normal.         Thought Content: Thought content normal.         Plan         Diagnoses and all orders for this visit:    1. Exercise-induced asthma (Primary)  Comments:  Start Claritin 10mg daily for 2 weeks and Singulair 10mg nightly  Orders:  -     loratadine (Claritin) 10 MG tablet; Take 1 tablet by mouth Daily.  Dispense: 30 tablet; Refill: 0  -     montelukast (Singulair) 10 MG tablet; Take 1 tablet by mouth Every Night.  Dispense: 30 tablet; Refill: 3    2. Attention deficit disorder (ADD) without hyperactivity  -     amphetamine-dextroamphetamine XR (Adderall XR) 15 MG 24 hr capsule; Take 1 capsule by mouth Every Morning    3. Depression with anxiety  -     desvenlafaxine (Pristiq) 100 MG 24 hr tablet; Take 1 tablet by mouth Daily for 120 days.  Dispense: 90 tablet; Refill: 0      The patient has read  and signed the [facility] Controlled Substance Contract.  I will continue to see patient for regular follow up appointments.  They are well controlled on their medication.  RIKKI is updated every 3 months. The patient is aware of the potential for addiction and dependence.    Follow up in 3 months

## 2022-03-03 ENCOUNTER — TELEPHONE (OUTPATIENT)
Dept: INTERNAL MEDICINE | Facility: CLINIC | Age: 47
End: 2022-03-03

## 2022-03-03 DIAGNOSIS — F41.8 DEPRESSION WITH ANXIETY: ICD-10-CM

## 2022-03-03 RX ORDER — DESVENLAFAXINE SUCCINATE 50 MG/1
50 TABLET, EXTENDED RELEASE ORAL DAILY
Qty: 90 TABLET | Refills: 1 | Status: SHIPPED | OUTPATIENT
Start: 2022-03-03 | End: 2022-05-11 | Stop reason: SDUPTHER

## 2022-03-03 NOTE — TELEPHONE ENCOUNTER
I called the patient about this, I had seen that she had been on this medication since May of 2021. She said that she feels that the dosage on this is too high as well as she thinks that it is contributing to her tiredness. Would it be ok if she cut the dosage in half?

## 2022-03-03 NOTE — TELEPHONE ENCOUNTER
Yes, she may reduce the dose, may 1/2 the Pristiq tablets she has. New script for 50mg sent to her pharmacy as well.

## 2022-03-03 NOTE — TELEPHONE ENCOUNTER
Caller: Elia Payne    Relationship to patient: Self    Best call back number: [phone]    Patient is needing: PT IS CALLING IN REGARDS TO MEDICATION desvenlafaxine (Pristiq) 100 MG 24 hr tablet SHE STATES THAT SHE THINKS THE DOSE IS TOO HIGH AND WOULD LIKE TO ASK IF ITS OKAY FOR HER TO SPLIT IN HALF AND SEE IF THAT WORKS FOR HER. PLEASE CALL PT WHEN POSSIBLE

## 2022-04-13 ENCOUNTER — OFFICE VISIT (OUTPATIENT)
Dept: INTERNAL MEDICINE | Facility: CLINIC | Age: 47
End: 2022-04-13

## 2022-04-13 ENCOUNTER — HOSPITAL ENCOUNTER (OUTPATIENT)
Dept: GENERAL RADIOLOGY | Facility: HOSPITAL | Age: 47
Discharge: HOME | End: 2022-04-13
Admitting: INTERNAL MEDICINE

## 2022-04-13 VITALS
DIASTOLIC BLOOD PRESSURE: 80 MMHG | SYSTOLIC BLOOD PRESSURE: 115 MMHG | OXYGEN SATURATION: 97 % | HEART RATE: 88 BPM | WEIGHT: 211 LBS | HEIGHT: 65 IN | BODY MASS INDEX: 35.16 KG/M2

## 2022-04-13 DIAGNOSIS — J30.1 SEASONAL ALLERGIC RHINITIS DUE TO POLLEN: ICD-10-CM

## 2022-04-13 DIAGNOSIS — R05.3 CHRONIC COUGH: Primary | ICD-10-CM

## 2022-04-13 DIAGNOSIS — J02.9 SORE THROAT: ICD-10-CM

## 2022-04-13 DIAGNOSIS — R05.3 CHRONIC COUGH: ICD-10-CM

## 2022-04-13 DIAGNOSIS — K21.9 GASTROESOPHAGEAL REFLUX DISEASE, UNSPECIFIED WHETHER ESOPHAGITIS PRESENT: ICD-10-CM

## 2022-04-13 DIAGNOSIS — J45.990 EXERCISE-INDUCED ASTHMA: ICD-10-CM

## 2022-04-13 LAB
EXPIRATION DATE: ABNORMAL
FLUAV AG UPPER RESP QL IA.RAPID: DETECTED
FLUBV AG UPPER RESP QL IA.RAPID: NOT DETECTED
INTERNAL CONTROL: ABNORMAL
Lab: ABNORMAL
SARS-COV-2 AG UPPER RESP QL IA.RAPID: NOT DETECTED

## 2022-04-13 PROCEDURE — 71046 X-RAY EXAM CHEST 2 VIEWS: CPT

## 2022-04-13 PROCEDURE — 99214 OFFICE O/P EST MOD 30 MIN: CPT | Performed by: INTERNAL MEDICINE

## 2022-04-13 PROCEDURE — 87428 SARSCOV & INF VIR A&B AG IA: CPT | Performed by: INTERNAL MEDICINE

## 2022-04-13 RX ORDER — OMEPRAZOLE 40 MG/1
40 CAPSULE, DELAYED RELEASE ORAL DAILY
Qty: 30 CAPSULE | Refills: 0 | Status: SHIPPED | OUTPATIENT
Start: 2022-04-13 | End: 2022-05-11 | Stop reason: SDUPTHER

## 2022-04-13 RX ORDER — FLUTICASONE PROPIONATE 50 MCG
2 SPRAY, SUSPENSION (ML) NASAL DAILY
Qty: 16 G | Refills: 1 | Status: SHIPPED | OUTPATIENT
Start: 2022-04-13

## 2022-04-13 NOTE — PROGRESS NOTES
Elia Payne is a 46 y.o. female, who presents with a chief complaint of   Chief Complaint   Patient presents with   • Illness   • Sore Throat   • Chest Pain           HPI   Pt is new to me.  She is here bc of 6 months of coughing.  Sometimes she coughs so hard she will throw up.  She feels like there is a brick on her chest at times.  Sometimes the cough is very deep.  She says when people have listened to her chest they tell her it is clear.  She got a steroid shot and zithromax a couple of weeks ago at [facility] and was given an inhaler.  The inhaler does help some.  She has a hx of allergies and gerd.  She is not on any meds that should cause a chronic cough.  Pt does snore at night.    Depression with anxiety - on pristiq.  It was making her tired so she changed to pm dosing.     The following portions of the patient's history were reviewed and updated as appropriate: allergies, current medications, past family history, past medical history, past social history, past surgical history and problem list.    Allergies: Delsym cgh-chest stacy dm child [dextromethorphan-guaifenesin], Synthroid [levothyroxine sodium], and Wellbutrin [bupropion]    Review of Systems   Constitutional: Negative.  Negative for fever.   HENT: Positive for congestion.    Eyes: Negative.    Respiratory: Positive for cough. Negative for shortness of breath.    Cardiovascular: Positive for chest pain.   Gastrointestinal:        Heartburn   Endocrine: Negative.    Genitourinary: Negative.    Musculoskeletal: Negative.    Skin: Negative.    Allergic/Immunologic: Negative.    Neurological: Negative.    Hematological: Negative.    Psychiatric/Behavioral: Negative.    All other systems reviewed and are negative.            Wt Readings from Last 3 Encounters:   04/13/22 95.7 kg (211 lb)   01/25/22 95.3 kg (210 lb)   01/20/22 88.6 kg (195 lb 6.4 oz)     Temp Readings from Last 3 Encounters:   01/25/22 97.7 °F (36.5 °C) (Infrared)   01/20/22  96.8 °F (36 °C) (Temporal)   05/10/21 97.3 °F (36.3 °C) (Temporal)     BP Readings from Last 3 Encounters:   04/13/22 115/80   01/25/22 118/87   01/20/22 120/76     Pulse Readings from Last 3 Encounters:   04/13/22 88   01/25/22 90   01/20/22 81     Body mass index is 35.11 kg/m².  SpO2 Readings from Last 3 Encounters:   04/13/22 97%   01/25/22 99%   01/20/22 98%          Physical Exam  Vitals and nursing note reviewed.   Constitutional:       General: She is not in acute distress.     Appearance: She is well-developed.   HENT:      Head: Normocephalic and atraumatic.      Right Ear: External ear normal.      Left Ear: External ear normal.      Nose: Nose normal.   Eyes:      Conjunctiva/sclera: Conjunctivae normal.      Pupils: Pupils are equal, round, and reactive to light.   Cardiovascular:      Rate and Rhythm: Normal rate and regular rhythm.      Heart sounds: Normal heart sounds.   Pulmonary:      Effort: Pulmonary effort is normal. No respiratory distress.      Breath sounds: Normal breath sounds. No wheezing.   Musculoskeletal:         General: Normal range of motion.      Cervical back: Normal range of motion and neck supple.      Comments: Normal gait   Skin:     General: Skin is warm and dry.   Neurological:      Mental Status: She is alert and oriented to person, place, and time.   Psychiatric:         Behavior: Behavior normal.         Thought Content: Thought content normal.         Judgment: Judgment normal.         Results for orders placed or performed in visit on 04/13/22   POCT SARS-CoV-2 Antigen TIARA + Flu    Specimen: Swab   Result Value Ref Range    SARS Antigen Not Detected Not Detected    Influenza A Antigen TIARA Detected     Influenza B Antigen TIARA Not Detected     Internal Control Passed Passed    Lot Number [id number]     Expiration Date 2/19/23      Result Review :                  Assessment and Plan    Diagnoses and all orders for this visit:    1. Chronic cough (Primary)  -     XR Chest  PA & Lateral; Future    2. Sore throat  -     POCT SARS-CoV-2 Antigen TIARA + Flu    3. Exercise-induced asthma - use albuterol prn    4. Gastroesophageal reflux disease, unspecified whether esophagitis present  -     omeprazole (priLOSEC) 40 MG capsule; Take 1 capsule by mouth Daily.  Dispense: 30 capsule; Refill: 0    5. Seasonal allergic rhinitis due to pollen  -     fluticasone (Flonase) 50 MCG/ACT nasal spray; 2 sprays into the nostril(s) as directed by provider Daily.  Dispense: 16 g; Refill: 1             Outpatient Medications Prior to Visit   Medication Sig Dispense Refill   • desvenlafaxine (Pristiq) 50 MG 24 hr tablet Take 1 tablet by mouth Daily for 120 days. 90 tablet 1   • loratadine (Claritin) 10 MG tablet Take 1 tablet by mouth Daily. 30 tablet 0   • montelukast (Singulair) 10 MG tablet Take 1 tablet by mouth Every Night. 30 tablet 3   • amphetamine-dextroamphetamine XR (Adderall XR) 15 MG 24 hr capsule Take 1 capsule by mouth Every Morning     • azithromycin (ZITHROMAX) 250 MG tablet Take 2 tablets the first day, then 1 tablet daily for 4 days. 6 tablet 0     No facility-administered medications prior to visit.     New Medications Ordered This Visit   Medications   • omeprazole (priLOSEC) 40 MG capsule     Sig: Take 1 capsule by mouth Daily.     Dispense:  30 capsule     Refill:  0   • fluticasone (Flonase) 50 MCG/ACT nasal spray     Si sprays into the nostril(s) as directed by provider Daily.     Dispense:  16 g     Refill:  1     [unfilled]  Medications Discontinued During This Encounter   Medication Reason   • azithromycin (ZITHROMAX) 250 MG tablet    • amphetamine-dextroamphetamine XR (Adderall XR) 15 MG 24 hr capsule          Return in about 4 weeks (around 2022) for Recheck.    Patient was given instructions and counseling regarding her condition or for health maintenance advice. Please see specific information pulled into the AVS if appropriate.

## 2022-05-11 ENCOUNTER — OFFICE VISIT (OUTPATIENT)
Dept: INTERNAL MEDICINE | Facility: CLINIC | Age: 47
End: 2022-05-11

## 2022-05-11 ENCOUNTER — TELEPHONE (OUTPATIENT)
Dept: INTERNAL MEDICINE | Facility: CLINIC | Age: 47
End: 2022-05-11

## 2022-05-11 VITALS
WEIGHT: 203 LBS | OXYGEN SATURATION: 98 % | HEIGHT: 65 IN | SYSTOLIC BLOOD PRESSURE: 110 MMHG | TEMPERATURE: 97.1 F | HEART RATE: 81 BPM | DIASTOLIC BLOOD PRESSURE: 80 MMHG | BODY MASS INDEX: 33.82 KG/M2

## 2022-05-11 DIAGNOSIS — F41.8 DEPRESSION WITH ANXIETY: ICD-10-CM

## 2022-05-11 DIAGNOSIS — F90.0 ATTENTION DEFICIT HYPERACTIVITY DISORDER (ADHD), PREDOMINANTLY INATTENTIVE TYPE: Primary | ICD-10-CM

## 2022-05-11 DIAGNOSIS — Z79.899 DRUG THERAPY: Primary | ICD-10-CM

## 2022-05-11 DIAGNOSIS — K21.9 GASTROESOPHAGEAL REFLUX DISEASE, UNSPECIFIED WHETHER ESOPHAGITIS PRESENT: ICD-10-CM

## 2022-05-11 PROCEDURE — 99214 OFFICE O/P EST MOD 30 MIN: CPT | Performed by: NURSE PRACTITIONER

## 2022-05-11 RX ORDER — DEXTROAMPHETAMINE SACCHARATE, AMPHETAMINE ASPARTATE MONOHYDRATE, DEXTROAMPHETAMINE SULFATE AND AMPHETAMINE SULFATE 3.75; 3.75; 3.75; 3.75 MG/1; MG/1; MG/1; MG/1
15 CAPSULE, EXTENDED RELEASE ORAL EVERY MORNING
Qty: 30 CAPSULE | Refills: 0
Start: 2022-05-11 | End: 2022-05-11 | Stop reason: SDUPTHER

## 2022-05-11 RX ORDER — DEXTROAMPHETAMINE SACCHARATE, AMPHETAMINE ASPARTATE MONOHYDRATE, DEXTROAMPHETAMINE SULFATE AND AMPHETAMINE SULFATE 3.75; 3.75; 3.75; 3.75 MG/1; MG/1; MG/1; MG/1
15 CAPSULE, EXTENDED RELEASE ORAL EVERY MORNING
Qty: 30 CAPSULE | Refills: 0
Start: 2022-05-11 | End: 2022-05-24 | Stop reason: SDUPTHER

## 2022-05-11 RX ORDER — DESVENLAFAXINE SUCCINATE 50 MG/1
50 TABLET, EXTENDED RELEASE ORAL DAILY
Qty: 90 TABLET | Refills: 1 | Status: SHIPPED | OUTPATIENT
Start: 2022-05-11 | End: 2022-09-08

## 2022-05-11 RX ORDER — OMEPRAZOLE 40 MG/1
40 CAPSULE, DELAYED RELEASE ORAL DAILY
Qty: 90 CAPSULE | Refills: 1 | Status: SHIPPED | OUTPATIENT
Start: 2022-05-11 | End: 2022-10-12 | Stop reason: SDUPTHER

## 2022-05-11 NOTE — PROGRESS NOTES
Chief Complaint   Patient presents with   • Med Management     3 WEEK F/U       Subjective     Elia Payne is a 46 y.o. female being seen for a follow up appointment today regarding ADHD, GERD, and XOCHILT. She would also like to resume her Adderall XR 15mg , which was prescribed, but never received from the office.  She was diagnosed with ADD with psychology, and previously saw Duran Rain for depression and anxiety. She is well maintained on Pristiq for this, but feels like she has lack of focus, poor motivation and trouble with task completion. She was on Adderall XR 15, previously because she did not want to take a daily medicine for ADD .    She was in the office 3 weeks ago for cough. A CXR, Covid test and flu test were completed.  She was placed on Omeprazole 40mg, which has completely resolved her symptoms. It relieved her cough and indigestion.     She has a history of exercise induced asthma.This is controlled on Singulair, claritin and flonase.      History of Present Illness     Allergies   Allergen Reactions   • Delsym Cgh-Chest Rodo Dm Child [Dextromethorphan-Guaifenesin] Nausea Only   • Synthroid [Levothyroxine Sodium] Other (See Comments)     TACHYCARDIA AND INSOMNIA   • Wellbutrin [Bupropion] Mental Status Change         Current Outpatient Medications:   •  desvenlafaxine (Pristiq) 50 MG 24 hr tablet, Take 1 tablet by mouth Daily for 120 days., Disp: 90 tablet, Rfl: 1  •  fluticasone (Flonase) 50 MCG/ACT nasal spray, 2 sprays into the nostril(s) as directed by provider Daily., Disp: 16 g, Rfl: 1  •  loratadine (Claritin) 10 MG tablet, Take 1 tablet by mouth Daily., Disp: 30 tablet, Rfl: 0  •  montelukast (Singulair) 10 MG tablet, Take 1 tablet by mouth Every Night., Disp: 30 tablet, Rfl: 3  •  omeprazole (priLOSEC) 40 MG capsule, Take 1 capsule by mouth Daily., Disp: 30 capsule, Rfl: 0  •  amphetamine-dextroamphetamine XR (Adderall XR) 15 MG 24 hr capsule, Take 1 capsule by mouth Every Morning,  Disp: 30 capsule, Rfl: 0    The following portions of the patient's history were reviewed and updated as appropriate: allergies, current medications, past family history, past medical history, past social history, past surgical history and problem list.    Review of Systems   Constitutional: Negative.    HENT: Negative.    Respiratory: Negative.  Negative for wheezing.    Cardiovascular: Negative.  Negative for chest pain, palpitations and leg swelling.   Gastrointestinal: Negative.    Endocrine: Negative.    Musculoskeletal: Negative.    Allergic/Immunologic: Positive for environmental allergies.   Neurological: Negative.    Hematological: Negative.    Psychiatric/Behavioral: Positive for decreased concentration. The patient is nervous/anxious.    All other systems reviewed and are negative.      Assessment     Physical Exam  Vitals reviewed.   Constitutional:       Appearance: Normal appearance. She is obese.   HENT:      Head: Normocephalic.      Nose: Nose normal.   Cardiovascular:      Rate and Rhythm: Normal rate and regular rhythm.      Pulses: Normal pulses.      Heart sounds: Normal heart sounds. No murmur heard.  Pulmonary:      Effort: Pulmonary effort is normal. No respiratory distress.      Breath sounds: Normal breath sounds. No stridor.   Musculoskeletal:      Right lower leg: No edema.      Left lower leg: No edema.   Skin:     General: Skin is warm and dry.   Neurological:      General: No focal deficit present.      Mental Status: She is alert and oriented to person, place, and time.   Psychiatric:         Mood and Affect: Mood normal.         Behavior: Behavior normal.         Thought Content: Thought content normal.         Plan         Diagnoses and all orders for this visit:    1. Attention deficit hyperactivity disorder (ADHD), predominantly inattentive type (Primary)  -     Discontinue: amphetamine-dextroamphetamine XR (Adderall XR) 15 MG 24 hr capsule; Take 1 capsule by mouth Every Morning   Dispense: 30 capsule; Refill: 0  -     amphetamine-dextroamphetamine XR (Adderall XR) 15 MG 24 hr capsule; Take 1 capsule by mouth Every Morning  Dispense: 30 capsule; Refill: 0    2. Gastroesophageal reflux disease, unspecified whether esophagitis present  -     omeprazole (priLOSEC) 40 MG capsule; Take 1 capsule by mouth Daily.  Dispense: 90 capsule; Refill: 1    3. Depression with anxiety  -     desvenlafaxine (Pristiq) 50 MG 24 hr tablet; Take 1 tablet by mouth Daily for 120 days.  Dispense: 90 tablet; Refill: 1    Unable to void today for screening    The patient has read and signed the [facility] Controlled Substance Contract.  I will continue to see patient for regular follow up appointments.  They are well controlled on their medication.  RIKKI is updated every 3 months. The patient is aware of the potential for addiction and dependence.    Follow up in 3 months

## 2022-05-24 DIAGNOSIS — F90.0 ATTENTION DEFICIT HYPERACTIVITY DISORDER (ADHD), PREDOMINANTLY INATTENTIVE TYPE: ICD-10-CM

## 2022-05-24 RX ORDER — DEXTROAMPHETAMINE SACCHARATE, AMPHETAMINE ASPARTATE MONOHYDRATE, DEXTROAMPHETAMINE SULFATE AND AMPHETAMINE SULFATE 3.75; 3.75; 3.75; 3.75 MG/1; MG/1; MG/1; MG/1
15 CAPSULE, EXTENDED RELEASE ORAL EVERY MORNING
Qty: 30 CAPSULE | Refills: 0
Start: 2022-05-24 | End: 2022-05-24 | Stop reason: SDUPTHER

## 2022-05-24 RX ORDER — DEXTROAMPHETAMINE SACCHARATE, AMPHETAMINE ASPARTATE MONOHYDRATE, DEXTROAMPHETAMINE SULFATE AND AMPHETAMINE SULFATE 3.75; 3.75; 3.75; 3.75 MG/1; MG/1; MG/1; MG/1
15 CAPSULE, EXTENDED RELEASE ORAL EVERY MORNING
Qty: 30 CAPSULE | Refills: 0 | Status: SHIPPED | OUTPATIENT
Start: 2022-05-24 | End: 2022-08-12 | Stop reason: SDUPTHER

## 2022-05-24 NOTE — TELEPHONE ENCOUNTER
Caller: Elia Payne    Relationship: Self    Best call back number: [phone]    Requested Prescriptions:   Requested Prescriptions     Pending Prescriptions Disp Refills   • amphetamine-dextroamphetamine XR (Adderall XR) 15 MG 24 hr capsule 30 capsule 0     Sig: Take 1 capsule by mouth Every Morning        Pharmacy where request should be sent:  [facility] [address] [place] [address] [address] [facility] [address] - [phone]  - [phone] FX    Additional details provided by patient: PATIENT IS OUT OF THIS MEDICATION. PATIENT IS HAVING TROUBLE GETTING IT FILLED.     Does the patient have less than a 3 day supply:  [x] Yes  [] No    Marian Torres Rep   05/24/22 08:35 EDT

## 2022-08-12 ENCOUNTER — OFFICE VISIT (OUTPATIENT)
Dept: INTERNAL MEDICINE | Facility: CLINIC | Age: 47
End: 2022-08-12

## 2022-08-12 VITALS
HEIGHT: 66 IN | WEIGHT: 192 LBS | SYSTOLIC BLOOD PRESSURE: 128 MMHG | OXYGEN SATURATION: 96 % | TEMPERATURE: 97.1 F | BODY MASS INDEX: 30.86 KG/M2 | DIASTOLIC BLOOD PRESSURE: 76 MMHG | HEART RATE: 78 BPM

## 2022-08-12 DIAGNOSIS — F90.0 ATTENTION DEFICIT HYPERACTIVITY DISORDER (ADHD), PREDOMINANTLY INATTENTIVE TYPE: ICD-10-CM

## 2022-08-12 DIAGNOSIS — F41.8 DEPRESSION WITH ANXIETY: ICD-10-CM

## 2022-08-12 DIAGNOSIS — K21.9 GASTROESOPHAGEAL REFLUX DISEASE WITHOUT ESOPHAGITIS: ICD-10-CM

## 2022-08-12 DIAGNOSIS — F98.8 ATTENTION DEFICIT DISORDER (ADD) WITHOUT HYPERACTIVITY: Primary | ICD-10-CM

## 2022-08-12 PROCEDURE — 99214 OFFICE O/P EST MOD 30 MIN: CPT | Performed by: NURSE PRACTITIONER

## 2022-08-12 RX ORDER — DEXTROAMPHETAMINE SACCHARATE, AMPHETAMINE ASPARTATE MONOHYDRATE, DEXTROAMPHETAMINE SULFATE AND AMPHETAMINE SULFATE 3.75; 3.75; 3.75; 3.75 MG/1; MG/1; MG/1; MG/1
15 CAPSULE, EXTENDED RELEASE ORAL EVERY MORNING
Qty: 30 CAPSULE | Refills: 0 | Status: SHIPPED | OUTPATIENT
Start: 2022-08-12 | End: 2022-10-17 | Stop reason: SDUPTHER

## 2022-08-12 NOTE — TELEPHONE ENCOUNTER
Pt requesting refill of amphetamine-dextroamphetamine XR (Adderall XR) 15 MG 24 hr capsule [11983]    [facility] [place] [place] [facility] [address] [facility] [address] - [phone]  - [phone] FX [address], [place] [address]    Male

## 2022-08-12 NOTE — TELEPHONE ENCOUNTER
Rx Refill Note  Requested Prescriptions     Pending Prescriptions Disp Refills   • amphetamine-dextroamphetamine XR (Adderall XR) 15 MG 24 hr capsule 30 capsule 0     Sig: Take 1 capsule by mouth Every Morning      Last office visit with prescribing clinician: 8/12/2022      Next office visit with prescribing clinician: Visit date not found            MÓNICA WARNER MA  08/12/22, 09:40 EDT

## 2022-08-12 NOTE — PROGRESS NOTES
Chief Complaint   Patient presents with   • ADHD     F/U       Subjective     Elia Payne is a 46 y.o. female being seen for a follow up appointment today regarding ADHD, GERD, and XOCHILT. She was diagnosed with ADD with psychology, and previously saw Duran Rain for depression and anxiety. She reports that she has felt better than she has in a long time,. Better focus, improved motivation.     She is well maintained on Pristiq for this. She denies any depression symptoms.    She is on omeprazole daily for GERD. She only has break thru symtpoms if she misses meds. She denies abd pain, reflux.     She is awaiting an allergy eval.     History of Present Illness     Allergies   Allergen Reactions   • Delsym Cgh-Chest Rodo Dm Child [Dextromethorphan-Guaifenesin] Nausea Only   • Synthroid [Levothyroxine Sodium] Other (See Comments)     TACHYCARDIA AND INSOMNIA   • Wellbutrin [Bupropion] Mental Status Change         Current Outpatient Medications:   •  amphetamine-dextroamphetamine XR (Adderall XR) 15 MG 24 hr capsule, Take 1 capsule by mouth Every Morning, Disp: 30 capsule, Rfl: 0  •  desvenlafaxine (Pristiq) 50 MG 24 hr tablet, Take 1 tablet by mouth Daily for 120 days., Disp: 90 tablet, Rfl: 1  •  omeprazole (priLOSEC) 40 MG capsule, Take 1 capsule by mouth Daily., Disp: 90 capsule, Rfl: 1  •  benzonatate (TESSALON) 200 MG capsule, Take 1 capsule by mouth 3 (Three) Times a Day As Needed for Cough., Disp: 30 capsule, Rfl: 0  •  fluticasone (Flonase) 50 MCG/ACT nasal spray, 2 sprays into the nostril(s) as directed by provider Daily., Disp: 16 g, Rfl: 1  •  loratadine (Claritin) 10 MG tablet, Take 1 tablet by mouth Daily., Disp: 30 tablet, Rfl: 0    The following portions of the patient's history were reviewed and updated as appropriate: allergies, current medications, past family history, past medical history, past social history, past surgical history and problem list.    Review of Systems   Constitutional:  Negative.    HENT: Negative.    Eyes: Negative.    Respiratory: Negative.    Cardiovascular: Negative.  Negative for chest pain and leg swelling.   Gastrointestinal: Negative.  Negative for abdominal distention and abdominal pain.   Endocrine: Negative.    Musculoskeletal: Negative.  Negative for arthralgias and back pain.   Skin: Negative.    Allergic/Immunologic: Positive for environmental allergies.   Neurological: Negative.    Hematological: Negative.    Psychiatric/Behavioral: Negative.  Negative for agitation, behavioral problems, decreased concentration and sleep disturbance.   All other systems reviewed and are negative.      Assessment     Physical Exam  Vitals reviewed.   Constitutional:       Appearance: Normal appearance.   Cardiovascular:      Rate and Rhythm: Normal rate and regular rhythm.      Pulses: Normal pulses.      Heart sounds: Normal heart sounds. No murmur heard.  Pulmonary:      Effort: Pulmonary effort is normal. No respiratory distress.      Breath sounds: Normal breath sounds. No stridor.   Musculoskeletal:      Cervical back: Neck supple.      Right lower leg: No edema.      Left lower leg: No edema.   Skin:     General: Skin is warm and dry.   Neurological:      General: No focal deficit present.      Mental Status: She is alert and oriented to person, place, and time.   Psychiatric:         Mood and Affect: Mood normal.         Behavior: Behavior normal.         Thought Content: Thought content normal.         Plan         Diagnoses and all orders for this visit:    1. Attention deficit disorder (ADD) without hyperactivity (Primary)  Comments:  Adderall XR 15mg daily    2. Depression with anxiety  Comments:  Well coontrolled on pristiq 50mg    3. Gastroesophageal reflux disease without esophagitis  Comments:  On Omeprazole 40mg daily      The patient has read and signed the [facility] Controlled Substance Contract.  I will continue to see patient for regular follow up  appointments.  They are well controlled on their medication.  RIKKI is updated every 3 months. The patient is aware of the potential for addiction and dependence.    Follow up in 3 months

## 2022-09-29 DIAGNOSIS — K21.9 GASTROESOPHAGEAL REFLUX DISEASE, UNSPECIFIED WHETHER ESOPHAGITIS PRESENT: ICD-10-CM

## 2022-09-30 RX ORDER — OMEPRAZOLE 40 MG/1
CAPSULE, DELAYED RELEASE ORAL
Qty: 90 CAPSULE | Refills: 1 | OUTPATIENT
Start: 2022-09-30

## 2022-10-12 DIAGNOSIS — K21.9 GASTROESOPHAGEAL REFLUX DISEASE, UNSPECIFIED WHETHER ESOPHAGITIS PRESENT: ICD-10-CM

## 2022-10-12 DIAGNOSIS — F90.0 ATTENTION DEFICIT HYPERACTIVITY DISORDER (ADHD), PREDOMINANTLY INATTENTIVE TYPE: ICD-10-CM

## 2022-10-12 RX ORDER — DEXTROAMPHETAMINE SACCHARATE, AMPHETAMINE ASPARTATE MONOHYDRATE, DEXTROAMPHETAMINE SULFATE AND AMPHETAMINE SULFATE 3.75; 3.75; 3.75; 3.75 MG/1; MG/1; MG/1; MG/1
15 CAPSULE, EXTENDED RELEASE ORAL EVERY MORNING
Qty: 30 CAPSULE | Refills: 0 | OUTPATIENT
Start: 2022-10-12

## 2022-10-12 RX ORDER — OMEPRAZOLE 40 MG/1
40 CAPSULE, DELAYED RELEASE ORAL DAILY
Qty: 90 CAPSULE | Refills: 1 | Status: SHIPPED | OUTPATIENT
Start: 2022-10-12 | End: 2022-11-18 | Stop reason: SDUPTHER

## 2022-10-12 NOTE — TELEPHONE ENCOUNTER
Caller: Elia Payne    Relationship: Self    Best call back number: [phone]      Requested Prescriptions:   Requested Prescriptions     Pending Prescriptions Disp Refills   • amphetamine-dextroamphetamine XR (Adderall XR) 15 MG 24 hr capsule 30 capsule 0     Sig: Take 1 capsule by mouth Every Morning   • omeprazole (priLOSEC) 40 MG capsule 90 capsule 1     Sig: Take 1 capsule by mouth Daily.        Pharmacy where request should be sent: [facility] [id number] [place], [place] - [address] [facility] [address] - [phone]  - [phone] FX     Additional details provided by patient: HAS LESS THAN 3 DAYS.    Does the patient have less than a 3 day supply:  [x] Yes  [] No    COLT Esqueda   10/12/22 08:18 EDT

## 2022-10-12 NOTE — TELEPHONE ENCOUNTER
Rx Refill Note  Requested Prescriptions     Pending Prescriptions Disp Refills    amphetamine-dextroamphetamine XR (Adderall XR) 15 MG 24 hr capsule 30 capsule 0     Sig: Take 1 capsule by mouth Every Morning    omeprazole (priLOSEC) 40 MG capsule 90 capsule 1     Sig: Take 1 capsule by mouth Daily.      Last office visit with prescribing clinician: 8/12/2022      Next office visit with prescribing clinician: 11/18/2022            Suzan Hwang MA  10/12/22, 09:55 EDT

## 2022-10-14 NOTE — TELEPHONE ENCOUNTER
Caller: ElmerLuisleftylisbeth    Relationship: Self    Best call back number: [phone]     What is the best time to reach you: ANYTIME    Who are you requesting to speak with (clinical staff, provider,  specific staff member): CLINICAL     Do you know the name of the person who called: CALVIN      What was the call regarding: CALVIN WOULD LIKE TO CHECK STATUS OF REFILLS REQUESTED 10/12/2022, SHE IS CURRENTLY OUT OF omeprazole, amphetamine-dextroamphetamine XR     Do you require a callback: YES

## 2022-10-17 RX ORDER — DEXTROAMPHETAMINE SACCHARATE, AMPHETAMINE ASPARTATE MONOHYDRATE, DEXTROAMPHETAMINE SULFATE AND AMPHETAMINE SULFATE 3.75; 3.75; 3.75; 3.75 MG/1; MG/1; MG/1; MG/1
15 CAPSULE, EXTENDED RELEASE ORAL EVERY MORNING
Qty: 30 CAPSULE | Refills: 0 | Status: SHIPPED | OUTPATIENT
Start: 2022-10-17 | End: 2022-12-14 | Stop reason: SDUPTHER

## 2022-10-17 NOTE — ADDENDUM NOTE
14  DAY STM VISIT    Diagnostic AHI:  29.3 HST    Subjective measures:   Patient has a sore on the side of his nose/cheek. He said the mask leaks sometimes when he's on his side, but he's able to correct it easily. He hasn't noticed any benefit from mask yet, but he's been working a lot of hours lately.    Assessment: Pt not meeting objective benchmarks for leak Patient failing following subjective benchmarks: not feeling benefit from therapy    Action plan: pt to have 30 day STM visit.      Device type: Auto-CPAP    PAP settings: CPAP min 5.0 cm  H20       CPAP max 15.0 cm  H20      95th% pressure 10.2 cm  H20        RESMED EPR level Setting: TWO    RESMED Soft response setting:  OFF    Mask type:  Nasal Mask    Objective measures: 14 day rolling measures      Compliance  85 %      Leak  34.8  lpm  last  upload      AHI 1.05   last  upload      Average number of minutes 358      Objective measure goal  Compliance   Goal >70%  Leak   Goal < 24 lpm  AHI  Goal < 5  Usage  Goal >240        Total time spent on accessing and interpreting remote patient PAP therapy data  2 minutes    Total time spent counseling, coaching  and reviewing PAP therapy data with patient  5 minutes    [id number]  [id number]  no (3 day STM)       Addended by: NICK GREENFIELD on: 10/17/2022 08:25 AM     Modules accepted: Orders

## 2022-11-01 ENCOUNTER — TELEPHONE (OUTPATIENT)
Dept: INTERNAL MEDICINE | Facility: CLINIC | Age: 47
End: 2022-11-01

## 2022-11-15 DIAGNOSIS — Z79.899 HIGH RISK MEDICATION USE: ICD-10-CM

## 2022-11-15 DIAGNOSIS — Z00.00 HEALTHCARE MAINTENANCE: Primary | ICD-10-CM

## 2022-11-15 DIAGNOSIS — Z79.899 DRUG THERAPY: ICD-10-CM

## 2022-11-17 LAB
ALBUMIN SERPL-MCNC: 4.6 G/DL (ref 3.5–5.2)
ALBUMIN/GLOB SERPL: 2.2 G/DL
ALP SERPL-CCNC: 122 U/L (ref 39–117)
ALT SERPL-CCNC: 15 U/L (ref 1–33)
AMPHETAMINES UR QL SCN: POSITIVE NG/ML
AST SERPL-CCNC: 19 U/L (ref 1–32)
BARBITURATES UR QL SCN: NEGATIVE NG/ML
BENZODIAZ UR QL SCN: NEGATIVE NG/ML
BILIRUB SERPL-MCNC: 0.3 MG/DL (ref 0–1.2)
BUN SERPL-MCNC: 11 MG/DL (ref 6–20)
BUN/CREAT SERPL: 15.9 (ref 7–25)
BZE UR QL SCN: NEGATIVE NG/ML
CALCIUM SERPL-MCNC: 9.2 MG/DL (ref 8.6–10.5)
CANNABINOIDS UR QL SCN: NEGATIVE NG/ML
CHLORIDE SERPL-SCNC: 103 MMOL/L (ref 98–107)
CHOLEST SERPL-MCNC: 203 MG/DL (ref 0–200)
CHOLEST/HDLC SERPL: 3.17 {RATIO}
CO2 SERPL-SCNC: 27 MMOL/L (ref 22–29)
CREAT SERPL-MCNC: 0.69 MG/DL (ref 0.57–1)
CREAT UR-MCNC: 204.7 MG/DL (ref 20–300)
EGFRCR SERPLBLD CKD-EPI 2021: 106.5 ML/MIN/1.73
ERYTHROCYTE [DISTWIDTH] IN BLOOD BY AUTOMATED COUNT: 13.2 % (ref 12.3–15.4)
GLOBULIN SER CALC-MCNC: 2.1 GM/DL
GLUCOSE SERPL-MCNC: 94 MG/DL (ref 65–99)
HCT VFR BLD AUTO: 41.5 % (ref 34–46.6)
HDLC SERPL-MCNC: 64 MG/DL (ref 40–60)
HGB BLD-MCNC: 13.6 G/DL (ref 12–15.9)
LABORATORY COMMENT REPORT: ABNORMAL
LDLC SERPL CALC-MCNC: 119 MG/DL (ref 0–100)
MCH RBC QN AUTO: 28 PG (ref 26.6–33)
MCHC RBC AUTO-ENTMCNC: 32.8 G/DL (ref 31.5–35.7)
MCV RBC AUTO: 85.4 FL (ref 79–97)
METHADONE UR QL SCN: NEGATIVE NG/ML
OPIATES UR QL SCN: NEGATIVE NG/ML
OXYCODONE+OXYMORPHONE UR QL SCN: NEGATIVE NG/ML
PCP UR QL: NEGATIVE NG/ML
PH UR: 5.7 [PH] (ref 4.5–8.9)
PLATELET # BLD AUTO: 308 10*3/MM3 (ref 140–450)
POTASSIUM SERPL-SCNC: 4.2 MMOL/L (ref 3.5–5.2)
PROPOXYPH UR QL SCN: NEGATIVE NG/ML
PROT SERPL-MCNC: 6.7 G/DL (ref 6–8.5)
RBC # BLD AUTO: 4.86 10*6/MM3 (ref 3.77–5.28)
SODIUM SERPL-SCNC: 138 MMOL/L (ref 136–145)
TRIGL SERPL-MCNC: 112 MG/DL (ref 0–150)
TSH SERPL DL<=0.005 MIU/L-ACNC: 3.51 UIU/ML (ref 0.27–4.2)
VLDLC SERPL CALC-MCNC: 20 MG/DL (ref 5–40)
WBC # BLD AUTO: 3.84 10*3/MM3 (ref 3.4–10.8)

## 2022-11-18 ENCOUNTER — OFFICE VISIT (OUTPATIENT)
Dept: INTERNAL MEDICINE | Facility: CLINIC | Age: 47
End: 2022-11-18

## 2022-11-18 VITALS
BODY MASS INDEX: 32.27 KG/M2 | OXYGEN SATURATION: 98 % | TEMPERATURE: 97.5 F | HEART RATE: 86 BPM | WEIGHT: 200.8 LBS | DIASTOLIC BLOOD PRESSURE: 82 MMHG | HEIGHT: 66 IN | SYSTOLIC BLOOD PRESSURE: 128 MMHG

## 2022-11-18 DIAGNOSIS — Z23 IMMUNIZATION DUE: ICD-10-CM

## 2022-11-18 DIAGNOSIS — K21.9 GASTROESOPHAGEAL REFLUX DISEASE, UNSPECIFIED WHETHER ESOPHAGITIS PRESENT: ICD-10-CM

## 2022-11-18 DIAGNOSIS — F98.8 ATTENTION DEFICIT DISORDER (ADD) WITHOUT HYPERACTIVITY: Primary | ICD-10-CM

## 2022-11-18 DIAGNOSIS — E78.5 HYPERLIPIDEMIA LDL GOAL <100: ICD-10-CM

## 2022-11-18 DIAGNOSIS — F41.8 DEPRESSION WITH ANXIETY: ICD-10-CM

## 2022-11-18 PROCEDURE — 90471 IMMUNIZATION ADMIN: CPT | Performed by: NURSE PRACTITIONER

## 2022-11-18 PROCEDURE — 90686 IIV4 VACC NO PRSV 0.5 ML IM: CPT | Performed by: NURSE PRACTITIONER

## 2022-11-18 PROCEDURE — 99213 OFFICE O/P EST LOW 20 MIN: CPT | Performed by: NURSE PRACTITIONER

## 2022-11-18 RX ORDER — OMEPRAZOLE 40 MG/1
40 CAPSULE, DELAYED RELEASE ORAL DAILY
Qty: 90 CAPSULE | Refills: 3 | Status: SHIPPED | OUTPATIENT
Start: 2022-11-18

## 2022-11-18 NOTE — PROGRESS NOTES
Chief Complaint   Patient presents with   • ADD       Subjective     Elia Payne is a 47 y.o. female being seen for a follow up appointment today regarding ADHD, GERD, and XOCHILT. She was diagnosed with ADD with psychology, and previously saw Duran Rain for depression and anxiety. She reports that she has felt better than she has in a long time,. Better focus, improved motivation.      She is well maintained on Pristiq 50mg for this. She denies any depression symptoms. She reports that her job is very stressful, and is meeting with a  next week.      She is on omeprazole daily for GERD. She only has break thru symtpoms if she misses meds. She denies abd pain, reflux.        History of Present Illness     Allergies   Allergen Reactions   • Delsym Cgh-Chest Rodo Dm Child [Dextromethorphan-Guaifenesin] Nausea Only   • Synthroid [Levothyroxine Sodium] Other (See Comments)     TACHYCARDIA AND INSOMNIA   • Wellbutrin [Bupropion] Mental Status Change         Current Outpatient Medications:   •  amphetamine-dextroamphetamine XR (Adderall XR) 15 MG 24 hr capsule, Take 1 capsule by mouth Every Morning, Disp: 30 capsule, Rfl: 0  •  fluticasone (Flonase) 50 MCG/ACT nasal spray, 2 sprays into the nostril(s) as directed by provider Daily., Disp: 16 g, Rfl: 1  •  loratadine (Claritin) 10 MG tablet, Take 1 tablet by mouth Daily., Disp: 30 tablet, Rfl: 0  •  desvenlafaxine (Pristiq) 50 MG 24 hr tablet, Take 1 tablet by mouth Daily for 120 days., Disp: 90 tablet, Rfl: 1  •  omeprazole (priLOSEC) 40 MG capsule, Take 1 capsule by mouth Daily., Disp: 90 capsule, Rfl: 1    The following portions of the patient's history were reviewed and updated as appropriate: allergies, current medications, past family history, past medical history, past social history, past surgical history and problem list.    Review of Systems   Constitutional: Negative.    HENT: Negative.    Eyes: Negative.    Respiratory: Negative.     Cardiovascular: Negative.  Negative for chest pain and leg swelling.   Gastrointestinal: Negative.    Endocrine: Negative.    Genitourinary: Negative.    Musculoskeletal: Negative.    Skin: Negative.    Allergic/Immunologic: Negative.    Neurological: Negative.    Hematological: Negative.    Psychiatric/Behavioral: Negative.    All other systems reviewed and are negative.      Assessment     Physical Exam  Vitals reviewed.   Constitutional:       Appearance: Normal appearance.   Cardiovascular:      Rate and Rhythm: Normal rate and regular rhythm.      Pulses: Normal pulses.      Heart sounds: Normal heart sounds. No murmur heard.  Pulmonary:      Effort: Pulmonary effort is normal. No respiratory distress.      Breath sounds: Normal breath sounds. No stridor.   Skin:     General: Skin is warm and dry.   Neurological:      General: No focal deficit present.      Mental Status: She is alert and oriented to person, place, and time.   Psychiatric:         Mood and Affect: Mood normal.         Behavior: Behavior normal.         Thought Content: Thought content normal.         Plan     Her fasting labs were reviewed with the patient from last week.     Diagnoses and all orders for this visit:    1. Attention deficit disorder (ADD) without hyperactivity (Primary)  Comments:  Adderall XR 15 mg daily    2. Depression with anxiety  Comments:  On Pristiq 50mg daily    3. Gastroesophageal reflux disease, unspecified whether esophagitis present  Comments:  Resume Omeprazole 40mg daily  Orders:  -     omeprazole (priLOSEC) 40 MG capsule; Take 1 capsule by mouth Daily.  Dispense: 90 capsule; Refill: 3    4. Immunization due  -     FluLaval/Fluzone >6 mos (3800-3107)    5. Hyperlipidemia LDL goal <100  Comments:  Start [facility]       The patient has read and signed the [facility] Controlled Substance Contract.  I will continue to see patient for regular follow up appointments.  They are well controlled on their  medication.  RIKKI is updated every 3 months. The patient is aware of the potential for addiction and dependence.    Follow up in 3 months

## 2022-12-14 DIAGNOSIS — F90.0 ATTENTION DEFICIT HYPERACTIVITY DISORDER (ADHD), PREDOMINANTLY INATTENTIVE TYPE: ICD-10-CM

## 2022-12-14 NOTE — TELEPHONE ENCOUNTER
Caller: Elia Payne    Relationship: Self    Best call back number: [phone]    Requested Prescriptions:   Requested Prescriptions     Pending Prescriptions Disp Refills   • amphetamine-dextroamphetamine XR (Adderall XR) 15 MG 24 hr capsule 30 capsule 0     Sig: Take 1 capsule by mouth Every Morning        Pharmacy where request should be sent: [id number] [address] [facility] [address] - [phone]  - [phone] FX     Additional details provided by patient: PATIENT STATES SHE IS COMPLETELY OUT OF THIS PRESCRIPTION.     Does the patient have less than a 3 day supply:  [x] Yes  [] No    Would you like a call back once the refill request has been completed: [x] Yes [] No    If the office needs to give you a call back, can they leave a voicemail: [x] Yes [] No    Marian Zamora Rep   12/14/22 08:13 EST

## 2022-12-15 RX ORDER — DEXTROAMPHETAMINE SACCHARATE, AMPHETAMINE ASPARTATE MONOHYDRATE, DEXTROAMPHETAMINE SULFATE AND AMPHETAMINE SULFATE 3.75; 3.75; 3.75; 3.75 MG/1; MG/1; MG/1; MG/1
15 CAPSULE, EXTENDED RELEASE ORAL EVERY MORNING
Qty: 30 CAPSULE | Refills: 0 | Status: SHIPPED | OUTPATIENT
Start: 2022-12-15

## 2023-02-15 ENCOUNTER — TELEPHONE (OUTPATIENT)
Dept: INTERNAL MEDICINE | Facility: CLINIC | Age: 48
End: 2023-02-15
Payer: COMMERCIAL

## 2023-02-16 DIAGNOSIS — E78.5 HYPERLIPIDEMIA LDL GOAL <100: Primary | ICD-10-CM

## 2023-02-16 DIAGNOSIS — K21.00 GASTROESOPHAGEAL REFLUX DISEASE WITH ESOPHAGITIS WITHOUT HEMORRHAGE: ICD-10-CM

## 2023-02-16 DIAGNOSIS — E06.3 HASHIMOTO'S THYROIDITIS: ICD-10-CM

## 2023-02-21 ENCOUNTER — TELEPHONE (OUTPATIENT)
Dept: INTERNAL MEDICINE | Facility: CLINIC | Age: 48
End: 2023-02-21
Payer: COMMERCIAL

## 2023-02-21 NOTE — TELEPHONE ENCOUNTER
Okay for hub to read,please inform pt that  Thyroid levels are back in range, but her cholesterol is rising some. Please ask if she has been exercising regularly still. Will discuss at visit.

## 2023-04-10 ENCOUNTER — OFFICE VISIT (OUTPATIENT)
Dept: INTERNAL MEDICINE | Facility: CLINIC | Age: 48
End: 2023-04-10
Payer: COMMERCIAL

## 2023-04-10 VITALS
OXYGEN SATURATION: 97 % | TEMPERATURE: 97.1 F | HEIGHT: 66 IN | WEIGHT: 211.4 LBS | DIASTOLIC BLOOD PRESSURE: 78 MMHG | BODY MASS INDEX: 33.97 KG/M2 | SYSTOLIC BLOOD PRESSURE: 114 MMHG | HEART RATE: 84 BPM

## 2023-04-10 DIAGNOSIS — Z12.11 ENCOUNTER FOR SCREENING COLONOSCOPY: ICD-10-CM

## 2023-04-10 DIAGNOSIS — R73.01 IFG (IMPAIRED FASTING GLUCOSE): ICD-10-CM

## 2023-04-10 DIAGNOSIS — Z00.00 HEALTH CARE MAINTENANCE: Primary | ICD-10-CM

## 2023-04-10 DIAGNOSIS — R79.89 ELEVATED TSH: ICD-10-CM

## 2023-04-10 DIAGNOSIS — E66.01 MORBID (SEVERE) OBESITY DUE TO EXCESS CALORIES: ICD-10-CM

## 2023-04-10 DIAGNOSIS — E78.5 HYPERLIPIDEMIA LDL GOAL <100: ICD-10-CM

## 2023-04-10 DIAGNOSIS — Z80.0 FH: COLON CANCER: ICD-10-CM

## 2023-04-10 NOTE — PROGRESS NOTES
Annual Exam        [facility] is being seen for a Complete physical exam. Her last physical was 8-9-2017.     Social: She is . She is working full time at home town [employer]. She has a son, Gene who just graduated.     Lifestyle: She does not use tobacco. She drinks 1 alcoholic drinks per week. She exercises 0 times a week.    Screening: Colonoscopy was completed never, she is due. Last labs reviewed from 2-.      Reproductive Health: Her Last Pap smear was 9- with Dr. Subramanian. Last menstrual period was 3-5-2021 had endometrial ablation and IUD removal. Last Mammogram was 9-.     Dental exam is up to date. Eye exam was completed this year.      History of Present Illness     The following portions of the patient's history were reviewed and updated as appropriate: allergies, current medications, past family history, past medical history, past social history, past surgical history and problem list.    Review of Systems   Constitutional: Negative.    HENT: Negative.    Eyes: Negative.    Respiratory: Negative.    Cardiovascular: Negative.  Negative for chest pain, palpitations and leg swelling.   Gastrointestinal: Negative.    Endocrine: Negative.    Genitourinary: Negative.    Musculoskeletal: Negative.    Allergic/Immunologic: Negative.    Neurological: Negative.    Psychiatric/Behavioral: Negative.    All other systems reviewed and are negative.      Objective       General Appearance:    Alert, cooperative, no distress, appears stated age   Head:    Normocephalic, without obvious abnormality, atraumatic   Eyes:    PERRL, conjunctiva/corneas clear, EOM's intact, fundi     benign, both eyes   Ears:    Normal TM's and external ear canals, both ears   Nose:   Nares normal, septum midline, mucosa normal, no drainage     or sinus tenderness   Throat:   Lips, mucosa, and tongue normal; teeth and gums normal   Neck:   Supple, symmetrical, trachea midline, no adenopathy;     thyroid:  no  enlargement/tenderness/nodules; no carotid    bruit or JVD   Back:     Symmetric, no curvature, ROM normal, no CVA tenderness   Lungs:     Clear to auscultation bilaterally, respirations unlabored   Chest Wall:    No tenderness or deformity    Heart:    Regular rate and rhythm, S1 and S2 normal, no murmur, rub    or gallop   Breast Exam:    deferred   Abdomen:     Soft, non-tender, bowel sounds active all four quadrants,     no masses, no organomegaly   Genitalia:    deferred   Rectal:    deferred   Extremities:   Extremities normal, atraumatic, no cyanosis or edema   Pulses:   2+ and symmetric all extremities   Skin:   Skin color, texture, turgor normal, no rashes or lesions   Lymph nodes:   Cervical, supraclavicular, and axillary nodes normal   Neurologic:   CNII-XII intact, normal strength, sensation and reflexes     throughout               Assessment & Plan   Diagnoses and all orders for this visit:    1. Health care maintenance (Primary)    2. IFG (impaired fasting glucose)  -     Semaglutide-Weight Management 0.25 MG/0.5ML solution auto-injector; Inject 0.5 mL under the skin into the appropriate area as directed 1 (One) Time Per Week.  Dispense: 2 mL; Refill: 0  -     Ambulatory Referral to Endocrinology  -     Comprehensive Metabolic Panel; Future  -     CBC & Differential; Future  -     Lipid Panel With / Chol / HDL Ratio; Future  -     Hemoglobin A1c; Future    3. Hyperlipidemia LDL goal <100  -     Comprehensive Metabolic Panel; Future  -     Lipid Panel With / Chol / HDL Ratio; Future    4. Encounter for screening colonoscopy  -     Ambulatory Referral to Gastroenterology    5. FH: colon cancer  -     Ambulatory Referral to Gastroenterology    6. Morbid (severe) obesity due to excess calories  Comments:  BMI 34.1  Orders:  -     Semaglutide-Weight Management 0.25 MG/0.5ML solution auto-injector; Inject 0.5 mL under the skin into the appropriate area as directed 1 (One) Time Per Week.  Dispense: 2 mL;  Refill: 0    7. Elevated TSH  -     Ambulatory Referral to Endocrinology          Preventive counseling: She is due a Mammogram, and she will schedule this with GYN. She declines Hep B, TDAP and Covid. She will need a better exercise regimen.     The 10-year ASCVD risk score (Ninfa CUEVA, et al., 2019) is: 0.7%    Values used to calculate the score:      Age: 47 years      Sex: Female      Is Non- : No      Diabetic: No      Tobacco smoker: No      Systolic Blood Pressure: 114 mmHg      Is BP treated: No      HDL Cholesterol: 69 mg/dL      Total Cholesterol: 216 mg/dL    She will follow up at next scheduled appointment in 3 months

## 2023-05-03 DIAGNOSIS — F41.8 DEPRESSION WITH ANXIETY: ICD-10-CM

## 2023-05-03 RX ORDER — DESVENLAFAXINE SUCCINATE 50 MG/1
TABLET, EXTENDED RELEASE ORAL
Qty: 90 TABLET | Refills: 1 | Status: SHIPPED | OUTPATIENT
Start: 2023-05-03

## 2023-05-12 ENCOUNTER — OFFICE VISIT (OUTPATIENT)
Dept: ENDOCRINOLOGY | Age: 48
End: 2023-05-12
Payer: COMMERCIAL

## 2023-05-12 VITALS
WEIGHT: 214 LBS | TEMPERATURE: 97.1 F | SYSTOLIC BLOOD PRESSURE: 120 MMHG | DIASTOLIC BLOOD PRESSURE: 82 MMHG | OXYGEN SATURATION: 98 % | BODY MASS INDEX: 34.39 KG/M2 | HEIGHT: 66 IN | HEART RATE: 86 BPM

## 2023-05-12 DIAGNOSIS — E66.9 OBESITY (BMI 30-39.9): ICD-10-CM

## 2023-05-12 DIAGNOSIS — R73.09 ELEVATED GLUCOSE: ICD-10-CM

## 2023-05-12 DIAGNOSIS — R63.5 ABNORMAL WEIGHT GAIN: ICD-10-CM

## 2023-05-12 DIAGNOSIS — R79.89 ELEVATED TSH: Primary | ICD-10-CM

## 2023-05-12 PROBLEM — E66.01 MORBID (SEVERE) OBESITY DUE TO EXCESS CALORIES: Status: RESOLVED | Noted: 2023-04-10 | Resolved: 2023-05-12

## 2023-05-12 RX ORDER — LEVOTHYROXINE SODIUM 0.03 MG/1
TABLET ORAL
Qty: 30 TABLET | Refills: 3 | Status: SHIPPED | OUTPATIENT
Start: 2023-05-12

## 2023-05-12 NOTE — PROGRESS NOTES
Chief Complaint  Hypothyroidism      H[facility]:  [facility] for eval thyroid.       1) Hx Hashimotos  See by Dr Buckley in 2018  On LT4 25 in past  On [place] 15 in past  Hx agitation/hyperactive on LT4 25 mcg in 10/17 per Dr Buckley' notes  Given samples of Tirosint 75 mcg- says it made her nervous, felt like something was wrong    Currently taking:- no thyroid meds    Weight : gained- thinks 75# in past 5 years  Sleep:  Not great,  Heart: no palps  Bowel movements: fine  Energy : great in AM, tired by end of day  Started walking again but knees hurt bc of weight  Heat/cold intolerance: +hot flashes  Menses: LMP about 1 year ago?     Latest Reference Range & Units 12/13/18 11:40 03/15/21 10:43 11/15/22 11:03 02/20/23 13:53   TSH Baseline 0.450 - 4.500 uIU/mL 4.790 (H) 3.690 3.510 7.140 (H)   T4, Total 4.5 - 12.0 ug/dL 6.0      Free T4 0.82 - 1.77 ng/dL 1.05 0.84 (L)  0.93   T3, Free 2.0 - 4.4 pg/mL 3.0      T3 Uptake 24 - 39 % 25      Free Thyroxine Index 1.2 - 4.9  1.5      Thyroglobulin ng/mL  ng/mL 13.5  Comment      Thyroglobulin Ab 0.0 - 0.9 IU/mL  IU/mL <1.0  <1.0      Thyroid Peroxidase Antibody 0 - 34 IU/mL 9      (H): Data is abnormally high  (L): Data is abnormally low     Latest Reference Range & Units 02/15/18 10:16 06/13/18 09:14 12/13/18 11:40   Thyroglobulin Ab 0.0 - 0.9 IU/mL  IU/mL 1.5 (H) 1.0 (H) <1.0  <1.0      Latest Reference Range & Units 02/15/18 10:16 12/13/18 11:40   Thyroid Peroxidase Antibody 0 - 34 IU/mL 25 9       2) Impaired fasting glucose  Hx GDM  Wt increased as above  Had glucose 105 in 2/23  PCP rx'd Wegovy, not covered by insurance       Latest Reference Range & Units 06/13/18 09:14 12/13/18 11:40   Hemoglobin A1C 4.80 - 5.60 % 4.80 4.73 (L)   (L): Data is abnormally low     Latest Reference Range & Units 12/13/18 11:40 03/15/21 10:43 11/15/22 11:03 02/20/23 13:53   Glucose 70 - 99 mg/dL 79 92 94 105 (H)  Pt states was NOT  FASTING   (H):       Medical Records Reviewed:  Reviewed labs as above  Reviewed Dr Buckley' 2018 note  Reviewed most recent PCP note      Past Medical History:   Diagnosis Date   • Depression    • Exercise-induced asthma 01/20/2022   • Gestational diabetes mellitus (GDM), antepartum 06/13/2018   • Hashimoto's thyroiditis 02/21/2018   • PONV (postoperative nausea and vomiting)      Past Surgical History:   Procedure Laterality Date   • ABDOMINAL SURGERY     • ANKLE OPEN REDUCTION INTERNAL FIXATION Right 05/13/2017    Procedure:  OPEN REDUCTION INTERNAL FIXATION right ankle fractures;  Surgeon: Mega Beck MD;  Location: [facility];  Service:    • ANKLE SURGERY      broken in 1994   • BREAST SURGERY     • CHOLECYSTECTOMY     • COSMETIC SURGERY     • D & C HYSTEROSCOPY ENDOMETRIAL ABLATION  03/05/2021   • DILATATION AND CURETTAGE     • FRACTURE SURGERY  05/2017    right ankle     Family History   Problem Relation Age of Onset   • Cancer Father    • Diabetes Father      Social History     Socioeconomic History   • Marital status:    Tobacco Use   • Smoking status: Never   • Smokeless tobacco: Never   Vaping Use   • Vaping Use: Never used   Substance and Sexual Activity   • Alcohol use: Yes     Alcohol/week: 2.0 standard drinks     Types: 2 Cans of beer per week     Comment: socially   • Drug use: No   • Sexual activity: Yes     Partners: Male     Birth control/protection: Surgical       ROS: The following systems were specifically with patient and were unremarkable:constitutional, ophtho, ENT, CV, pulm, GI, , musculoskeletal, derm, neuro, psych, heme-onc, allergy, and endocrine (other than HPI).      MEDICATIONS  Not taking Adderall currently    Current Outpatient Medications   Medication Sig Dispense Refill   • desvenlafaxine (PRISTIQ) 50 MG 24 hr tablet TAKE ONE TABLET BY MOUTH DAILY 90 tablet 1   • omeprazole (priLOSEC) 40 MG capsule Take 1 capsule by mouth Daily. 90 capsule 3   •  "amphetamine-dextroamphetamine XR (Adderall XR) 15 MG 24 hr capsule Take 1 capsule by mouth Every Morning (Patient not taking: Reported on 5/12/2023) 30 capsule 0   • fluticasone (Flonase) 50 MCG/ACT nasal spray 2 sprays into the nostril(s) as directed by provider Daily. (Patient not taking: Reported on 5/12/2023) 16 g 1   • loratadine (Claritin) 10 MG tablet Take 1 tablet by mouth Daily. (Patient not taking: Reported on 5/12/2023) 30 tablet 0     No current facility-administered medications for this visit.       Physical Exam:  Vital Signs:  /82   Pulse 86   Temp 97.1 °F (36.2 °C)   Ht 167.6 cm (65.98\")   Wt 97.1 kg (214 lb)   SpO2 98%   BMI 34.56 kg/m²   Estimated body mass index is 34.14 kg/m² as calculated from the following:    Height as of this encounter: 167.6 cm (65.98\").    Weight as of 4/10/23: 95.9 kg (211 lb 6.4 oz).     Const: Obese pleasant  female in NAD  Not Cushingoid in appearance  Psych: alert, cooperative with exam, good insight, appropriate affect  Eyes: normal conjunctiva, no exophthalmos, anicteric  Neck: no masses. Thyroid nontender/nonenlarged  Lymph: no cervical or supraclavicular lymphadenopathy  Resp: CTA bilaterally, normal effort, good air movement throughout  CV: RRR, no murmur, rub, gallop. No edema or myxedema.  MS: normal digits and nails, no kyphosis, no wasting  Neuro: no tremors of outstretched hands DTRs 1+ and symmetrical bilat UE  Skin: not excessively warm, smooth, or dry; normal hair and eyebrows         Assessment and Plan   Diagnoses and all orders for this visit:    1. Elevated TSH (Primary)  Comment:hx +thyroid abs, with increased TSH and struggles with weight  D/w pt that subclinical hypothyroidism can interfere with wt loss but not likely etiology of wt gain  Plan:   Start LT4 25 mcg daily and if intolerant, try QOD and gradually increase dose  Check TFT now, prior to initiation and then in 3 mos before visit    -     TSH; Future  -     T4, Free; Future  -   "   T4, Free; Future  -     TSH; Future    2. Obesity (BMI 30-39.9)  Comment: would benefit from wt loss rx but GLP1 not covered   Plan:   Phentermine potentially an option, however she has hx anxiety, palps, etc  Would not do trial of this while trying to adapt to thyroid rx- d/w pt. Med info given  Would not rx if she is taking Adderall either. She is currently off it      3. Elevated glucose  Comment: glucose was not done fasting, so no clear dx of IFG  Plan:   Return fasting for labs    -     Hemoglobin A1c; Future  -     Glucose, Plasma ([facility]); Future    4. Abnormal weight gain  Comment: as above, TSH likely not responsible for weight. May be multifactorial, including menopause  Plan:    Rx thyroid  R/o Cushings  Give info on Dr Bob damon. Discussed no sugar/flour/snacking  Consider referral to wt management program    -     Salivary Cortisol X2, Timed; Future    Other orders  -     levothyroxine (SYNTHROID, LEVOTHROID) 25 MCG tablet; By mouth take 1 tab daily in AM as directed on empty stomach with water only.  Dispense: 30 tablet; Refill: 3              Plan of care reviewed with patient who verbalizes understanding and agrees.  Yasmin Nichole MD [facility]       Follow Up  Return in about 3 months (around 8/12/2023).  Patient was given instructions and counseling regarding her condition or for health maintenance advice. Please see specific information pulled into the AVS if appropriate.

## 2023-05-12 NOTE — PATIENT INSTRUCTIONS
How to Lose Weight for the Last Time- [employer] [facility] Eladia Rojas MD     salivary cortisol tests- do at midnight on 2 different nights- keep frozen until you bring them back  Return Fasting at 8 am for bloodwork. Nothing to eat or drink except water for 12 hours before your labs.  After labs, start thyroid medication      Start Synthroid 25 mcg daily  Thyroid medication (Levothyroxine, Synthroid, Levoxyl, Unithroid, ETC)  Take on empty stomach 1 hour before meal (or at bedtime) and 4 hours apart from any foods containing iron or calcium.    Possble future med: Phentermine - I'll attach info but I'm concerned it could make you jittery/anxious    MIDNIGHT SALIVARY CORTISOL TEST    Instructions for Collection:THIS IS A TIMED TEST- must be done as below.  - Do not brush teeth before collecting sample.  - Do not eat or drink for 15 minutes prior to sample collection.  - Collect sample between 11:00 pm and midnight. No earlier than 11:00 pm and no later than 12:00 am, midnight , otherwise you will have to repeat the test.  -Inhaled steroid medications may affect result of this test. Please notify physician if you are on any inhalers or nasal steroids (or any other steroid medication that might not be noted)      This is a time sensitive collection.  It must be done between 11:00 pm and 12:00 am, midnight.          Please schedule your follow up appointment for 3 months, get thyroid labs before visit. ?    TEST RESULTS:  Endocrinology is a specialty that relies heavily on interpretation of labs and other testing while also considering your personal medical history. This is best done in conjunction with an office visit, ideally with labs drawn prior to the visit so that they may be discussed in person. Any tests completed more than 7 days after your visit will require a further follow up visit for review and interpretation. This visit may be arranged as a telemedicine visit, in  "person, or and e-visit, depending on clinician availability when results become available.?Test Performed at this medical center I will inform you of your test results-normal or otherwise. Please wait for a letter or a phone call. If for some reason you do not receive a letter of phone call about your results within 3 weeks after your test date, it is very important for you to contact our office.?Test Performed Outside of this facility: If you decide to have your tests performed outside this facility, it is your responsibility to contact us to confirm that the results have been received and reviewed by me.?    When you review your labs, there may be things that are noted slightly out of range that are not remarked upon by your physician. Please do not be alarmed! Many of these things are seen in some portion of a normal healthy population. Other \"abnormalities\" are within lab error range. Still others are \"calculations\" rather than actual lab values (example: BUN/Cr ratio) and the individual components in the calculation are just fine.?Labs done outside this Medical Center will not be routinely resulted in LP33.TVt or visible for viewing there. Occasionally a few selected outside labs will be hand-entered into our computer system (for tracking purposes), in which case you will receive notification of a result. However, the entire battery of tests will still not be visible as outside labs are scanned into the EPIC system and do not appear in the lab results section. The same is true for any other tests or imaging studies. If you would like to view your results on SideStep, please be sure to have your tests done at this facility?Because your labs are now being automatically \"released\" by a computer system, it is possible you may receive notification at unusual times of day. Do not be concerned- you are not being contacted at odd hours because there is an emergency! Your test results will continue to be reviewed by your " "physician and results requiring follow up action will lead to a phone call from our office notifying you of such a concern. Conversely, please do not call the office after hours to address your test results. Please be aware that if you are notified of test results or messages via BEKIZ, it is your responsibility to log in to look at them. If no special intervention is needed after testing is done, you will not receive any additional contact from the office (i.e. Calls, letters, or messages). If I your results are normal, I am able to see that you have reviewed them and no further action is needed, you will not be called or contacted by the office.     APPOINTMENTS:  Once you schedule an appointment, the responsibility to keep it becomes yours. Please be sure to give 24 hours notice when calling the office to cancel an appointment, as less notice will also be treated as a \"no show.\" It is your responsibility to call to reschedule any missed or canceled appointments.? Excessive no-show visits will result in dismissal from the practice.    PRESCRIPTIONS:  Bring all of your medications to each visit and request the necessary refills at that time. Please allow 48 business hours for any refills requested outside of an office visit. Prescriptions will not be refilled after business hours or on weekends, so plan accordingly. Mail-order prescriptions can be electronically prescribed for you; if your mail-order pharmacy does not have this capability, you will need to mail in your written prescription.?    DIABETICS:  If you take medications to lower your glucoses, remember to always test your glucose prior to driving or operating any machinery.?Do not get pregnant without first normalizing your glucoses and discussing your plans with your physician.?Bring your glucose log book to all appointments at our office.    SCOPE OF MEDICAL CARE:  -Dr. Nichole is caring for you as your ENDOCRINOLOGIST. She does not function as a " primary care provider for her patients. All patients are expected to have an internal medicine or family medicine physician to provide non-endocrine care, which also includes urgent care, annual physicals, cancer screenings, and preoperative clearance.    **Information for MyChart Usage**?    Prescription refills:  For prescription refills, please do not send a message/request as a note to your doctor- it will actually delay the process! The best way to get a prescription refill is to ask your pharmacy to contact our office. They will then do so electronically. Please leave 48 business hours for all refills.?    Messages to the office/your physician:  We will attempt to answer messages within 2 business days. Please do not message your physician with new symptoms or concerns about possible medication side effects. It is always possible that your symptoms are more serious than you even perceive and we would like to be certain they are addressed promptly! What other type of questions may not best for MyChart? If you have a complicated question that requires more than a 2 sentence response, or multiple questions, you may be asked to make a follow up appointment. Review of test results done more than 7 days after your office visit will require an additional follow up visit to address and respond to questions. MyChart isn't a substitute for your personalized office visits.?

## 2023-07-17 PROBLEM — Z23 IMMUNIZATION DUE: Status: RESOLVED | Noted: 2022-11-18 | Resolved: 2023-07-17

## 2023-08-02 ENCOUNTER — TELEPHONE (OUTPATIENT)
Dept: ENDOCRINOLOGY | Age: 48
End: 2023-08-02
Payer: COMMERCIAL

## 2023-08-21 ENCOUNTER — TELEPHONE (OUTPATIENT)
Dept: INTERNAL MEDICINE | Facility: CLINIC | Age: 48
End: 2023-08-21

## 2023-08-21 NOTE — TELEPHONE ENCOUNTER
"  Caller: CalinsElia cowan \"AMANDA\"    Relationship: Self    Best call back number: [phone]     Requested Prescriptions:   Requested Prescriptions      No prescriptions requested or ordered in this encounter      [facility]     Pharmacy where request should be sent: [facility] [id number]  [place], [place] - [address] - [phone] PH - [phone] FX     Last office visit with prescribing clinician: 7/17/2023   Last telemedicine visit with prescribing clinician: Visit date not found   Next office visit with prescribing clinician: 4/22/2024     Additional details provided by patient: PATIENT IS OUT OF MEDICATION     Does the patient have less than a 3 day supply:  [x] Yes  [] No    Would you like a call back once the refill request has been completed: [] Yes [x] No    If the office needs to give you a call back, can they leave a voicemail: [] Yes [x] No    Marian Correia Rep   08/21/23 16:24 EDT       "

## 2023-08-22 NOTE — TELEPHONE ENCOUNTER
I do not see where this medication is active on her medication list, are you okay with refilling this?

## 2023-08-23 ENCOUNTER — OFFICE VISIT (OUTPATIENT)
Dept: INTERNAL MEDICINE | Facility: CLINIC | Age: 48
End: 2023-08-23
Payer: COMMERCIAL

## 2023-08-23 VITALS
BODY MASS INDEX: 34.39 KG/M2 | HEART RATE: 74 BPM | SYSTOLIC BLOOD PRESSURE: 114 MMHG | WEIGHT: 214 LBS | DIASTOLIC BLOOD PRESSURE: 82 MMHG | HEIGHT: 66 IN | OXYGEN SATURATION: 95 % | TEMPERATURE: 97.7 F

## 2023-08-23 DIAGNOSIS — F40.243 ANXIETY WITH FLYING: Primary | ICD-10-CM

## 2023-08-23 PROCEDURE — 99213 OFFICE O/P EST LOW 20 MIN: CPT | Performed by: NURSE PRACTITIONER

## 2023-08-23 RX ORDER — ALPRAZOLAM 0.5 MG/1
TABLET ORAL
Qty: 4 TABLET | Refills: 0 | Status: SHIPPED | OUTPATIENT
Start: 2023-08-23

## 2023-08-23 NOTE — PROGRESS NOTES
Chief Complaint   Patient presents with    Anxiety     Meds for flying       Subjective     Elia Payne is a 47 y.o. female being seen for an acute visit  visit to discuss flight anxiety. She is planning a trip to [place] [facility] [place]\"", and takes xanax as needed for flight anxiety. She is on daily Pristiq 50mg daily for XOCHILT.     Follow up as scheduledAnswers submitted by the patient for this visit:  Other (Submitted on 8/23/2023)  Please describe your symptoms.: Flying anxiety.  Have you had these symptoms before?: Yes  How long have you been having these symptoms?: 1-4 days  Please list any medications you are currently taking for this condition.: Xanax  Please describe any probable cause for these symptoms. : Anxiety when flying in plane. Feeling trapped.  Primary Reason for Visit (Submitted on 8/23/2023)  What is the primary reason for your visit?: Other      Anxiety  Symptoms include nervous/anxious behavior.          Allergies   Allergen Reactions    Delsym Cgh-Chest Rodo Dm Child [Dextromethorphan-Guaifenesin] Nausea Only    Synthroid [Levothyroxine Sodium] Other (See Comments)     TACHYCARDIA AND INSOMNIA    Wellbutrin [Bupropion] Mental Status Change         Current Outpatient Medications:     desvenlafaxine (PRISTIQ) 50 MG 24 hr tablet, TAKE ONE TABLET BY MOUTH DAILY, Disp: 90 tablet, Rfl: 1    levothyroxine (SYNTHROID, LEVOTHROID) 25 MCG tablet, By mouth take 1 tab daily in AM as directed on empty stomach with water only., Disp: 30 tablet, Rfl: 3    loratadine (Claritin) 10 MG tablet, Take 1 tablet by mouth Daily., Disp: 30 tablet, Rfl: 0    omeprazole (priLOSEC) 40 MG capsule, Take 1 capsule by mouth Daily., Disp: 90 capsule, Rfl: 3    The following portions of the patient's history were reviewed and updated as appropriate: allergies, current medications, past family history, past medical history, past social history, past surgical history, and problem list.    Review of Systems   Constitutional:  Negative.    HENT: Negative.     Hematological: Negative.    Psychiatric/Behavioral:  The patient is nervous/anxious.    All other systems reviewed and are negative.    Assessment     Physical Exam  Vitals reviewed.   Constitutional:       Appearance: Normal appearance. She is obese.   Cardiovascular:      Rate and Rhythm: Normal rate and regular rhythm.      Pulses: Normal pulses.      Heart sounds: Normal heart sounds. No murmur heard.  Skin:     General: Skin is warm and dry.   Neurological:      Mental Status: She is alert and oriented to person, place, and time.   Psychiatric:         Mood and Affect: Mood normal.         Behavior: Behavior normal.         Thought Content: Thought content normal.       Plan         Diagnoses and all orders for this visit:    1. Anxiety with flying (Primary)  -     ALPRAZolam (Xanax) 0.5 MG tablet; Take 1 tablet by mouth 1 hour prior to flight as needed for anxiety  Dispense: 4 tablet; Refill: 0        The patient has read and signed the [facility] Controlled Substance Contract.  I will continue to see patient for regular follow up appointments.  They are well controlled on their medication.  RIKKI is updated every 3 months. The patient is aware of the potential for addiction and dependence.

## 2023-09-12 ENCOUNTER — TELEPHONE (OUTPATIENT)
Dept: ENDOCRINOLOGY | Age: 48
End: 2023-09-12

## 2023-09-12 NOTE — TELEPHONE ENCOUNTER
"  Hub staff attempted to follow warm transfer process and was unsuccessful     Caller: Elia Payne \"AMANDA\"    Relationship to patient: Self    Best call back number: [phone]    Patient is needing: PT HAS AN APPT SCHEDULED 9/22 AND WOULD NEED LABS SCHEDULED BEFOREHAND. PLEASE CALL PT TO ADVISE        "

## 2023-09-22 ENCOUNTER — OFFICE VISIT (OUTPATIENT)
Dept: ENDOCRINOLOGY | Age: 48
End: 2023-09-22
Payer: COMMERCIAL

## 2023-09-22 VITALS
TEMPERATURE: 98.2 F | BODY MASS INDEX: 34.78 KG/M2 | OXYGEN SATURATION: 96 % | DIASTOLIC BLOOD PRESSURE: 80 MMHG | SYSTOLIC BLOOD PRESSURE: 124 MMHG | WEIGHT: 216.4 LBS | HEART RATE: 81 BPM | HEIGHT: 66 IN

## 2023-09-22 DIAGNOSIS — E06.3 HASHIMOTO'S THYROIDITIS: Primary | ICD-10-CM

## 2023-09-22 RX ORDER — LEVOTHYROXINE SODIUM 0.03 MG/1
TABLET ORAL
Qty: 96 TABLET | Refills: 1 | Status: SHIPPED | OUTPATIENT
Start: 2023-09-22

## 2023-09-22 NOTE — PROGRESS NOTES
"Chief Complaint  Chief Complaint   Patient presents with    Elevated TSH     Pt states that energy levels have been low, weight is higher than expected, no family hx of thyroid disease.        Subjective          History of Present Illness    Elia Payne 47 y.o. presents for a follow-up evaluation of Hypothyroidism      She complains of fatigue and heat intolerance    Denies hair loss, dry skin, diarrhea, constipation, shortness of breath,chest pain, palpitations, cold intolerance, trouble swallowing, or change in voice.      Weight gain of 2 lbs since last visit.      Periods are absent due to ablation in 2022      Current treatment is levothyroxine 25 mcg daily  Past treatment includes [place] and Tirosint      Last labs in 09/23 showed TSH 4.380 and FT4 1.11      I have reviewed the patient's allergies, medicines, past medical hx, family hx and social hx.    Objective   Vital Signs:   /80   Pulse 81   Temp 98.2 °F (36.8 °C) (Oral)   Ht 167.6 cm (65.98\")   Wt 98.2 kg (216 lb 6.4 oz)   SpO2 96%   BMI 34.94 kg/m²       Physical Exam   Physical Exam  Constitutional:       General: She is not in acute distress.     Appearance: Normal appearance. She is not ill-appearing.   HENT:      Head: Normocephalic and atraumatic.   Eyes:      General:         Right eye: No discharge.         Left eye: No discharge.   Neck:      Thyroid: No thyroid mass, thyromegaly or thyroid tenderness.      Trachea: Trachea normal.   Cardiovascular:      Rate and Rhythm: Normal rate and regular rhythm.      Heart sounds: Normal heart sounds. No murmur heard.    No friction rub. No gallop.   Pulmonary:      Effort: Pulmonary effort is normal. No respiratory distress.      Breath sounds: Normal breath sounds. No wheezing.   Musculoskeletal:      Cervical back: Neck supple.   Skin:     General: Skin is warm and dry.   Neurological:      Mental Status: She is alert.   Psychiatric:         Mood and Affect: Mood normal.         " Behavior: Behavior normal.                  Results Review:   TSH   Date Value Ref Range Status   09/14/2023 4.380 (H) 0.270 - 4.200 uIU/mL Final     T3, Free   Date Value Ref Range Status   12/13/2018 3.0 2.0 - 4.4 pg/mL Final         Assessment and Plan    Diagnoses and all orders for this visit:    1. Hashimoto's thyroiditis (Primary)  -     levothyroxine (SYNTHROID, LEVOTHROID) 25 MCG tablet; levothyroxine 25 mcg 1 tablet Monday through Saturday and 2 tablets on Sunday  Dispense: 96 tablet; Refill: 1  -     TSH; Future  -     T4, Free; Future      TSH is elevated with normal FT 4  Change levothyroxine 25 mcg 1 tablet Monday through Saturday and 2 tablets on Sunday  Recheck labs in 8 weeks          Labs in 8 weeks  RTC in 4 months with me      Follow Up     Patient was given instructions and counseling regarding her condition or for health maintenance advice. Please see specific information pulled into the AVS if appropriate.              Shelley Torres, TED  09/22/23

## 2023-11-30 DIAGNOSIS — F40.243 ANXIETY WITH FLYING: ICD-10-CM

## 2023-11-30 RX ORDER — ALPRAZOLAM 0.5 MG/1
TABLET ORAL
Qty: 4 TABLET | Refills: 0 | Status: SHIPPED | OUTPATIENT
Start: 2023-11-30

## 2023-11-30 NOTE — TELEPHONE ENCOUNTER
Rx Refill Note  Requested Prescriptions     Pending Prescriptions Disp Refills    ALPRAZolam (Xanax) 0.5 MG tablet 4 tablet 0     Sig: Take 1 tablet by mouth 1 hour prior to flight as needed for anxiety      Last office visit with prescribing clinician: 8/23/2023   Last telemedicine visit with prescribing clinician: Visit date not found   Next office visit with prescribing clinician: 4/22/2024                         Would you like a call back once the refill request has been completed: [] Yes [] No    If the office needs to give you a call back, can they leave a voicemail: [] Yes [] No    Alannah Braswell MA  11/30/23, 14:05 EST

## 2023-11-30 NOTE — TELEPHONE ENCOUNTER
"    Caller: Elia Payne \"AMANDA\"    Relationship: Self    Best call back number: [phone]    Requested Prescriptions:   Requested Prescriptions     Pending Prescriptions Disp Refills    ALPRAZolam (Xanax) 0.5 MG tablet 4 tablet 0     Sig: Take 1 tablet by mouth 1 hour prior to flight as needed for anxiety        Pharm[facility] where request should be sent[id number] [place] [facility] [address] - [phone]  - [phone] FX     Last office visit with prescribing clinician: 8/23/2023   Last telemedicine visit with prescribing clinician: Visit date not found   Next office visit with prescribing clinician: 4/22/2024     Additional details provided by patient: PATIENT IS FLYING ON 12/1/23 AND IS REQUESTING THIS MEDICATION PRIOR.       Does the patient have less than a 3 day supply:  [x] Yes  [] No    Would you like a call back once the refill request has been completed: [] Yes [x] No    If the office needs to give you a call back, can they leave a voicemail: [] Yes [x] No    Marian Anne Rep   11/30/23 13:44 EST     "

## 2023-12-02 DIAGNOSIS — F41.8 DEPRESSION WITH ANXIETY: ICD-10-CM

## 2023-12-04 RX ORDER — DESVENLAFAXINE SUCCINATE 50 MG/1
TABLET, EXTENDED RELEASE ORAL
Qty: 90 TABLET | Refills: 1 | Status: SHIPPED | OUTPATIENT
Start: 2023-12-04

## 2023-12-05 DIAGNOSIS — K21.9 GASTROESOPHAGEAL REFLUX DISEASE, UNSPECIFIED WHETHER ESOPHAGITIS PRESENT: ICD-10-CM

## 2023-12-06 RX ORDER — OMEPRAZOLE 40 MG/1
40 CAPSULE, DELAYED RELEASE ORAL DAILY
Qty: 90 CAPSULE | Refills: 3 | Status: SHIPPED | OUTPATIENT
Start: 2023-12-06

## 2023-12-28 ENCOUNTER — TELEPHONE (OUTPATIENT)
Dept: ENDOCRINOLOGY | Age: 48
End: 2023-12-28
Payer: COMMERCIAL

## 2023-12-28 NOTE — TELEPHONE ENCOUNTER
Left pt a message to return call.   Okay for hub and  to read results.     Normal TSH with very slightly low FT4 - continue with levothyroxine 25 mcg 1 tablet Monday through Saturday and 2 tablets on Sunday

## 2024-01-23 ENCOUNTER — OFFICE VISIT (OUTPATIENT)
Dept: ENDOCRINOLOGY | Age: 49
End: 2024-01-23
Payer: COMMERCIAL

## 2024-01-23 VITALS
BODY MASS INDEX: 35.29 KG/M2 | WEIGHT: 219.6 LBS | SYSTOLIC BLOOD PRESSURE: 124 MMHG | OXYGEN SATURATION: 95 % | HEART RATE: 74 BPM | TEMPERATURE: 96.9 F | HEIGHT: 66 IN | DIASTOLIC BLOOD PRESSURE: 76 MMHG

## 2024-01-23 DIAGNOSIS — E06.3 HASHIMOTO'S THYROIDITIS: Primary | ICD-10-CM

## 2024-01-23 PROCEDURE — 99213 OFFICE O/P EST LOW 20 MIN: CPT | Performed by: NURSE PRACTITIONER

## 2024-01-23 RX ORDER — LEVOTHYROXINE SODIUM 0.05 MG/1
50 TABLET ORAL
Qty: 78 TABLET | Refills: 1 | Status: SHIPPED | OUTPATIENT
Start: 2024-01-23

## 2024-01-23 NOTE — PATIENT INSTRUCTIONS
Change levothyroxine 50 mcg 1 whole tablet Monday through Saturday and none on Sunday (takes 6 days a week)

## 2024-01-23 NOTE — PROGRESS NOTES
"Chief Complaint  Chief Complaint   Patient presents with    Hashimoto's Thyroiditis     Pt states that energy levels have been low, weight is higher than expected, no family hx of thyroid disease.        Subjective          History of Present Illness    Elia Payne 48 y.o. presents for a follow-up evaluation of Hypothyroidism        She complains of fatigue      Denies diarrhea, constipation, shortness of breath,chest pain, palpitations, cold intolerance, trouble swallowing or change in voice.      Weight gain of 3 lbs since last visit.    Periods are absent due to ablation in 2022        Current treatment is levothyroxine 25 mcg 1 tablet Monday through Friday and 2 tablets on Saturday and Sunday  Past treatment includes [facility] and Tirosint    Last labs in 12/23 showed TSH 3.650 and FT4 0.92      I have reviewed the patient's allergies, medicines, past medical hx, family hx and social hx.    Objective   Vital Signs:   /76   Pulse 74   Temp 96.9 °F (36.1 °C) (Temporal)   Ht 167.6 cm (65.98\")   Wt 99.6 kg (219 lb 9.6 oz)   SpO2 95%   BMI 35.46 kg/m²       Physical Exam   Physical Exam  Constitutional:       General: She is not in acute distress.     Appearance: Normal appearance. She is not ill-appearing.   HENT:      Head: Normocephalic and atraumatic.   Eyes:      General:         Right eye: No discharge.         Left eye: No discharge.   Neck:      Thyroid: No thyroid mass, thyromegaly or thyroid tenderness.      Trachea: Trachea normal.   Cardiovascular:      Rate and Rhythm: Normal rate and regular rhythm.      Heart sounds: Normal heart sounds. No murmur heard.     No friction rub. No gallop.   Pulmonary:      Effort: Pulmonary effort is normal. No respiratory distress.      Breath sounds: Normal breath sounds. No wheezing.   Musculoskeletal:      Cervical back: Neck supple.   Skin:     General: Skin is warm and dry.   Neurological:      Mental Status: She is alert.   Psychiatric:         " Mood and Affect: Mood normal.         Behavior: Behavior normal.                    Results Review:   TSH   Date Value Ref Range Status   12/27/2023 3.650 0.270 - 4.200 uIU/mL Final     T3, Free   Date Value Ref Range Status   12/13/2018 3.0 2.0 - 4.4 pg/mL Final         Assessment and Plan    Diagnoses and all orders for this visit:    1. Hashimoto's thyroiditis (Primary)  -     levothyroxine (SYNTHROID, LEVOTHROID) 50 MCG tablet; Take 1 tablet by mouth Every Morning. Change levothyroxine 50 mcg 1 whole tablet Monday through Saturday and none on Sunday (takes 6 days a week)  Dispense: 78 tablet; Refill: 1  -     TSH; Future  -     T4, Free; Future      TSH was in the 3s with low FT4  Pt still with symptoms  Will try to get TSH 1s-2s to see if we can get symptom improvement  Change levothyroxine 50 mcg 1 whole tablet Monday through Saturday and none on Sunday (takes 6 days a week)  Recheck labs in 8 weeks      Labs in 8 weeks  RTC in 6 months with me      Follow Up     Patient was given instructions and counseling regarding her condition or for health maintenance advice. Please see specific information pulled into the AVS if appropriate.              Shelley Torres, TED  01/23/24

## 2024-03-20 ENCOUNTER — TELEPHONE (OUTPATIENT)
Dept: ENDOCRINOLOGY | Age: 49
End: 2024-03-20
Payer: COMMERCIAL

## 2024-03-20 DIAGNOSIS — E06.3 HASHIMOTO'S THYROIDITIS: ICD-10-CM

## 2024-03-20 RX ORDER — LEVOTHYROXINE SODIUM 0.05 MG/1
50 TABLET ORAL
Qty: 90 TABLET | Refills: 1 | Status: SHIPPED | OUTPATIENT
Start: 2024-03-20

## 2024-04-09 ENCOUNTER — TELEPHONE (OUTPATIENT)
Dept: INTERNAL MEDICINE | Facility: CLINIC | Age: 49
End: 2024-04-09
Payer: COMMERCIAL

## 2024-04-11 DIAGNOSIS — E06.3 HASHIMOTO'S THYROIDITIS: ICD-10-CM

## 2024-04-11 DIAGNOSIS — R73.01 IFG (IMPAIRED FASTING GLUCOSE): ICD-10-CM

## 2024-04-11 DIAGNOSIS — E78.5 HYPERLIPIDEMIA LDL GOAL <100: Primary | ICD-10-CM

## 2024-05-06 ENCOUNTER — LAB (OUTPATIENT)
Dept: ENDOCRINOLOGY | Age: 49
End: 2024-05-06
Payer: COMMERCIAL

## 2024-05-06 DIAGNOSIS — E06.3 HASHIMOTO'S THYROIDITIS: ICD-10-CM

## 2024-05-06 LAB
T4 FREE SERPL-MCNC: 1.23 NG/DL (ref 0.93–1.7)
TSH SERPL DL<=0.005 MIU/L-ACNC: 2.5 UIU/ML (ref 0.27–4.2)

## 2024-05-27 DIAGNOSIS — E06.3 HASHIMOTO'S THYROIDITIS: ICD-10-CM

## 2024-05-28 RX ORDER — LEVOTHYROXINE SODIUM 0.05 MG/1
50 TABLET ORAL
Qty: 90 TABLET | Refills: 1 | Status: SHIPPED | OUTPATIENT
Start: 2024-05-28

## 2024-05-28 NOTE — TELEPHONE ENCOUNTER
Rx Refill Note  Requested Prescriptions     Pending Prescriptions Disp Refills    levothyroxine (SYNTHROID, LEVOTHROID) 50 MCG tablet 90 tablet 1     Sig: Take 1 tablet by mouth Every Morning.      Last office visit with prescribing clinician: 1/23/2024   Last telemedicine visit with prescribing clinician: Visit date not found   Next office visit with prescribing clinician: 7/23/2024                         Would you like a call back once the refill request has been completed: [] Yes [] No    If the office needs to give you a call back, can they leave a voicemail: [] Yes [] No    Shanice Rossi  05/28/24, 07:29 EDT

## 2024-06-03 ENCOUNTER — OFFICE VISIT (OUTPATIENT)
Dept: INTERNAL MEDICINE | Facility: CLINIC | Age: 49
End: 2024-06-03
Payer: COMMERCIAL

## 2024-06-03 VITALS
DIASTOLIC BLOOD PRESSURE: 86 MMHG | HEIGHT: 66 IN | HEART RATE: 75 BPM | SYSTOLIC BLOOD PRESSURE: 130 MMHG | OXYGEN SATURATION: 96 % | TEMPERATURE: 97.5 F | WEIGHT: 216 LBS | BODY MASS INDEX: 34.72 KG/M2

## 2024-06-03 DIAGNOSIS — F40.243 ANXIETY WITH FLYING: ICD-10-CM

## 2024-06-03 DIAGNOSIS — E66.9 OBESITY (BMI 30-39.9): ICD-10-CM

## 2024-06-03 DIAGNOSIS — R73.01 IFG (IMPAIRED FASTING GLUCOSE): Primary | ICD-10-CM

## 2024-06-03 DIAGNOSIS — E06.3 HASHIMOTO'S THYROIDITIS: ICD-10-CM

## 2024-06-03 DIAGNOSIS — E78.5 HYPERLIPIDEMIA LDL GOAL <100: ICD-10-CM

## 2024-06-03 DIAGNOSIS — F41.8 DEPRESSION WITH ANXIETY: ICD-10-CM

## 2024-06-03 PROBLEM — R79.89 ELEVATED TSH: Status: RESOLVED | Noted: 2023-04-10 | Resolved: 2024-06-03

## 2024-06-03 PROCEDURE — 99214 OFFICE O/P EST MOD 30 MIN: CPT | Performed by: NURSE PRACTITIONER

## 2024-06-03 RX ORDER — ALPRAZOLAM 0.5 MG/1
TABLET ORAL
Qty: 4 TABLET | Refills: 0 | Status: SHIPPED | OUTPATIENT
Start: 2024-06-03

## 2024-06-03 NOTE — PROGRESS NOTES
Chief Complaint   Patient presents with    Anxiety     Flying anxiety    Weight Loss     Discuss medicine       Subjective     Elia Payne is a 48 y.o. female being seen for a follow up appointment today regarding Hyperlipidemia, Hypothyroidism, GERD, IFG, and depression with anxiety.     She has IFG, and string FH of DM 2. She is interested in weight loss medicine.     She is on Pristiq 50mg for depression and anxiety. This controls her symptoms, and she has not had any panic attacks. She thinks this is contributing to her weight gain, but does not wish to change this due to efficiecy.      She has hyperlipidemia. She is walking every weekend for 1 hour. She admits to poor diet. She declines statin therapy, and will need new lipid levels.     She is on Omeprazole 40mg daily for GERD. She denies break thru symptoms.      She was referred to [facility] for Hashimoto's hypothyroidism, and started on levothyroxine 25mcgs daily for hashimoto's and tapered up to 50mcgs daily. Office Visit with Shelley Torres APRN (01/23/2024) SHe tried and failed both tirosint and Amour thyroid.         History of Present Illness     Allergies   Allergen Reactions    Delsym Cgh-Chest Rodo Dm Child [Dextromethorphan-Guaifenesin] Nausea Only    Synthroid [Levothyroxine Sodium] Other (See Comments)     TACHYCARDIA AND INSOMNIA    Wellbutrin [Bupropion] Mental Status Change         Current Outpatient Medications:     ALPRAZolam (Xanax) 0.5 MG tablet, Take 1 tablet by mouth 1 hour prior to flight as needed for anxiety, Disp: 4 tablet, Rfl: 0    desvenlafaxine (PRISTIQ) 50 MG 24 hr tablet, TAKE ONE TABLET BY MOUTH DAILY, Disp: 90 tablet, Rfl: 1    levothyroxine (SYNTHROID, LEVOTHROID) 50 MCG tablet, Take 1 tablet by mouth Every Morning., Disp: 90 tablet, Rfl: 1    loratadine (Claritin) 10 MG tablet, Take 1 tablet by mouth Daily., Disp: 30 tablet, Rfl: 0    omeprazole (priLOSEC) 40 MG capsule, TAKE ONE CAPSULE BY MOUTH DAILY, Disp: 90  capsule, Rfl: 3    The following portions of the patient's history were reviewed and updated as appropriate: allergies, current medications, past family history, past medical history, past social history, past surgical history, and problem list.    Review of Systems   Constitutional: Negative.    HENT: Negative.     Respiratory: Negative.     Cardiovascular: Negative.  Negative for chest pain.   Gastrointestinal: Negative.    Genitourinary: Negative.    Musculoskeletal: Negative.    Allergic/Immunologic: Negative.    Hematological: Negative.    Psychiatric/Behavioral: Negative.     All other systems reviewed and are negative.      Assessment     Physical Exam  Vitals reviewed.   Constitutional:       Appearance: Normal appearance. She is obese. She is not ill-appearing.   HENT:      Head: Normocephalic.      Right Ear: Tympanic membrane normal.      Left Ear: Tympanic membrane normal.   Cardiovascular:      Rate and Rhythm: Normal rate and regular rhythm.      Pulses: Normal pulses.      Heart sounds: Normal heart sounds. No murmur heard.  Pulmonary:      Effort: Pulmonary effort is normal.      Breath sounds: Normal breath sounds. No stridor.   Musculoskeletal:      Cervical back: Neck supple.      Right lower leg: No edema.      Left lower leg: No edema.   Skin:     General: Skin is warm and dry.   Neurological:      General: No focal deficit present.      Mental Status: She is alert and oriented to person, place, and time.   Psychiatric:         Mood and Affect: Mood normal.         Behavior: Behavior normal.         Thought Content: Thought content normal.         Plan     Her fasting labs were reviewed with the patient from last week.     Diagnoses and all orders for this visit:    1. IFG (impaired fasting glucose) (Primary)  -     CBC & Differential  -     Comprehensive Metabolic Panel  -     Lipid Panel With / Chol / HDL Ratio  -     Hemoglobin A1c  -     Tirzepatide-Weight Management (ZEPBOUND) 2.5 MG/0.5ML  solution auto-injector; Inject 0.5 mL under the skin into the appropriate area as directed 1 (One) Time Per Week.  Dispense: 2 mL; Refill: 3    2. Hashimoto's thyroiditis  -     T4, Free; Future  -     TSH; Future    3. Hyperlipidemia LDL goal <100  -     Comprehensive Metabolic Panel  -     Lipid Panel With / Chol / HDL Ratio    4. Depression with anxiety    5. Obesity (BMI 30-39.9)  Comments:  BMI 34.9  Orders:  -     Tirzepatide-Weight Management (ZEPBOUND) 2.5 MG/0.5ML solution auto-injector; Inject 0.5 mL under the skin into the appropriate area as directed 1 (One) Time Per Week.  Dispense: 2 mL; Refill: 3    6. Anxiety with flying  -     ALPRAZolam (Xanax) 0.5 MG tablet; Take 1 tablet by mouth 1 hour prior to flight as needed for anxiety  Dispense: 4 tablet; Refill: 0    Discussed GLP medications risk and benefit. Compounded pharmacy risk reviewed. She will look up a coupon for Zepbound and try this.     Follow up in 4 weeks

## 2024-06-04 LAB
ALBUMIN SERPL-MCNC: 4.4 G/DL (ref 3.5–5.2)
ALBUMIN/GLOB SERPL: 1.9 G/DL
ALP SERPL-CCNC: 135 U/L (ref 39–117)
ALT SERPL-CCNC: 45 U/L (ref 1–33)
AST SERPL-CCNC: 26 U/L (ref 1–32)
BASOPHILS # BLD AUTO: 0.05 10*3/MM3 (ref 0–0.2)
BASOPHILS NFR BLD AUTO: 1 % (ref 0–1.5)
BILIRUB SERPL-MCNC: 0.2 MG/DL (ref 0–1.2)
BUN SERPL-MCNC: 12 MG/DL (ref 6–20)
BUN/CREAT SERPL: 14.1 (ref 7–25)
CALCIUM SERPL-MCNC: 9.1 MG/DL (ref 8.6–10.5)
CHLORIDE SERPL-SCNC: 105 MMOL/L (ref 98–107)
CHOLEST SERPL-MCNC: 190 MG/DL (ref 0–200)
CHOLEST/HDLC SERPL: 3.96 {RATIO}
CO2 SERPL-SCNC: 25.3 MMOL/L (ref 22–29)
CREAT SERPL-MCNC: 0.85 MG/DL (ref 0.57–1)
EGFRCR SERPLBLD CKD-EPI 2021: 83.6 ML/MIN/1.73
EOSINOPHIL # BLD AUTO: 0.12 10*3/MM3 (ref 0–0.4)
EOSINOPHIL NFR BLD AUTO: 2.5 % (ref 0.3–6.2)
ERYTHROCYTE [DISTWIDTH] IN BLOOD BY AUTOMATED COUNT: 13.1 % (ref 12.3–15.4)
GLOBULIN SER CALC-MCNC: 2.3 GM/DL
GLUCOSE SERPL-MCNC: 107 MG/DL (ref 65–99)
HBA1C MFR BLD: 5.7 % (ref 4.8–5.6)
HCT VFR BLD AUTO: 43.4 % (ref 34–46.6)
HDLC SERPL-MCNC: 48 MG/DL (ref 40–60)
HGB BLD-MCNC: 14.5 G/DL (ref 12–15.9)
IMM GRANULOCYTES # BLD AUTO: 0.01 10*3/MM3 (ref 0–0.05)
IMM GRANULOCYTES NFR BLD AUTO: 0.2 % (ref 0–0.5)
LDLC SERPL CALC-MCNC: 122 MG/DL (ref 0–100)
LYMPHOCYTES # BLD AUTO: 1.49 10*3/MM3 (ref 0.7–3.1)
LYMPHOCYTES NFR BLD AUTO: 30.9 % (ref 19.6–45.3)
MCH RBC QN AUTO: 28.3 PG (ref 26.6–33)
MCHC RBC AUTO-ENTMCNC: 33.4 G/DL (ref 31.5–35.7)
MCV RBC AUTO: 84.8 FL (ref 79–97)
MONOCYTES # BLD AUTO: 0.45 10*3/MM3 (ref 0.1–0.9)
MONOCYTES NFR BLD AUTO: 9.3 % (ref 5–12)
NEUTROPHILS # BLD AUTO: 2.7 10*3/MM3 (ref 1.7–7)
NEUTROPHILS NFR BLD AUTO: 56.1 % (ref 42.7–76)
NRBC BLD AUTO-RTO: 0 /100 WBC (ref 0–0.2)
PLATELET # BLD AUTO: 309 10*3/MM3 (ref 140–450)
POTASSIUM SERPL-SCNC: 4.1 MMOL/L (ref 3.5–5.2)
PROT SERPL-MCNC: 6.7 G/DL (ref 6–8.5)
RBC # BLD AUTO: 5.12 10*6/MM3 (ref 3.77–5.28)
SODIUM SERPL-SCNC: 140 MMOL/L (ref 136–145)
TRIGL SERPL-MCNC: 113 MG/DL (ref 0–150)
VLDLC SERPL CALC-MCNC: 20 MG/DL (ref 5–40)
WBC # BLD AUTO: 4.82 10*3/MM3 (ref 3.4–10.8)

## 2024-07-15 DIAGNOSIS — F41.8 DEPRESSION WITH ANXIETY: ICD-10-CM

## 2024-07-15 RX ORDER — DESVENLAFAXINE SUCCINATE 50 MG/1
50 TABLET, EXTENDED RELEASE ORAL DAILY
Qty: 90 TABLET | Refills: 1 | Status: SHIPPED | OUTPATIENT
Start: 2024-07-15

## 2024-07-23 ENCOUNTER — OFFICE VISIT (OUTPATIENT)
Dept: ENDOCRINOLOGY | Age: 49
End: 2024-07-23
Payer: COMMERCIAL

## 2024-07-23 VITALS
HEART RATE: 75 BPM | OXYGEN SATURATION: 98 % | BODY MASS INDEX: 34.97 KG/M2 | TEMPERATURE: 96.9 F | HEIGHT: 66 IN | WEIGHT: 217.6 LBS | DIASTOLIC BLOOD PRESSURE: 84 MMHG | SYSTOLIC BLOOD PRESSURE: 134 MMHG

## 2024-07-23 DIAGNOSIS — E06.3 HASHIMOTO'S THYROIDITIS: Primary | ICD-10-CM

## 2024-07-23 PROCEDURE — 99213 OFFICE O/P EST LOW 20 MIN: CPT | Performed by: NURSE PRACTITIONER

## 2024-07-23 NOTE — PROGRESS NOTES
"Chief Complaint  Chief Complaint   Patient presents with    Hashimoto's Thyroiditis     Pt states that energy levels have been low, weight is higher than expected, no family hx of thyroid disease. Pt states that she is wanting to talk about weight lose medications.        Subjective          History of Present Illness    Elia Payne 48 y.o. presents for a follow-up evaluation of Hypothyroidism        She complains of fatigue, slight constipation and heat intolerance    Denies hair loss, dry skin, diarrhea, shortness of breath,chest pain, palpitations and cold intolerance      Weight loss of 2 lbs since last visit.    Periods are absent due to ablation in 2022        Current treatment is levothyroxine 50 mcg daily  Past treatment includes [facility] and Tirosint    Last labs in 05/24 showed TSH 2.500 and FT4 1.23      I have reviewed the patient's allergies, medicines, past medical hx, family hx and social hx.    Objective   Vital Signs:   /84   Pulse 75   Temp 96.9 °F (36.1 °C) (Temporal)   Ht 167.6 cm (65.98\")   Wt 98.7 kg (217 lb 9.6 oz)   SpO2 98%   BMI 35.14 kg/m²       Physical Exam   Physical Exam  Constitutional:       General: She is not in acute distress.     Appearance: Normal appearance. She is not diaphoretic.   HENT:      Head: Normocephalic and atraumatic.   Eyes:      General:         Right eye: No discharge.         Left eye: No discharge.   Skin:     General: Skin is warm and dry.   Neurological:      Mental Status: She is alert.   Psychiatric:         Mood and Affect: Mood normal.         Behavior: Behavior normal.                    Results Review:   TSH   Date Value Ref Range Status   05/06/2024 2.500 0.270 - 4.200 uIU/mL Final     T3, Free   Date Value Ref Range Status   12/13/2018 3.0 2.0 - 4.4 pg/mL Final         Assessment and Plan    Diagnoses and all orders for this visit:    1. Hashimoto's thyroiditis (Primary)  -     TSH; Future  -     T4, Free; Future      Labs in May " were great  Continue with levothyroxine   Pt states that she is going to try compounded weight loss medications - advised that levo dosing can be somewhat weight dependent and if she starts having symptoms of over active (went over these) that she can reach out and we can check labs    No refills needed at this time      RTC in 6 months with me, labs prior      Follow Up     Patient was given instructions and counseling regarding her condition or for health maintenance advice. Please see specific information pulled into the AVS if appropriate.              Shelley Torres, TED  07/23/24

## 2024-08-05 DIAGNOSIS — F40.243 ANXIETY WITH FLYING: ICD-10-CM

## 2024-08-05 RX ORDER — ALPRAZOLAM 0.5 MG/1
TABLET ORAL
Qty: 4 TABLET | Refills: 0 | OUTPATIENT
Start: 2024-08-05

## 2024-10-02 ENCOUNTER — OFFICE VISIT (OUTPATIENT)
Dept: INTERNAL MEDICINE | Facility: CLINIC | Age: 49
End: 2024-10-02
Payer: COMMERCIAL

## 2024-10-02 VITALS
OXYGEN SATURATION: 98 % | TEMPERATURE: 97.8 F | HEIGHT: 66 IN | WEIGHT: 200.2 LBS | DIASTOLIC BLOOD PRESSURE: 78 MMHG | SYSTOLIC BLOOD PRESSURE: 104 MMHG | HEART RATE: 80 BPM | BODY MASS INDEX: 32.17 KG/M2

## 2024-10-02 DIAGNOSIS — E78.5 HYPERLIPIDEMIA LDL GOAL <100: Primary | ICD-10-CM

## 2024-10-02 DIAGNOSIS — E06.3 HASHIMOTO'S THYROIDITIS: ICD-10-CM

## 2024-10-02 DIAGNOSIS — Z23 NEED FOR VACCINATION: ICD-10-CM

## 2024-10-02 DIAGNOSIS — F41.8 DEPRESSION WITH ANXIETY: ICD-10-CM

## 2024-10-02 DIAGNOSIS — E66.9 OBESITY (BMI 30-39.9): ICD-10-CM

## 2024-10-02 DIAGNOSIS — K21.00 GASTROESOPHAGEAL REFLUX DISEASE WITH ESOPHAGITIS WITHOUT HEMORRHAGE: ICD-10-CM

## 2024-10-02 DIAGNOSIS — Z12.11 ENCOUNTER FOR SCREENING COLONOSCOPY: ICD-10-CM

## 2024-10-02 DIAGNOSIS — R73.01 IFG (IMPAIRED FASTING GLUCOSE): ICD-10-CM

## 2024-10-02 PROCEDURE — 90471 IMMUNIZATION ADMIN: CPT | Performed by: NURSE PRACTITIONER

## 2024-10-02 PROCEDURE — 90656 IIV3 VACC NO PRSV 0.5 ML IM: CPT | Performed by: NURSE PRACTITIONER

## 2024-10-02 PROCEDURE — 99214 OFFICE O/P EST MOD 30 MIN: CPT | Performed by: NURSE PRACTITIONER

## 2024-10-02 NOTE — PROGRESS NOTES
Chief Complaint   Patient presents with    Hyperlipidemia     Follow up       Subjective     Elia Payne is a 48 y.o. female being seen for a follow up appointment today regarding Hyperlipidemia, Hypothyroidism, GERD, IFG, and depression with anxiety.      She has IFG, and strong FH of DM 2. She was prescribed Zepbound, but she is getting this compounded due to insurance coverage. She is on Zepbound weekly thru [facility].      She is on Pristiq 50mg for depression and anxiety. This controls her symptoms, and she has not had any panic attacks. She thinks this is contributing to her weight gain, but does not wish to change this due to efficiecy.      She has hyperlipidemia. She is walking every weekend for 1 hour. She admits to poor diet. She declines statin therapy, and will need new lipid levels.     She is on Omeprazole 40mg daily for GERD. She denies break thru symptoms.      She was [facility] for [facility]imoto's hypothyroidism, and started on levothyroxine 25mcgs daily for hashimoto's and tapered up to 50mcgs daily. She tried and failed both tirosint and Amour thyroid. Office Visit with Shelley Torres APRN (07/23/2024)       Answers submitted by the patient for this visit:  Other (Submitted on 10/2/2024)  Please describe your symptoms.: Medicine check  Have you had these symptoms before?: No  How long have you been having these symptoms?: Greater than 2 weeks  Please list any medications you are currently taking for this condition.: Tirzepatide 0.4 ml  Please describe any probable cause for these symptoms. : Skip  Primary Reason for Visit (Submitted on 10/2/2024)  What is the primary reason for your visit?: Problem Not Listed    History of Present Illness     Allergies   Allergen Reactions    Delsym Cgh-Chest Rodo Dm Child [Dextromethorphan-Guaifenesin] Nausea Only    Synthroid [Levothyroxine Sodium] Other (See Comments)     TACHYCARDIA AND INSOMNIA    Wellbutrin [Bupropion] Mental Status Change          Current Outpatient Medications:     ALPRAZolam (Xanax) 0.5 MG tablet, Take 1 tablet by mouth 1 hour prior to flight as needed for anxiety, Disp: 4 tablet, Rfl: 0    desvenlafaxine (PRISTIQ) 50 MG 24 hr tablet, TAKE 1 TABLET BY MOUTH DAILY, Disp: 90 tablet, Rfl: 1    levothyroxine (SYNTHROID, LEVOTHROID) 50 MCG tablet, Take 1 tablet by mouth Every Morning., Disp: 90 tablet, Rfl: 1    loratadine (Claritin) 10 MG tablet, Take 1 tablet by mouth Daily., Disp: 30 tablet, Rfl: 0    omeprazole (priLOSEC) 40 MG capsule, TAKE ONE CAPSULE BY MOUTH DAILY, Disp: 90 capsule, Rfl: 3    Tirzepatide-Weight Management (ZEPBOUND) 2.5 MG/0.5ML solution auto-injector, Inject 0.5 mL under the skin into the appropriate area as directed 1 (One) Time Per Week. (Patient taking differently: Inject 0.5 mL under the skin into the appropriate area as directed 1 (One) Time Per Week. 0.4mg weekly), Disp: 2 mL, Rfl: 3    The following portions of the patient's history were reviewed and updated as appropriate: allergies, current medications, past family history, past medical history, past social history, past surgical history, and problem list.    Review of Systems   Constitutional: Negative.    HENT: Negative.     Eyes: Negative.    Cardiovascular: Negative.    Gastrointestinal: Negative.    Endocrine: Negative.    Musculoskeletal: Negative.    Allergic/Immunologic: Negative.    Neurological: Negative.    Psychiatric/Behavioral:  The patient is nervous/anxious.    All other systems reviewed and are negative.      Assessment     Physical Exam  Vitals reviewed.   Constitutional:       Appearance: Normal appearance. She is obese. She is not ill-appearing.   HENT:      Head: Normocephalic.      Right Ear: Tympanic membrane normal.      Left Ear: Tympanic membrane normal.   Cardiovascular:      Rate and Rhythm: Normal rate and regular rhythm.      Pulses: Normal pulses.      Heart sounds: Normal heart sounds. No murmur heard.  Pulmonary:       Effort: Pulmonary effort is normal. No respiratory distress.      Breath sounds: Normal breath sounds. No stridor.   Skin:     General: Skin is warm and dry.   Neurological:      General: No focal deficit present.      Mental Status: She is alert and oriented to person, place, and time.   Psychiatric:         Mood and Affect: Mood normal.         Thought Content: Thought content normal.         Plan     Her fasting labs were reviewed with the patient from last week.     Diagnoses and all orders for this visit:    1. Hyperlipidemia LDL goal <100 (Primary)  -     CBC & Differential; Future  -     Comprehensive Metabolic Panel; Future  -     Lipid Panel With / Chol / HDL Ratio; Future    2. IFG (impaired fasting glucose)  -     Comprehensive Metabolic Panel; Future  -     Hemoglobin A1c; Future    3. Obesity (BMI 30-39.9)    4. Hashimoto's thyroiditis  -     T4, Free; Future  -     TSH; Future    5. Gastroesophageal reflux disease with esophagitis without hemorrhage  -     CBC & Differential; Future  -     Comprehensive Metabolic Panel; Future    6. Depression with anxiety    7. Encounter for screening colonoscopy  -     Ambulatory Referral For Screening Colonoscopy    LDL improving, goal < 100, improving    Hgb A1c at goal 5.7%    Weight improving on Zepbound    Euthyroid on current dose of levothyroxine    The patient has read and signed the [facility] Controlled Substance Contract.  I will continue to see patient for regular follow up appointments.  They are well controlled on their medication.  RIKKI is updated every 3 months. The patient is aware of the potential for addiction and dependence.     Follow up in 6 months with labs

## 2024-10-30 ENCOUNTER — TELEPHONE (OUTPATIENT)
Dept: GASTROENTEROLOGY | Facility: CLINIC | Age: 49
End: 2024-10-30
Payer: COMMERCIAL

## 2024-11-11 ENCOUNTER — PREP FOR SURGERY (OUTPATIENT)
Dept: SURGERY | Facility: SURGERY CENTER | Age: 49
End: 2024-11-11
Payer: COMMERCIAL

## 2024-11-11 DIAGNOSIS — Z80.0 FAMILY HISTORY OF COLON CANCER: Primary | ICD-10-CM

## 2024-11-11 DIAGNOSIS — Z12.11 ENCOUNTER FOR SCREENING COLONOSCOPY: ICD-10-CM

## 2024-11-11 RX ORDER — SODIUM CHLORIDE 0.9 % (FLUSH) 0.9 %
10 SYRINGE (ML) INJECTION AS NEEDED
OUTPATIENT
Start: 2024-11-11

## 2024-11-11 RX ORDER — SODIUM CHLORIDE, SODIUM LACTATE, POTASSIUM CHLORIDE, CALCIUM CHLORIDE 600; 310; 30; 20 MG/100ML; MG/100ML; MG/100ML; MG/100ML
30 INJECTION, SOLUTION INTRAVENOUS CONTINUOUS PRN
OUTPATIENT
Start: 2024-11-11 | End: 2024-11-12

## 2024-11-11 RX ORDER — SODIUM CHLORIDE 0.9 % (FLUSH) 0.9 %
3 SYRINGE (ML) INJECTION EVERY 12 HOURS SCHEDULED
OUTPATIENT
Start: 2024-11-11

## 2024-12-06 DIAGNOSIS — E66.9 OBESITY (BMI 30-39.9): ICD-10-CM

## 2024-12-06 DIAGNOSIS — R73.01 IFG (IMPAIRED FASTING GLUCOSE): ICD-10-CM

## 2024-12-06 NOTE — TELEPHONE ENCOUNTER
Rx Refill Note  Requested Prescriptions     Pending Prescriptions Disp Refills    Tirzepatide-Weight Management (ZEPBOUND) 2.5 MG/0.5ML solution auto-injector 2 mL 3     Sig: Inject 0.5 mL under the skin into the appropriate area as directed 1 (One) Time Per Week. 0.4mg weekly      Last office visit with prescribing clinician: 10/2/2024   Last telemedicine visit with prescribing clinician: Visit date not found   Next office visit with prescribing clinician: 4/9/2025                         Would you like a call back once the refill request has been completed: [] Yes [] No    If the office needs to give you a call back, can they leave a voicemail: [] Yes [] No    Alannah Braswell MA  12/06/24, 16:05 EST

## 2024-12-06 NOTE — TELEPHONE ENCOUNTER
"  Caller: Elia Payne \"AMANDA\"    Relationship: Self    Best call back number: [phone]     Requested Prescriptions:   Requested Prescriptions     Pending Prescriptions Disp Refills    Tirzepatide-Weight Management (ZEPBOUND) 2.5 MG/0.5ML solution auto-injector       Sig: Inject 0.5 mL under the skin into the appropriate area as directed 1 (One) Time Per Week. 0.4mg weekly        Pharmacy where request should be sent: [id number] [place] [facility] [address] - [phone]  - [phone] FX     Last office visit with prescribing clinician: 10/2/2024   Last telemedicine visit with prescribing clinician: Visit date not found   Next office visit with prescribing clinician: 4/9/2025         Does the patient have less than a 3 day supply:  [x] Yes  [] No    Would you like a call back once the refill request has been completed: [] Yes [] No    If the office needs to give you a call back, can they leave a voicemail: [] Yes [] No    Marian Ann Rep   12/06/24 10:02 EST     "

## 2025-01-07 ENCOUNTER — ANESTHESIA EVENT (OUTPATIENT)
Dept: PERIOP | Facility: HOSPITAL | Age: 50
End: 2025-01-07
Payer: COMMERCIAL

## 2025-01-09 ENCOUNTER — ANESTHESIA (OUTPATIENT)
Dept: PERIOP | Facility: HOSPITAL | Age: 50
End: 2025-01-09
Payer: COMMERCIAL

## 2025-01-09 ENCOUNTER — HOSPITAL ENCOUNTER (OUTPATIENT)
Facility: HOSPITAL | Age: 50
Setting detail: HOSPITAL OUTPATIENT SURGERY
Discharge: HOME | End: 2025-01-09
Attending: STUDENT IN AN ORGANIZED HEALTH CARE EDUCATION/TRAINING PROGRAM | Admitting: STUDENT IN AN ORGANIZED HEALTH CARE EDUCATION/TRAINING PROGRAM
Payer: COMMERCIAL

## 2025-01-09 VITALS
SYSTOLIC BLOOD PRESSURE: 100 MMHG | BODY MASS INDEX: 30.35 KG/M2 | OXYGEN SATURATION: 96 % | HEIGHT: 65 IN | TEMPERATURE: 97.4 F | RESPIRATION RATE: 16 BRPM | WEIGHT: 182.2 LBS | HEART RATE: 68 BPM | DIASTOLIC BLOOD PRESSURE: 58 MMHG

## 2025-01-09 DIAGNOSIS — Z12.11 ENCOUNTER FOR SCREENING COLONOSCOPY: ICD-10-CM

## 2025-01-09 DIAGNOSIS — Z80.0 FAMILY HISTORY OF COLON CANCER: ICD-10-CM

## 2025-01-09 PROCEDURE — 45378 DIAGNOSTIC COLONOSCOPY: CPT | Performed by: STUDENT IN AN ORGANIZED HEALTH CARE EDUCATION/TRAINING PROGRAM

## 2025-01-09 PROCEDURE — 25010000002 PROPOFOL 200 MG/20ML EMULSION: Performed by: NURSE ANESTHETIST, CERTIFIED REGISTERED

## 2025-01-09 PROCEDURE — 25010000002 LIDOCAINE 2% SOLUTION: Performed by: NURSE ANESTHETIST, CERTIFIED REGISTERED

## 2025-01-09 RX ORDER — SODIUM CHLORIDE 0.9 % (FLUSH) 0.9 %
10 SYRINGE (ML) INJECTION AS NEEDED
Status: DISCONTINUED | OUTPATIENT
Start: 2025-01-09 | End: 2025-01-09 | Stop reason: HOSPADM

## 2025-01-09 RX ORDER — SODIUM CHLORIDE 0.9 % (FLUSH) 0.9 %
3 SYRINGE (ML) INJECTION EVERY 12 HOURS SCHEDULED
Status: DISCONTINUED | OUTPATIENT
Start: 2025-01-09 | End: 2025-01-09 | Stop reason: HOSPADM

## 2025-01-09 RX ORDER — DIPHENHYDRAMINE HYDROCHLORIDE 50 MG/ML
12.5 INJECTION INTRAMUSCULAR; INTRAVENOUS
Status: DISCONTINUED | OUTPATIENT
Start: 2025-01-09 | End: 2025-01-09 | Stop reason: HOSPADM

## 2025-01-09 RX ORDER — LIDOCAINE HYDROCHLORIDE 20 MG/ML
INJECTION, SOLUTION INFILTRATION; PERINEURAL AS NEEDED
Status: DISCONTINUED | OUTPATIENT
Start: 2025-01-09 | End: 2025-01-09 | Stop reason: SURG

## 2025-01-09 RX ORDER — SODIUM CHLORIDE 9 MG/ML
40 INJECTION, SOLUTION INTRAVENOUS AS NEEDED
Status: DISCONTINUED | OUTPATIENT
Start: 2025-01-09 | End: 2025-01-09 | Stop reason: HOSPADM

## 2025-01-09 RX ORDER — LIDOCAINE HYDROCHLORIDE 10 MG/ML
0.5 INJECTION, SOLUTION EPIDURAL; INFILTRATION; INTRACAUDAL; PERINEURAL ONCE AS NEEDED
Status: DISCONTINUED | OUTPATIENT
Start: 2025-01-09 | End: 2025-01-09 | Stop reason: HOSPADM

## 2025-01-09 RX ORDER — SODIUM CHLORIDE 0.9 % (FLUSH) 0.9 %
10 SYRINGE (ML) INJECTION EVERY 12 HOURS SCHEDULED
Status: DISCONTINUED | OUTPATIENT
Start: 2025-01-09 | End: 2025-01-09 | Stop reason: HOSPADM

## 2025-01-09 RX ORDER — ONDANSETRON 2 MG/ML
4 INJECTION INTRAMUSCULAR; INTRAVENOUS ONCE AS NEEDED
Status: DISCONTINUED | OUTPATIENT
Start: 2025-01-09 | End: 2025-01-09 | Stop reason: HOSPADM

## 2025-01-09 RX ORDER — PROPOFOL 10 MG/ML
INJECTION, EMULSION INTRAVENOUS AS NEEDED
Status: DISCONTINUED | OUTPATIENT
Start: 2025-01-09 | End: 2025-01-09 | Stop reason: SURG

## 2025-01-09 RX ORDER — SODIUM CHLORIDE, SODIUM LACTATE, POTASSIUM CHLORIDE, CALCIUM CHLORIDE 600; 310; 30; 20 MG/100ML; MG/100ML; MG/100ML; MG/100ML
30 INJECTION, SOLUTION INTRAVENOUS CONTINUOUS PRN
Status: DISCONTINUED | OUTPATIENT
Start: 2025-01-09 | End: 2025-01-09 | Stop reason: HOSPADM

## 2025-01-09 RX ADMIN — PROPOFOL 100 MG: 10 INJECTION, EMULSION INTRAVENOUS at 08:57

## 2025-01-09 RX ADMIN — LIDOCAINE HYDROCHLORIDE 60 MG: 20 INJECTION, SOLUTION INFILTRATION; PERINEURAL at 08:57

## 2025-01-09 RX ADMIN — PROPOFOL 100 MG: 10 INJECTION, EMULSION INTRAVENOUS at 09:00

## 2025-01-09 RX ADMIN — PROPOFOL 50 MG: 10 INJECTION, EMULSION INTRAVENOUS at 09:10

## 2025-01-09 RX ADMIN — PROPOFOL 50 MG: 10 INJECTION, EMULSION INTRAVENOUS at 09:05

## 2025-01-09 NOTE — ANESTHESIA POSTPROCEDURE EVALUATION
[facility]    Procedure Summary       Date: 01/09/25 Room / Location: [facility] ENDOSCOPY 2 /  [facility] OR    Anesthesia Start: 0851 Anesthesia Stop: 0915    Procedure: COLONOSCOPY FOR SCREENING Diagnosis:       Encounter for screening colonoscopy      Family history of colon cancer      (Encounter for screening colonoscopy [Z12.11])      (Family history of colon cancer [Z80.0])    Surgeons: Alex Hudson MD Provider: Sajan Conroy CRNA    Anesthesia Type: MAC ASA Status: 2            Anesthesia Type: MAC    Vitals  Vitals Value Taken Time   /58 01/09/25 0940   Temp     Pulse 56 01/09/25 0942   Resp 16 01/09/25 0917   SpO2 98 % 01/09/25 0942   Vitals shown include unfiled device data.        Post Anesthesia Care and Evaluation    Patient location during evaluation: bedside  Patient participation: complete - patient participated  Level of consciousness: awake and alert  Pain score: 0  Pain management: adequate    Airway patency: patent  Anesthetic complications: No anesthetic complications  PONV Status: none  Cardiovascular status: acceptable  Respiratory status: acceptable  Hydration status: acceptable  No anesthesia care post op

## 2025-01-09 NOTE — H&P
Patient Care Team:  Saadia Hunt APRN as PCP - General (Family Medicine)    CHIEF COMPLAINT: Family hx of CRC or polyps    HISTORY OF PRESENT ILLNESS:  For family history of colon cancer.     Past Medical History:   Diagnosis Date    Depression     Exercise-induced asthma 01/20/2022    Gestational diabetes mellitus (GDM), antepartum 06/13/2018    Hashimoto's thyroiditis 02/21/2018    Hyperlipidemia LDL goal <100 11/18/2022    PONV (postoperative nausea and vomiting)      Past Surgical History:   Procedure Laterality Date    ABDOMINAL SURGERY      ANKLE OPEN REDUCTION INTERNAL FIXATION Right 05/13/2017    Procedure:  OPEN REDUCTION INTERNAL FIXATION right ankle fractures;  Surgeon: Mega Beck MD;  Location: [facility];  Service:     ANKLE SURGERY      broken in 1994    BREAST SURGERY      CHOLECYSTECTOMY      COSMETIC SURGERY      D & C HYSTEROSCOPY ENDOMETRIAL ABLATION  03/05/2021    DILATATION AND CURETTAGE      FRACTURE SURGERY  05/2017    right ankle     Family History   Problem Relation Age of Onset    Cancer Father     Diabetes Father      Social History     Tobacco Use    Smoking status: Never     Passive exposure: Never    Smokeless tobacco: Never   Vaping Use    Vaping status: Never Used   Substance Use Topics    Alcohol use: Not Currently     Alcohol/week: 2.0 standard drinks of alcohol     Types: 2 Cans of beer per week     Comment: socially    Drug use: No     Medications Prior to Admission   Medication Sig Dispense Refill Last Dose/Taking    desvenlafaxine (PRISTIQ) 50 MG 24 hr tablet TAKE 1 TABLET BY MOUTH DAILY 90 tablet 1 1/8/2025 Morning    levothyroxine (SYNTHROID, LEVOTHROID) 50 MCG tablet Take 1 tablet by mouth Every Morning. 90 tablet 1 1/8/2025 Morning    loratadine (Claritin) 10 MG tablet Take 1 tablet by mouth Daily. 30 tablet 0 1/8/2025 Morning    omeprazole (priLOSEC) 40 MG capsule TAKE ONE CAPSULE BY MOUTH DAILY 90 capsule 3 1/8/2025 Morning    ALPRAZolam (Xanax) 0.5 MG tablet Take 1  "tablet by mouth 1 hour prior to flight as needed for anxiety 4 tablet 0 More than a month    Tirzepatide-Weight Management (ZEPBOUND) 2.5 MG/0.5ML solution auto-injector Inject 0.5 mL under the skin into the appropriate area as directed 1 (One) Time Per Week. 2 mL 3 12/26/2024     Allergies:  Delsym cgh-chest stacy dm child [dextromethorphan-guaifenesin], Synthroid [levothyroxine sodium], and Wellbutrin [bupropion]    REVIEW OF SYSTEMS:  Please see the above history of present illness for pertinent positives and negatives.  The remainder of the patient's systems have been reviewed and are negative.     Vital Signs  Temp:  [97.4 °F (36.3 °C)] 97.4 °F (36.3 °C)  Heart Rate:  [62] 62  Resp:  [15] 15  BP: ()/(70-71) 114/70    Flowsheet Rows      Flowsheet Row First Filed Value   Admission Height 165.1 cm (65\") Documented at 01/08/2025 0958   Admission Weight 82.1 kg (181 lb) Documented at 01/08/2025 0958             Physical Exam:  Physical Exam   Constitutional: Patient appears well-developed and well-nourished and in no acute distress   HEENT:   Head: Normocephalic and atraumatic.   Eyes:  Pupils are equal, round, and reactive to light. EOM are intact. Sclerae are anicteric and non-injected.  Mouth and Throat: Patient has moist mucous membranes. Oropharynx is clear of any erythema or exudate.     Neck: Neck supple. No JVD present. No thyromegaly present. No lymphadenopathy present.  Cardiovascular: Regular rate, regular rhythm, S1 normal and S2 normal.  Exam reveals no gallop and no friction rub.  No murmur heard.  Pulmonary/Chest: Lungs are clear to auscultation bilaterally. No respiratory distress. No wheezes. No rhonchi. No rales.   Abdominal: Soft. Bowel sounds are normal. No distension and no mass. There is no hepatosplenomegaly. There is no tenderness.   Musculoskeletal: Normal Muscle tone  Extremities: No edema. Pulses are palpable in all 4 extremities.  Neurological: Patient is alert and oriented to " person, place, and time. Cranial nerves II-XII are grossly intact with no focal deficits.  Skin: Skin is warm. No rash noted. Nails show no clubbing.  No cyanosis or erythema.    Debilities/Disabilities Identified: None  Emotional Behavior: Appropriate     Results Review:   I reviewed the patient's new clinical results.    Lab Results (most recent)       None            Imaging Results (Most Recent)       None          reviewed    ECG/EMG Results (most recent)       None          reviewed    Assessment & Plan   Family hx of CRC or polyps/  colonoscopy      I discussed the patient's findings and my recommendations with patient.     Alex Hudson MD  01/09/25  08:57 EST    Time: 10 min prior to procedure.

## 2025-01-09 NOTE — ANESTHESIA PREPROCEDURE EVALUATION
Anesthesia Evaluation     Patient summary reviewed and Nursing notes reviewed   history of anesthetic complications:  PONV  NPO Solid Status: > 8 hours  NPO Liquid Status: > 8 hours           Airway   Mallampati: II  TM distance: >3 FB  Neck ROM: full  No difficulty expected  Dental - normal exam     Pulmonary    (+) asthma (mild),  Cardiovascular   Exercise tolerance: excellent (>7 METS)        Neuro/Psych  (+) psychiatric history Depression and Anxiety  GI/Hepatic/Renal/Endo    (+) GERD well controlled, thyroid problem hypothyroidism    Musculoskeletal (-) negative ROS    Abdominal    Substance History - negative use     OB/GYN negative ob/gyn ROS         Other - negative ROS                   Anesthesia Plan    ASA 2     MAC     intravenous induction     Anesthetic plan, risks, benefits, and alternatives have been provided, discussed and informed consent has been obtained with: patient.  Pre-procedure education provided  Use of blood products discussed with patient  Consented to blood products.    Plan discussed with CRNA.    CODE STATUS:

## 2025-01-16 DIAGNOSIS — E06.3 HASHIMOTO'S THYROIDITIS: ICD-10-CM

## 2025-01-16 LAB
T4 FREE SERPL-MCNC: 1.08 NG/DL (ref 0.92–1.68)
TSH SERPL DL<=0.005 MIU/L-ACNC: 4.97 UIU/ML (ref 0.27–4.2)

## 2025-01-23 ENCOUNTER — OFFICE VISIT (OUTPATIENT)
Dept: ENDOCRINOLOGY | Age: 50
End: 2025-01-23
Payer: COMMERCIAL

## 2025-01-23 VITALS
WEIGHT: 184.2 LBS | DIASTOLIC BLOOD PRESSURE: 72 MMHG | OXYGEN SATURATION: 95 % | BODY MASS INDEX: 30.69 KG/M2 | HEIGHT: 65 IN | SYSTOLIC BLOOD PRESSURE: 122 MMHG | HEART RATE: 68 BPM | TEMPERATURE: 98.4 F

## 2025-01-23 DIAGNOSIS — E06.3 HYPOTHYROIDISM DUE TO HASHIMOTO THYROIDITIS: Primary | ICD-10-CM

## 2025-01-23 NOTE — PROGRESS NOTES
"Chief Complaint  Chief Complaint   Patient presents with    Hashimoto's Thyroiditis     Pt states that energy levels have been low, weight is higher than expected, no family hx of thyroid disease.       Subjective          History of Present Illness    Elia Payne 49 y.o. presents for a follow-up evaluation of Hypothyroidism     Thyroglobulin antibody positive in 2018       She complains of fatigue, hair loss and cold intolerance    Denies dry skin, diarrhea, constipation, shortness of breath,chest pain, palpitations and heat intolerance.      Weight loss of 33 lbs since last visit - been on Zepbound      Periods are absent due to ablation in 2022        Current treatment is levothyroxine 50 mcg daily  Past treatment includes [place] and Tirosint    Last labs in 01/25 showed TSH 4.970 and FT4 1.08        I have reviewed the patient's allergies, medicines, past medical hx, family hx and social hx.    Objective   Vital Signs:   /72   Pulse 68   Temp 98.4 °F (36.9 °C) (Oral)   Ht 165.1 cm (65\")   Wt 83.6 kg (184 lb 3.2 oz)   SpO2 95%   BMI 30.65 kg/m²       Physical Exam   Physical Exam  Constitutional:       General: She is not in acute distress.     Appearance: Normal appearance. She is not diaphoretic.   HENT:      Head: Normocephalic and atraumatic.   Eyes:      General:         Right eye: No discharge.         Left eye: No discharge.   Skin:     General: Skin is warm and dry.   Neurological:      Mental Status: She is alert.   Psychiatric:         Mood and Affect: Mood normal.         Behavior: Behavior normal.                    Results Review:   TSH   Date Value Ref Range Status   01/16/2025 4.970 (H) 0.270 - 4.200 uIU/mL Final     T3, Free   Date Value Ref Range Status   12/13/2018 3.0 2.0 - 4.4 pg/mL Final         Assessment and Plan    Diagnoses and all orders for this visit:    1. Hypothyroidism due to Hashimoto thyroiditis (Primary)  -     TSH Rfx On Abnormal To Free T4; Future      TSH " is slightly elevated with normal FT4  Pt denies any recent illness or missing doses, but states that she did recently have a colonoscopy.  Overall pt feel pretty good and symptoms are stable therefore we are going to continue with current levo dose and recheck labs in 8 weeks  If labs remain off then we will adjust dose, but hopefully they will be back within normal range.        No refills needed at this time      Labs in 8 weeks  RTC in 6 months with me      Follow Up     Patient was given instructions and counseling regarding her condition or for health maintenance advice. Please see specific information pulled into the AVS if appropriate.              Shelley Torres, TED  01/23/25

## 2025-01-31 DIAGNOSIS — F41.8 DEPRESSION WITH ANXIETY: ICD-10-CM

## 2025-01-31 DIAGNOSIS — K21.9 GASTROESOPHAGEAL REFLUX DISEASE, UNSPECIFIED WHETHER ESOPHAGITIS PRESENT: ICD-10-CM

## 2025-01-31 RX ORDER — OMEPRAZOLE 40 MG/1
40 CAPSULE, DELAYED RELEASE ORAL DAILY
Qty: 90 CAPSULE | Refills: 3 | Status: SHIPPED | OUTPATIENT
Start: 2025-01-31

## 2025-01-31 RX ORDER — DESVENLAFAXINE 50 MG/1
50 TABLET, FILM COATED, EXTENDED RELEASE ORAL DAILY
Qty: 90 TABLET | Refills: 1 | Status: SHIPPED | OUTPATIENT
Start: 2025-01-31

## 2025-01-31 NOTE — TELEPHONE ENCOUNTER
Rx Refill Note  Requested Prescriptions     Pending Prescriptions Disp Refills    omeprazole (priLOSEC) 40 MG capsule [Pharmacy Med Name: OMEPRAZOLE DR 40 MG CAPSULE] 90 capsule 3     Sig: TAKE 1 CAPSULE BY MOUTH DAILY    desvenlafaxine (PRISTIQ) 50 MG 24 hr tablet [Pharmacy Med Name: DESVENLAFAXINE SUCCNT ER 50 MG] 90 tablet 1     Sig: TAKE 1 TABLET BY MOUTH DAILY      Last office visit with prescribing clinician: 10/2/2024   Last telemedicine visit with prescribing clinician: Visit date not found   Next office visit with prescribing clinician: 4/9/2025                         Would you like a call back once the refill request has been completed: [] Yes [] No    If the office needs to give you a call back, can they leave a voicemail: [] Yes [] No    Alannah Braswell MA  01/31/25, 13:22 EST

## 2025-04-09 ENCOUNTER — OFFICE VISIT (OUTPATIENT)
Dept: INTERNAL MEDICINE | Facility: CLINIC | Age: 50
End: 2025-04-09
Payer: COMMERCIAL

## 2025-04-09 VITALS
OXYGEN SATURATION: 97 % | DIASTOLIC BLOOD PRESSURE: 76 MMHG | SYSTOLIC BLOOD PRESSURE: 122 MMHG | BODY MASS INDEX: 29.36 KG/M2 | HEIGHT: 65 IN | HEART RATE: 78 BPM | TEMPERATURE: 97.5 F | WEIGHT: 176.2 LBS

## 2025-04-09 DIAGNOSIS — Z00.00 ANNUAL PHYSICAL EXAM: Primary | ICD-10-CM

## 2025-04-09 DIAGNOSIS — E78.5 HYPERLIPIDEMIA LDL GOAL <100: Chronic | ICD-10-CM

## 2025-04-09 DIAGNOSIS — R73.01 IFG (IMPAIRED FASTING GLUCOSE): ICD-10-CM

## 2025-04-09 DIAGNOSIS — Z23 NEED FOR VACCINATION: ICD-10-CM

## 2025-04-09 DIAGNOSIS — E66.9 OBESITY (BMI 30-39.9): ICD-10-CM

## 2025-04-09 DIAGNOSIS — F41.8 DEPRESSION WITH ANXIETY: Chronic | ICD-10-CM

## 2025-04-09 DIAGNOSIS — Z13.6 SCREENING FOR CARDIOVASCULAR CONDITION: ICD-10-CM

## 2025-04-09 NOTE — PROGRESS NOTES
Annual Exam        Elia [facility] is being seen for a Complete physical exam. Her last physical was 4-.     Social: She is . She is working full time at [employer]. She has a son Gene    Lifestyle: She does not use tobacco. She drinks 1 alcoholic drinks per week. She exercises 0 times a week.    Screening: Colonoscopy was completed 2025, 10yr recall. Last labs reviewed from 2025.      Reproductive Health: Her Last Pap smear was 10-. . Postmenopausal with hx endometrial ablation.  Last Mammogram was 10-.    Dental exam is up to date. Eye exam was completed last year.     History of Present Illness     She needs f/u on Hyperlipidemia, Hypothyroidism, GERD, IFG, and depression with anxiety.      She has IFG, and strong FH of DM 2. She was prescribed Zepbound, but she is getting this compounded due to insurance coverage. She is on Zepbound weekly thru [place].      She is on Pristiq 50mg for depression and anxiety. This controls her symptoms, and she has not had any panic attacks. She thinks this is contributing to her weight gain, but does not wish to change this due to efficiecy. She uses xanax sparingly with flying.      She has hyperlipidemia. She is walking 5 times a week She admits to poor diet. She declines statin therapy, and will need new lipid levels.     She is on Omeprazole 40mg daily for GERD. She denies break thru symptoms unless she misses medication.     She was referred to [facility] for Hashimoto's hypothyroidism, and started on levothyroxine 25mcgs daily for hashimoto's and tapered up to 50mcgs daily. She tried and failed both tirosint and [place] thyroid. Office Visit with Shelley Torres APRN (2024)         The following portions of the patient's history were reviewed and updated as appropriate: allergies, current medications, past family history, past medical history, past social history, past surgical history, and problem list.    Review of  Systems   Constitutional: Negative.    HENT: Negative.     Eyes: Negative.    Respiratory: Negative.     Cardiovascular: Negative.  Negative for chest pain and leg swelling.   Gastrointestinal: Negative.    Endocrine: Negative.    Genitourinary: Negative.    Musculoskeletal: Negative.    Allergic/Immunologic: Negative.    Neurological: Negative.    Hematological: Negative.    Psychiatric/Behavioral:  The patient is nervous/anxious.    All other systems reviewed and are negative.      Objective       General Appearance:    Alert, cooperative, no distress, appears stated age   Head:    Normocephalic, without obvious abnormality, atraumatic   Eyes:    PERRL, conjunctiva/corneas clear, EOM's intact, fundi     benign, both eyes   Ears:    Normal TM's and external ear canals, both ears   Nose:   Nares normal, septum midline, mucosa normal, no drainage     or sinus tenderness   Throat:   Lips, mucosa, and tongue normal; teeth and gums normal   Neck:   Supple, symmetrical, trachea midline, no adenopathy;     thyroid:  no enlargement/tenderness/nodules; no carotid    bruit or JVD   Back:     Symmetric, no curvature, ROM normal, no CVA tenderness   Lungs:     Clear to auscultation bilaterally, respirations unlabored   Chest Wall:    No tenderness or deformity    Heart:    Regular rate and rhythm, S1 and S2 normal, no murmur, rub    or gallop   Breast Exam:    deferred   Abdomen:     Soft, non-tender, bowel sounds active all four quadrants,     no masses, no organomegaly   Genitalia:    deferred   Rectal:    deferred   Extremities:   Extremities normal, atraumatic, no cyanosis or edema   Pulses:   2+ and symmetric all extremities   Skin:   Skin color, texture, turgor normal, no rashes or lesions   Lymph nodes:   Cervical, supraclavicular, and axillary nodes normal   Neurologic:   CNII-XII intact, normal strength, sensation and reflexes     throughout               Assessment & Plan   Diagnoses and all orders for this  visit:    1. Annual physical exam (Primary)    2. Hyperlipidemia LDL goal <100  Comments:  Discussed zetia 10mg and CT calcium score    3. IFG (impaired fasting glucose)  Comments:  Resolvedwith weight loss  Orders:  -     Tirzepatide-Weight Management (ZEPBOUND) 2.5 MG/0.5ML solution auto-injector; Inject 0.5 mL under the skin into the appropriate area as directed 1 (One) Time Per Week. Getting this compounded    4. Need for vaccination  -     Pneumococcal Conjugate Vaccine 20-Valent All  -     Tdap Vaccine Greater Than or Equal To 8yo IM    5. Screening for cardiovascular condition  -     ECG 12 Lead    6. Obesity (BMI 30-39.9)    7. Depression with anxiety  Comments:  Well controlled on pristiq 50mg daily        Preventive counseling: Vaccines reviewed and updated. SBE monthly. Increase fruits and vegetables. Immunizations updated. CT calcium score recommended.     The patient has read and signed the [facility] Controlled Substance Contract.  I will continue to see patient for regular follow up appointments.  They are well controlled on their medication.  RIKKI is updated every 3 months. The patient is aware of the potential for addiction and dependence.     She will follow up at next scheduled appointment in 6 months.

## 2025-04-09 NOTE — PROGRESS NOTES
Procedure     ECG 12 Lead    Date/Time: 4/9/2025 8:27 AM  Performed by: Saadia Hunt APRN    Authorized by: Saadia Hunt APRN  Comparison: compared with previous ECG from 5/14/2017  Similar to previous ECG  Rhythm: sinus rhythm  Ectopy comments: none  Rate: normal  BPM: 72  Conduction: conduction normal  Conduction comments: GA 0.20 QRS 0.10  ST Segments: ST segments normal  T Waves: T waves normal  QRS axis: normal    Clinical impression: normal ECG

## 2025-04-14 DIAGNOSIS — E06.3 HASHIMOTO'S THYROIDITIS: ICD-10-CM

## 2025-04-14 RX ORDER — LEVOTHYROXINE SODIUM 50 UG/1
50 TABLET ORAL
Qty: 90 TABLET | Refills: 1 | Status: SHIPPED | OUTPATIENT
Start: 2025-04-14

## 2025-04-14 NOTE — TELEPHONE ENCOUNTER
Rx Refill Note  Requested Prescriptions     Pending Prescriptions Disp Refills    levothyroxine (SYNTHROID, LEVOTHROID) 50 MCG tablet [Pharmacy Med Name: LEVOTHYROXINE 50 MCG TABLET] 90 tablet 1     Sig: TAKE 1 TABLET BY MOUTH EVERY MORNING      Last office visit with prescribing clinician: 1/23/2025   Last telemedicine visit with prescribing clinician: Visit date not found   Next office visit with prescribing clinician: 7/23/2025                         Would you like a call back once the refill request has been completed: [] Yes [] No    If the office needs to give you a call back, can they leave a voicemail: [] Yes [] No    Shanice Rossi  04/14/25, 10:33 EDT

## 2025-07-23 ENCOUNTER — OFFICE VISIT (OUTPATIENT)
Dept: ENDOCRINOLOGY | Age: 50
End: 2025-07-23
Payer: COMMERCIAL

## 2025-07-23 VITALS
SYSTOLIC BLOOD PRESSURE: 108 MMHG | HEART RATE: 72 BPM | DIASTOLIC BLOOD PRESSURE: 76 MMHG | TEMPERATURE: 97.9 F | OXYGEN SATURATION: 98 % | HEIGHT: 65 IN | WEIGHT: 167 LBS | BODY MASS INDEX: 27.82 KG/M2

## 2025-07-23 DIAGNOSIS — E06.3 HASHIMOTO'S THYROIDITIS: ICD-10-CM

## 2025-07-23 DIAGNOSIS — E06.3 HYPOTHYROIDISM DUE TO HASHIMOTO THYROIDITIS: Primary | ICD-10-CM

## 2025-07-23 PROCEDURE — 99214 OFFICE O/P EST MOD 30 MIN: CPT | Performed by: NURSE PRACTITIONER

## 2025-07-23 RX ORDER — LEVOTHYROXINE SODIUM 50 UG/1
50 TABLET ORAL
Qty: 90 TABLET | Refills: 1 | Status: SHIPPED | OUTPATIENT
Start: 2025-07-23

## 2025-07-23 NOTE — PROGRESS NOTES
"Chief Complaint  Chief Complaint   Patient presents with    Hypothyroidism       Subjective          History of Present Illness    [facility] 49 [facility] presents for a follow-up evaluation of Hypothyroidism due to Hashimoto's     Thyroglobulin antibody positive in 2018        She complains of fatigue and cold intolerance    Denies hair loss, dry skin, diarrhea, constipation, shortness of breath,chest pain, palpitations and heat intolerance.      Weight loss of 17 lbs since last visit - on Zepbound     Periods are absent due to ablation in 2022       Current treatment is levothyroxine 50 mcg daily  Past treatment includes [place] and Tirosint    Last labs in 04/25 showed TSH 2.340 and FT4 1.13        Objective   Vital Signs:   /76   Pulse 72   Temp 97.9 °F (36.6 °C) (Oral)   Ht 165.1 cm (65\")   Wt 75.8 kg (167 lb)   SpO2 98%   BMI 27.79 kg/m²       Physical Exam   Physical Exam  Constitutional:       General: She is not in acute distress.     Appearance: Normal appearance. She is not diaphoretic.   HENT:      Head: Normocephalic and atraumatic.   Eyes:      General:         Right eye: No discharge.         Left eye: No discharge.   Skin:     General: Skin is warm and dry.   Neurological:      Mental Status: She is alert.   Psychiatric:         Mood and Affect: Mood normal.         Behavior: Behavior normal.                    Results Review:   TSH   Date Value Ref Range Status   04/04/2025 2.340 0.270 - 4.200 uIU/mL Final     T3, Free   Date Value Ref Range Status   12/13/2018 3.0 2.0 - 4.4 pg/mL Final         Assessment and Plan    Diagnoses and all orders for this visit:    1. Hypothyroidism due to Hashimoto thyroiditis (Primary)  -     levothyroxine (SYNTHROID, LEVOTHROID) 50 MCG tablet; Take 1 tablet by mouth Every Morning.  Dispense: 90 tablet; Refill: 1  -     TSH Rfx On Abnormal To Free T4; Future    Labs in April of this year were good  Pt states that over all she feels good and no changes " since labs were last checked  Continue with current levo dose      2. Hashimoto's thyroiditis  -     levothyroxine (SYNTHROID, LEVOTHROID) 50 MCG tablet; Take 1 tablet by mouth Every Morning.  Dispense: 90 tablet; Refill: 1  -     TSH Rfx On Abnormal To Free T4; Future    Positive thyroglobulin antibody in 2018  Labs in April of this year were good  Pt states that over all she feels good and no changes since labs were last checked  Continue with current levo dose        Refills sent to pharmacy        RTC in 6 months with me, Labs prior      Follow Up     Patient was given instructions and counseling regarding her condition or for health maintenance advice. Please see specific information pulled into the AVS if appropriate.              TED Yin  07/23/25

## 2025-08-19 DIAGNOSIS — F41.8 DEPRESSION WITH ANXIETY: ICD-10-CM

## 2025-08-20 RX ORDER — DESVENLAFAXINE 50 MG/1
50 TABLET, FILM COATED, EXTENDED RELEASE ORAL DAILY
Qty: 90 TABLET | Refills: 1 | Status: SHIPPED | OUTPATIENT
Start: 2025-08-20

## (undated) DEVICE — THREADED GUIDE WIRE

## (undated) DEVICE — TP SXN YANKR BULB STRL

## (undated) DEVICE — 3M™ STERI-STRIP™ REINFORCED ADHESIVE SKIN CLOSURES, R1548, 1 IN X 5 IN (25 MM X 125 MM), 4 STRIPS/ENVELOPE: Brand: 3M™ STERI-STRIP™

## (undated) DEVICE — PREP SOL POVIDONE/IODINE BT 4OZ

## (undated) DEVICE — GLV SURG NEOLON 2G PF LF 8 STRL

## (undated) DEVICE — TOWEL,OR,DSP,ST,BLUE,STD,4/PK,20PK/CS: Brand: MEDLINE

## (undated) DEVICE — DRP Z/FRICTION 10X16IN

## (undated) DEVICE — NDL SPINE 22G 31/2IN BLK

## (undated) DEVICE — BNDG ELAS MATRX V/CLS 4IN 5YD LF

## (undated) DEVICE — SYR LL W/SCALE/MARK 3ML STRL

## (undated) DEVICE — SYS SKIN CLS DERMABOND PRINEO W/60CM MESH TP

## (undated) DEVICE — BONE SCREW, T7
Type: IMPLANTABLE DEVICE | Site: ANKLE | Status: NON-FUNCTIONAL
Brand: VARIAX
Removed: 2017-05-13

## (undated) DEVICE — BOWL UTIL STRL 32OZ

## (undated) DEVICE — CANNULATED SCREW
Type: IMPLANTABLE DEVICE | Site: ANKLE | Status: NON-FUNCTIONAL
Brand: ASNIS
Removed: 2017-05-13

## (undated) DEVICE — Device

## (undated) DEVICE — ADAPT CLN BIOGUARD AIR/H2O DISP

## (undated) DEVICE — SPLNT 1 STEP 4INX30IN

## (undated) DEVICE — APPL CHLORAPREP W/TINT 26ML ORNG

## (undated) DEVICE — GAUZE,SPONGE,FLUFF,6"X6.75",STRL,5/TRAY: Brand: MEDLINE

## (undated) DEVICE — BW-412T DISP COMBO CLEANING BRUSH: Brand: SINGLE USE COMBINATION CLEANING BRUSH

## (undated) DEVICE — PK BASIC ORTHO 90

## (undated) DEVICE — GLV SURG SENSICARE MICRO PF LF 7.5 STRL

## (undated) DEVICE — NDL HYPO SFTY GLD 22G 1 1/2IN

## (undated) DEVICE — GLV SURG SENSICARE ORTHO PF LF 8 STRL

## (undated) DEVICE — 3M™ STERI-STRIP™ COMPOUND BENZOIN TINCTURE 40 BAGS/CARTON 4 CARTONS/CASE C1544: Brand: 3M™ STERI-STRIP™

## (undated) DEVICE — IRRIGATOR BULB 60CC

## (undated) DEVICE — LINER SURG CANSTR SXN S/RIGD 1500CC

## (undated) DEVICE — SYR CONTRL LUERLOK 10CC

## (undated) DEVICE — SYS SKIN CLS DERMABOND PRINEO W/22CM MESH TP

## (undated) DEVICE — SOL IRR H2O BO 1000ML STRL

## (undated) DEVICE — FRAZIER SUCTION INSTRUMENT 10 FR W/CONTROL VENT & OBTURATOR: Brand: FRAZIER

## (undated) DEVICE — DISPOSABLE TOURNIQUET CUFF SINGLE BLADDER, SINGLE PORT AND LUER LOCK CONNECTOR: Brand: COLOR CUFF

## (undated) DEVICE — DRILL, WL 50MM, AO-SHAFT: Brand: VARIAX

## (undated) DEVICE — 3M™ IOBAN™ 2 ANTIMICROBIAL INCISE DRAPE 6651EZ: Brand: IOBAN™ 2

## (undated) DEVICE — TRY SKINPREP WET CHG 4PCT SPNG HIB

## (undated) DEVICE — Device: Brand: LIGHT GUARD™ FLEXIBLE LIGHT HANDLE COVER (1 EACH/PKG)

## (undated) DEVICE — LOCKING SCREW, T7
Type: IMPLANTABLE DEVICE | Site: ANKLE | Status: NON-FUNCTIONAL
Brand: VARIAX
Removed: 2017-05-13

## (undated) DEVICE — BANDAGE,GAUZE,BULKEE II,4.5"X4.1YD,STRL: Brand: MEDLINE

## (undated) DEVICE — UNDYED BRAIDED (POLYGLACTIN 910), SYNTHETIC ABSORBABLE SUTURE: Brand: COATED VICRYL

## (undated) DEVICE — GLV SURG EUDERMIC PF LTX 8 STRL

## (undated) DEVICE — DRILL, WL 70MM, AO-SHAFT: Brand: VARIAX

## (undated) DEVICE — DRSNG PAD ABD 8X10IN STRL

## (undated) DEVICE — CANNULATED DRILL

## (undated) DEVICE — KT ORCA ORCAPOD DISP STRL

## (undated) DEVICE — DRP C/ARM 41X74IN

## (undated) DEVICE — BNDG ESMARK 6INX9FT STRL